# Patient Record
Sex: MALE | Race: WHITE | Employment: OTHER | URBAN - METROPOLITAN AREA
[De-identification: names, ages, dates, MRNs, and addresses within clinical notes are randomized per-mention and may not be internally consistent; named-entity substitution may affect disease eponyms.]

---

## 2022-03-04 ENCOUNTER — APPOINTMENT (OUTPATIENT)
Dept: CT IMAGING | Age: 52
End: 2022-03-04
Payer: MEDICARE

## 2022-03-04 ENCOUNTER — APPOINTMENT (OUTPATIENT)
Dept: GENERAL RADIOLOGY | Age: 52
End: 2022-03-04
Payer: MEDICARE

## 2022-03-04 ENCOUNTER — HOSPITAL ENCOUNTER (OUTPATIENT)
Age: 52
Setting detail: OBSERVATION
Discharge: OTHER INSTITUTION | End: 2022-03-07
Attending: EMERGENCY MEDICINE | Admitting: INTERNAL MEDICINE
Payer: MEDICARE

## 2022-03-04 DIAGNOSIS — M54.12 CERVICAL RADICULOPATHY: ICD-10-CM

## 2022-03-04 DIAGNOSIS — R07.9 CHEST PAIN, UNSPECIFIED TYPE: Primary | ICD-10-CM

## 2022-03-04 DIAGNOSIS — G90.512 COMPLEX REGIONAL PAIN SYNDROME TYPE 1 OF LEFT UPPER EXTREMITY: ICD-10-CM

## 2022-03-04 DIAGNOSIS — M19.042 DEGENERATIVE ARTHRITIS OF METACARPOPHALANGEAL JOINT OF LEFT THUMB: ICD-10-CM

## 2022-03-04 PROBLEM — R07.89 ATYPICAL CHEST PAIN: Status: ACTIVE | Noted: 2022-03-04

## 2022-03-04 LAB
ALBUMIN SERPL-MCNC: 4.6 G/DL (ref 3.5–5.2)
ALP BLD-CCNC: 107 U/L (ref 40–129)
ALT SERPL-CCNC: 38 U/L (ref 0–40)
ANION GAP SERPL CALCULATED.3IONS-SCNC: 12 MMOL/L (ref 7–16)
AST SERPL-CCNC: 21 U/L (ref 0–39)
BACTERIA: NORMAL /HPF
BASOPHILS ABSOLUTE: 0.08 E9/L (ref 0–0.2)
BASOPHILS RELATIVE PERCENT: 1.2 % (ref 0–2)
BILIRUB SERPL-MCNC: 0.4 MG/DL (ref 0–1.2)
BILIRUBIN URINE: NEGATIVE
BLOOD, URINE: NEGATIVE
BUN BLDV-MCNC: 17 MG/DL (ref 6–20)
CALCIUM SERPL-MCNC: 9.5 MG/DL (ref 8.6–10.2)
CHLORIDE BLD-SCNC: 101 MMOL/L (ref 98–107)
CLARITY: CLEAR
CO2: 23 MMOL/L (ref 22–29)
COLOR: YELLOW
CREAT SERPL-MCNC: 0.8 MG/DL (ref 0.7–1.2)
EOSINOPHILS ABSOLUTE: 0.4 E9/L (ref 0.05–0.5)
EOSINOPHILS RELATIVE PERCENT: 5.8 % (ref 0–6)
GFR AFRICAN AMERICAN: >60
GFR NON-AFRICAN AMERICAN: >60 ML/MIN/1.73
GLUCOSE BLD-MCNC: 158 MG/DL (ref 74–99)
GLUCOSE URINE: NEGATIVE MG/DL
HCT VFR BLD CALC: 47.1 % (ref 37–54)
HEMOGLOBIN: 16.5 G/DL (ref 12.5–16.5)
IMMATURE GRANULOCYTES #: 0.01 E9/L
IMMATURE GRANULOCYTES %: 0.1 % (ref 0–5)
KETONES, URINE: NEGATIVE MG/DL
LACTIC ACID: 1.4 MMOL/L (ref 0.5–2.2)
LEUKOCYTE ESTERASE, URINE: NEGATIVE
LYMPHOCYTES ABSOLUTE: 2.7 E9/L (ref 1.5–4)
LYMPHOCYTES RELATIVE PERCENT: 39.4 % (ref 20–42)
MCH RBC QN AUTO: 31.5 PG (ref 26–35)
MCHC RBC AUTO-ENTMCNC: 35 % (ref 32–34.5)
MCV RBC AUTO: 89.9 FL (ref 80–99.9)
MONOCYTES ABSOLUTE: 0.74 E9/L (ref 0.1–0.95)
MONOCYTES RELATIVE PERCENT: 10.8 % (ref 2–12)
NEUTROPHILS ABSOLUTE: 2.92 E9/L (ref 1.8–7.3)
NEUTROPHILS RELATIVE PERCENT: 42.7 % (ref 43–80)
NITRITE, URINE: NEGATIVE
PDW BLD-RTO: 12.4 FL (ref 11.5–15)
PH UA: 7 (ref 5–9)
PLATELET # BLD: 241 E9/L (ref 130–450)
PMV BLD AUTO: 10 FL (ref 7–12)
POTASSIUM REFLEX MAGNESIUM: 4.1 MMOL/L (ref 3.5–5)
PRO-BNP: 8 PG/ML (ref 0–125)
PROTEIN UA: NEGATIVE MG/DL
RBC # BLD: 5.24 E12/L (ref 3.8–5.8)
RBC UA: NORMAL /HPF (ref 0–2)
SODIUM BLD-SCNC: 136 MMOL/L (ref 132–146)
SPECIFIC GRAVITY UA: 1.01 (ref 1–1.03)
TOTAL PROTEIN: 7.8 G/DL (ref 6.4–8.3)
TROPONIN, HIGH SENSITIVITY: 6 NG/L (ref 0–11)
TROPONIN, HIGH SENSITIVITY: 6 NG/L (ref 0–11)
UROBILINOGEN, URINE: 0.2 E.U./DL
WBC # BLD: 6.9 E9/L (ref 4.5–11.5)
WBC UA: NORMAL /HPF (ref 0–5)

## 2022-03-04 PROCEDURE — 6370000000 HC RX 637 (ALT 250 FOR IP): Performed by: STUDENT IN AN ORGANIZED HEALTH CARE EDUCATION/TRAINING PROGRAM

## 2022-03-04 PROCEDURE — 99285 EMERGENCY DEPT VISIT HI MDM: CPT

## 2022-03-04 PROCEDURE — 6370000000 HC RX 637 (ALT 250 FOR IP): Performed by: INTERNAL MEDICINE

## 2022-03-04 PROCEDURE — 80053 COMPREHEN METABOLIC PANEL: CPT

## 2022-03-04 PROCEDURE — 81001 URINALYSIS AUTO W/SCOPE: CPT

## 2022-03-04 PROCEDURE — 36415 COLL VENOUS BLD VENIPUNCTURE: CPT

## 2022-03-04 PROCEDURE — 71045 X-RAY EXAM CHEST 1 VIEW: CPT

## 2022-03-04 PROCEDURE — 83880 ASSAY OF NATRIURETIC PEPTIDE: CPT

## 2022-03-04 PROCEDURE — 70496 CT ANGIOGRAPHY HEAD: CPT

## 2022-03-04 PROCEDURE — 80307 DRUG TEST PRSMV CHEM ANLYZR: CPT

## 2022-03-04 PROCEDURE — 96361 HYDRATE IV INFUSION ADD-ON: CPT

## 2022-03-04 PROCEDURE — 70498 CT ANGIOGRAPHY NECK: CPT

## 2022-03-04 PROCEDURE — 99220 PR INITIAL OBSERVATION CARE/DAY 70 MINUTES: CPT | Performed by: INTERNAL MEDICINE

## 2022-03-04 PROCEDURE — 6360000004 HC RX CONTRAST MEDICATION: Performed by: RADIOLOGY

## 2022-03-04 PROCEDURE — 2580000003 HC RX 258: Performed by: STUDENT IN AN ORGANIZED HEALTH CARE EDUCATION/TRAINING PROGRAM

## 2022-03-04 PROCEDURE — 83605 ASSAY OF LACTIC ACID: CPT

## 2022-03-04 PROCEDURE — 84484 ASSAY OF TROPONIN QUANT: CPT

## 2022-03-04 PROCEDURE — 85025 COMPLETE CBC W/AUTO DIFF WBC: CPT

## 2022-03-04 PROCEDURE — 93005 ELECTROCARDIOGRAM TRACING: CPT | Performed by: STUDENT IN AN ORGANIZED HEALTH CARE EDUCATION/TRAINING PROGRAM

## 2022-03-04 RX ORDER — 0.9 % SODIUM CHLORIDE 0.9 %
1000 INTRAVENOUS SOLUTION INTRAVENOUS ONCE
Status: COMPLETED | OUTPATIENT
Start: 2022-03-04 | End: 2022-03-04

## 2022-03-04 RX ORDER — ACETAMINOPHEN 325 MG/1
650 TABLET ORAL EVERY 6 HOURS PRN
Status: DISCONTINUED | OUTPATIENT
Start: 2022-03-04 | End: 2022-03-07 | Stop reason: HOSPADM

## 2022-03-04 RX ORDER — ASPIRIN 81 MG/1
324 TABLET, CHEWABLE ORAL ONCE
Status: COMPLETED | OUTPATIENT
Start: 2022-03-04 | End: 2022-03-04

## 2022-03-04 RX ORDER — ONDANSETRON 4 MG/1
4 TABLET, ORALLY DISINTEGRATING ORAL EVERY 8 HOURS PRN
Status: DISCONTINUED | OUTPATIENT
Start: 2022-03-04 | End: 2022-03-07 | Stop reason: HOSPADM

## 2022-03-04 RX ORDER — ACETAMINOPHEN 325 MG/1
650 TABLET ORAL ONCE
Status: COMPLETED | OUTPATIENT
Start: 2022-03-04 | End: 2022-03-04

## 2022-03-04 RX ORDER — ASPIRIN 81 MG/1
81 TABLET, CHEWABLE ORAL DAILY
Status: DISCONTINUED | OUTPATIENT
Start: 2022-03-05 | End: 2022-03-07 | Stop reason: HOSPADM

## 2022-03-04 RX ORDER — ONDANSETRON 2 MG/ML
4 INJECTION INTRAMUSCULAR; INTRAVENOUS EVERY 6 HOURS PRN
Status: DISCONTINUED | OUTPATIENT
Start: 2022-03-04 | End: 2022-03-07 | Stop reason: HOSPADM

## 2022-03-04 RX ORDER — ATORVASTATIN CALCIUM 40 MG/1
40 TABLET, FILM COATED ORAL NIGHTLY
Status: DISCONTINUED | OUTPATIENT
Start: 2022-03-05 | End: 2022-03-07 | Stop reason: HOSPADM

## 2022-03-04 RX ORDER — AMLODIPINE BESYLATE 10 MG/1
10 TABLET ORAL DAILY
Status: ON HOLD | COMMUNITY
End: 2022-03-07 | Stop reason: SDUPTHER

## 2022-03-04 RX ORDER — ALBUTEROL SULFATE 90 UG/1
2 AEROSOL, METERED RESPIRATORY (INHALATION) EVERY 4 HOURS PRN
COMMUNITY

## 2022-03-04 RX ORDER — SODIUM CHLORIDE 0.9 % (FLUSH) 0.9 %
5-40 SYRINGE (ML) INJECTION EVERY 12 HOURS SCHEDULED
Status: DISCONTINUED | OUTPATIENT
Start: 2022-03-05 | End: 2022-03-07 | Stop reason: HOSPADM

## 2022-03-04 RX ORDER — SODIUM CHLORIDE 0.9 % (FLUSH) 0.9 %
5-40 SYRINGE (ML) INJECTION PRN
Status: DISCONTINUED | OUTPATIENT
Start: 2022-03-04 | End: 2022-03-07 | Stop reason: HOSPADM

## 2022-03-04 RX ORDER — POLYETHYLENE GLYCOL 3350 17 G/17G
17 POWDER, FOR SOLUTION ORAL DAILY PRN
Status: DISCONTINUED | OUTPATIENT
Start: 2022-03-04 | End: 2022-03-07 | Stop reason: HOSPADM

## 2022-03-04 RX ORDER — SODIUM CHLORIDE 9 MG/ML
25 INJECTION, SOLUTION INTRAVENOUS PRN
Status: DISCONTINUED | OUTPATIENT
Start: 2022-03-04 | End: 2022-03-07 | Stop reason: HOSPADM

## 2022-03-04 RX ORDER — ACETAMINOPHEN 650 MG/1
650 SUPPOSITORY RECTAL EVERY 6 HOURS PRN
Status: DISCONTINUED | OUTPATIENT
Start: 2022-03-04 | End: 2022-03-07 | Stop reason: HOSPADM

## 2022-03-04 RX ORDER — NAPROXEN 500 MG/1
500 TABLET ORAL
Status: ON HOLD | COMMUNITY
End: 2022-03-07 | Stop reason: HOSPADM

## 2022-03-04 RX ORDER — AMLODIPINE BESYLATE 5 MG/1
10 TABLET ORAL DAILY
Status: DISCONTINUED | OUTPATIENT
Start: 2022-03-05 | End: 2022-03-07 | Stop reason: HOSPADM

## 2022-03-04 RX ADMIN — SODIUM CHLORIDE 1000 ML: 9 INJECTION, SOLUTION INTRAVENOUS at 19:34

## 2022-03-04 RX ADMIN — IOPAMIDOL 75 ML: 755 INJECTION, SOLUTION INTRAVENOUS at 20:26

## 2022-03-04 RX ADMIN — ASPIRIN 81 MG CHEWABLE TABLET 324 MG: 81 TABLET CHEWABLE at 21:49

## 2022-03-04 RX ADMIN — ACETAMINOPHEN 650 MG: 325 TABLET ORAL at 19:34

## 2022-03-04 RX ADMIN — ATORVASTATIN CALCIUM 40 MG: 40 TABLET, FILM COATED ORAL at 23:59

## 2022-03-04 ASSESSMENT — PAIN SCALES - GENERAL
PAINLEVEL_OUTOF10: 6
PAINLEVEL_OUTOF10: 8

## 2022-03-04 ASSESSMENT — PAIN DESCRIPTION - ORIENTATION: ORIENTATION: POSTERIOR

## 2022-03-04 ASSESSMENT — PAIN DESCRIPTION - DIRECTION: RADIATING_TOWARDS: BEHIND EYES

## 2022-03-04 ASSESSMENT — PAIN DESCRIPTION - PAIN TYPE: TYPE: ACUTE PAIN

## 2022-03-04 ASSESSMENT — PAIN DESCRIPTION - LOCATION: LOCATION: HEAD;NECK

## 2022-03-04 ASSESSMENT — PAIN - FUNCTIONAL ASSESSMENT: PAIN_FUNCTIONAL_ASSESSMENT: 0-10

## 2022-03-05 ENCOUNTER — APPOINTMENT (OUTPATIENT)
Dept: NUCLEAR MEDICINE | Age: 52
End: 2022-03-05
Payer: MEDICARE

## 2022-03-05 PROBLEM — R20.0 LEFT SIDED NUMBNESS: Status: ACTIVE | Noted: 2022-03-05

## 2022-03-05 PROBLEM — I63.9 STROKE (CEREBRUM) (HCC): Status: ACTIVE | Noted: 2022-03-05

## 2022-03-05 PROBLEM — G90.512 COMPLEX REGIONAL PAIN SYNDROME TYPE 1 OF LEFT UPPER EXTREMITY: Status: ACTIVE | Noted: 2022-03-05

## 2022-03-05 PROBLEM — M54.12 CERVICAL RADICULOPATHY: Status: ACTIVE | Noted: 2022-03-05

## 2022-03-05 PROBLEM — F17.200 TOBACCO USE DISORDER: Status: ACTIVE | Noted: 2022-01-11

## 2022-03-05 PROBLEM — F31.9 BIPOLAR DISORDER (HCC): Status: ACTIVE | Noted: 2022-03-05

## 2022-03-05 PROBLEM — F43.10 PTSD (POST-TRAUMATIC STRESS DISORDER): Status: ACTIVE | Noted: 2022-03-05

## 2022-03-05 PROBLEM — M19.042 DEGENERATIVE ARTHRITIS OF METACARPOPHALANGEAL JOINT OF LEFT THUMB: Status: ACTIVE | Noted: 2022-03-05

## 2022-03-05 PROBLEM — M25.512 ACUTE PAIN OF LEFT SHOULDER: Status: ACTIVE | Noted: 2022-03-05

## 2022-03-05 LAB
AMPHETAMINE SCREEN, URINE: NOT DETECTED
ANION GAP SERPL CALCULATED.3IONS-SCNC: 10 MMOL/L (ref 7–16)
BARBITURATE SCREEN URINE: NOT DETECTED
BENZODIAZEPINE SCREEN, URINE: NOT DETECTED
BUN BLDV-MCNC: 15 MG/DL (ref 6–20)
C-REACTIVE PROTEIN: 0.3 MG/DL (ref 0–0.4)
CALCIUM SERPL-MCNC: 9.1 MG/DL (ref 8.6–10.2)
CANNABINOID SCREEN URINE: NOT DETECTED
CHLORIDE BLD-SCNC: 105 MMOL/L (ref 98–107)
CHOLESTEROL, TOTAL: 170 MG/DL (ref 0–199)
CO2: 23 MMOL/L (ref 22–29)
COCAINE METABOLITE SCREEN URINE: NOT DETECTED
CREAT SERPL-MCNC: 0.8 MG/DL (ref 0.7–1.2)
EKG ATRIAL RATE: 66 BPM
EKG ATRIAL RATE: 81 BPM
EKG P AXIS: 42 DEGREES
EKG P AXIS: 61 DEGREES
EKG P-R INTERVAL: 148 MS
EKG P-R INTERVAL: 156 MS
EKG Q-T INTERVAL: 378 MS
EKG Q-T INTERVAL: 410 MS
EKG QRS DURATION: 84 MS
EKG QRS DURATION: 88 MS
EKG QTC CALCULATION (BAZETT): 429 MS
EKG QTC CALCULATION (BAZETT): 439 MS
EKG R AXIS: 20 DEGREES
EKG R AXIS: 62 DEGREES
EKG T AXIS: 19 DEGREES
EKG T AXIS: 38 DEGREES
EKG VENTRICULAR RATE: 66 BPM
EKG VENTRICULAR RATE: 81 BPM
FENTANYL SCREEN, URINE: NOT DETECTED
GFR AFRICAN AMERICAN: >60
GFR NON-AFRICAN AMERICAN: >60 ML/MIN/1.73
GLUCOSE BLD-MCNC: 88 MG/DL (ref 74–99)
HCT VFR BLD CALC: 44.6 % (ref 37–54)
HDLC SERPL-MCNC: 32 MG/DL
HEMOGLOBIN: 15.6 G/DL (ref 12.5–16.5)
LDL CHOLESTEROL CALCULATED: 122 MG/DL (ref 0–99)
Lab: NORMAL
MCH RBC QN AUTO: 31.9 PG (ref 26–35)
MCHC RBC AUTO-ENTMCNC: 35 % (ref 32–34.5)
MCV RBC AUTO: 91.2 FL (ref 80–99.9)
METHADONE SCREEN, URINE: NOT DETECTED
OPIATE SCREEN URINE: NOT DETECTED
OXYCODONE URINE: NOT DETECTED
PDW BLD-RTO: 12.6 FL (ref 11.5–15)
PHENCYCLIDINE SCREEN URINE: NOT DETECTED
PLATELET # BLD: 225 E9/L (ref 130–450)
PMV BLD AUTO: 10.3 FL (ref 7–12)
POTASSIUM SERPL-SCNC: 4.1 MMOL/L (ref 3.5–5)
RBC # BLD: 4.89 E12/L (ref 3.8–5.8)
RHEUMATOID FACTOR: 11 IU/ML (ref 0–13)
SEDIMENTATION RATE, ERYTHROCYTE: 4 MM/HR (ref 0–15)
SODIUM BLD-SCNC: 138 MMOL/L (ref 132–146)
TRIGL SERPL-MCNC: 81 MG/DL (ref 0–149)
VLDLC SERPL CALC-MCNC: 16 MG/DL
WBC # BLD: 6.9 E9/L (ref 4.5–11.5)

## 2022-03-05 PROCEDURE — 85651 RBC SED RATE NONAUTOMATED: CPT

## 2022-03-05 PROCEDURE — 85027 COMPLETE CBC AUTOMATED: CPT

## 2022-03-05 PROCEDURE — 6370000000 HC RX 637 (ALT 250 FOR IP): Performed by: INTERNAL MEDICINE

## 2022-03-05 PROCEDURE — 86038 ANTINUCLEAR ANTIBODIES: CPT

## 2022-03-05 PROCEDURE — G0378 HOSPITAL OBSERVATION PER HR: HCPCS

## 2022-03-05 PROCEDURE — 86592 SYPHILIS TEST NON-TREP QUAL: CPT

## 2022-03-05 PROCEDURE — 6360000002 HC RX W HCPCS: Performed by: INTERNAL MEDICINE

## 2022-03-05 PROCEDURE — 99226 PR SBSQ OBSERVATION CARE/DAY 35 MINUTES: CPT | Performed by: FAMILY MEDICINE

## 2022-03-05 PROCEDURE — 87491 CHLMYD TRACH DNA AMP PROBE: CPT

## 2022-03-05 PROCEDURE — 86431 RHEUMATOID FACTOR QUANT: CPT

## 2022-03-05 PROCEDURE — 86140 C-REACTIVE PROTEIN: CPT

## 2022-03-05 PROCEDURE — 96372 THER/PROPH/DIAG INJ SC/IM: CPT

## 2022-03-05 PROCEDURE — 96375 TX/PRO/DX INJ NEW DRUG ADDON: CPT

## 2022-03-05 PROCEDURE — A9500 TC99M SESTAMIBI: HCPCS | Performed by: RADIOLOGY

## 2022-03-05 PROCEDURE — 36415 COLL VENOUS BLD VENIPUNCTURE: CPT

## 2022-03-05 PROCEDURE — 99204 OFFICE O/P NEW MOD 45 MIN: CPT | Performed by: INTERNAL MEDICINE

## 2022-03-05 PROCEDURE — 80061 LIPID PANEL: CPT

## 2022-03-05 PROCEDURE — 78452 HT MUSCLE IMAGE SPECT MULT: CPT

## 2022-03-05 PROCEDURE — 2580000003 HC RX 258: Performed by: INTERNAL MEDICINE

## 2022-03-05 PROCEDURE — 87591 N.GONORRHOEAE DNA AMP PROB: CPT

## 2022-03-05 PROCEDURE — 93005 ELECTROCARDIOGRAM TRACING: CPT | Performed by: INTERNAL MEDICINE

## 2022-03-05 PROCEDURE — 80048 BASIC METABOLIC PNL TOTAL CA: CPT

## 2022-03-05 PROCEDURE — 3430000000 HC RX DIAGNOSTIC RADIOPHARMACEUTICAL: Performed by: RADIOLOGY

## 2022-03-05 RX ORDER — KETOROLAC TROMETHAMINE 30 MG/ML
30 INJECTION, SOLUTION INTRAMUSCULAR; INTRAVENOUS EVERY 8 HOURS PRN
Status: DISCONTINUED | OUTPATIENT
Start: 2022-03-05 | End: 2022-03-07

## 2022-03-05 RX ORDER — OXYCODONE HYDROCHLORIDE AND ACETAMINOPHEN 5; 325 MG/1; MG/1
1 TABLET ORAL EVERY 4 HOURS PRN
Status: DISCONTINUED | OUTPATIENT
Start: 2022-03-05 | End: 2022-03-07 | Stop reason: HOSPADM

## 2022-03-05 RX ORDER — ISOSORBIDE MONONITRATE 30 MG/1
30 TABLET, EXTENDED RELEASE ORAL DAILY
Status: DISCONTINUED | OUTPATIENT
Start: 2022-03-05 | End: 2022-03-07 | Stop reason: HOSPADM

## 2022-03-05 RX ADMIN — Medication 20 MILLICURIE: at 13:23

## 2022-03-05 RX ADMIN — ONDANSETRON 4 MG: 4 TABLET, ORALLY DISINTEGRATING ORAL at 14:30

## 2022-03-05 RX ADMIN — SODIUM CHLORIDE, PRESERVATIVE FREE 10 ML: 5 INJECTION INTRAVENOUS at 09:00

## 2022-03-05 RX ADMIN — ASPIRIN 81 MG 81 MG: 81 TABLET ORAL at 10:09

## 2022-03-05 RX ADMIN — OXYCODONE AND ACETAMINOPHEN 1 TABLET: 5; 325 TABLET ORAL at 01:53

## 2022-03-05 RX ADMIN — SODIUM CHLORIDE, PRESERVATIVE FREE 10 ML: 5 INJECTION INTRAVENOUS at 20:37

## 2022-03-05 RX ADMIN — AMLODIPINE BESYLATE 10 MG: 5 TABLET ORAL at 10:09

## 2022-03-05 RX ADMIN — OXYCODONE AND ACETAMINOPHEN 1 TABLET: 5; 325 TABLET ORAL at 06:06

## 2022-03-05 RX ADMIN — ATORVASTATIN CALCIUM 40 MG: 40 TABLET, FILM COATED ORAL at 20:37

## 2022-03-05 RX ADMIN — OXYCODONE AND ACETAMINOPHEN 1 TABLET: 5; 325 TABLET ORAL at 10:10

## 2022-03-05 RX ADMIN — ONDANSETRON 4 MG: 2 INJECTION INTRAMUSCULAR; INTRAVENOUS at 03:01

## 2022-03-05 RX ADMIN — ISOSORBIDE MONONITRATE 30 MG: 30 TABLET, EXTENDED RELEASE ORAL at 14:01

## 2022-03-05 RX ADMIN — OXYCODONE AND ACETAMINOPHEN 1 TABLET: 5; 325 TABLET ORAL at 14:28

## 2022-03-05 RX ADMIN — ENOXAPARIN SODIUM 40 MG: 100 INJECTION SUBCUTANEOUS at 09:00

## 2022-03-05 RX ADMIN — OXYCODONE AND ACETAMINOPHEN 1 TABLET: 5; 325 TABLET ORAL at 20:37

## 2022-03-05 ASSESSMENT — ENCOUNTER SYMPTOMS
SHORTNESS OF BREATH: 0
DIARRHEA: 0
NAUSEA: 0
ABDOMINAL PAIN: 0
VOMITING: 0
COUGH: 0
BACK PAIN: 1

## 2022-03-05 ASSESSMENT — PAIN SCALES - GENERAL
PAINLEVEL_OUTOF10: 8
PAINLEVEL_OUTOF10: 3
PAINLEVEL_OUTOF10: 2
PAINLEVEL_OUTOF10: 7
PAINLEVEL_OUTOF10: 6
PAINLEVEL_OUTOF10: 8
PAINLEVEL_OUTOF10: 7
PAINLEVEL_OUTOF10: 8
PAINLEVEL_OUTOF10: 7
PAINLEVEL_OUTOF10: 8

## 2022-03-05 ASSESSMENT — PAIN DESCRIPTION - LOCATION: LOCATION: HEAD

## 2022-03-05 ASSESSMENT — PAIN DESCRIPTION - PAIN TYPE: TYPE: ACUTE PAIN

## 2022-03-05 ASSESSMENT — PAIN DESCRIPTION - DIRECTION: RADIATING_TOWARDS: BEHIND EYES

## 2022-03-05 ASSESSMENT — PAIN DESCRIPTION - ORIENTATION: ORIENTATION: POSTERIOR

## 2022-03-05 NOTE — CONSULTS
History Of Present Illness: CHIEF COMPLAINT:  Chest pain and left-sided numbness.     History of Present Illness:  46 y.o. male with a history of hypertension, remote TIA, asthma, cervical radiculopathy, CRPS of the left upper extremity, bipolar disorder/PTSD and chronic hep C who presents with above complaints. Patient currently incarcerated at [facility] since February 15th. He is not there for substance abuse but states because of behavioral reasons. This program offers work release but patient is on disability from [address] in [address] [facility]. He had suffered lacerated liver and multiple fractures. He states he was DOA he was resuscitated. Patient has been having issues with chest pain on and off for weeks. He had tele-health visit with his provider and they felt he may be suffering from costochondritis. He was prescribed naproxen 500 mg tid prn but had not notice and improvement. He became concerned today as it felt like someone was squeezing his heart. It was hard for him to breath. Pain in chest he described as stabbing. He was concerned as his younger brother age 52 had MI in Oct of this past year. Patient himself had TIA's in past and noticed increased numbness in left side specifically LUE which was newer for him today. He also has had ongoing neck pain and over the last few days it has been difficult for him to move his head from side to side and especially hurts in the back of his neck. In ER, he was given aspirin 324 mg. EKG was obtained and this did not reveal any ischemic changes. High sensitivity troponin x 2 were wnl. They calculate his heart score to be 4 and felt he warranted observation to undergo stress test tomorrow. He has not had one previously. In regards to left sided paresthesias, he had CTA head and neck which did not reveal any acute abnormalities. ER felt we could pursue MRI tomorrow if we felt that was warranted. As above per Dr Gary Metz.   Patient interviewed and reports several days of left sided numbness, persisting during interview, involving left arm and leg. He reports chronic neck pain. The patient is a 46 y.o. male with significant past medical history of see below who presents with above. The patient has the following symptoms:    Change in level of consciousness: alert    New Weakness: secondary to pain    Numbness or Tingling: yes    Difficulty Swallowing: no    Current Medications:   Scheduled Meds:   sodium chloride flush  5-40 mL IntraVENous 2 times per day    aspirin  81 mg Oral Daily    atorvastatin  40 mg Oral Nightly    enoxaparin  40 mg SubCUTAneous Daily    amLODIPine  10 mg Oral Daily     Continuous Infusions:   sodium chloride       PRN Meds:oxyCODONE-acetaminophen, sodium chloride flush, sodium chloride, ondansetron **OR** ondansetron, acetaminophen **OR** acetaminophen, polyethylene glycol    Allergies:  Patient has no known allergies. Social History:   TOBACCO:   reports that he quit smoking about 2 months ago. He has never used smokeless tobacco.  ETOH:   reports no history of alcohol use. Past Medical History:        Diagnosis Date    Arthritis     bilateral thumbs    Asthma     Carpal tunnel syndrome     Cervical radiculopathy     Depression     Headache     Hepatitis C     Hypertension     Inguinal hernia     TIA (transient ischemic attack)        Past Surgical History:        Procedure Laterality Date    CARPAL TUNNEL RELEASE      FACIAL SURGERY      FINGER AMPUTATION      multiple digits to right hand    HERNIA REPAIR      TONSILLECTOMY           Outside reports reviewed: ER records, historical medical records, lab reports and radiology reports. Patient's medications, allergies, past medical, surgical, social and family histories were reviewed and updated as appropriate.     Review of Systems  A comprehensive review of systems was negative except for:       Objective:     Neuro exam 150/88 (176/96) p 86 t 98  General: normal orientation and alertness. Cranial nerve testing was normal.  Funduscopic eye exam revealed not testable. Motor exam: 5-/5 except diminished left shoulder movement secondary to pain. Deep tendon reflexes were 1+ bilaterally. Plantar responses were flexor bilaterally. Cerebellar exam noted finger to nose without dysmetria. Sensation was decreased in the lower extremities. Assessment:   Stroke by clinical criteria with left sided numbness persisting greater than 24 hours. Long standing cervicalgia  Head ct/CTA of head and neck negative      Plan:   Continue to address vascular risk factors especially bp control.   Agree with statin, aspirin  Suggest outpatient work up of chronic neck pain (and follow up of stroke) with mri imaging--[facility] neurology; dr Tigist Sprague; additionally could order mri of neck now but this is not urgent issue (MRI of brain unlikely to change therapy)  Thanks for consult

## 2022-03-05 NOTE — ED PROVIDER NOTES
42-year-old male with reported history of TIAs presenting for chest pain, left-sided numbness. Patient states that he has been having left-sided numbness for 2 days as well as headache and chest pain. He states he also was seeing spots in his vision. Symptoms have been persistent and worsening, no exacerbating or relieving factors. Patient complains of a stabbing chest pain that has been getting worse. It is worsened with breathing. He endorses decreased appetite and lower bilateral back pain. He denies any fever, nausea, emesis, or diarrhea. He does not think he is ever had a stress test.  He does endorse recent trauma to his head by hitting it off a bunk bed yesterday morning. Review of Systems   Constitutional: Negative for chills and fever. HENT: Negative for congestion. Eyes: Positive for visual disturbance. Respiratory: Negative for cough and shortness of breath. Cardiovascular: Positive for chest pain. Gastrointestinal: Negative for abdominal pain, diarrhea, nausea and vomiting. Genitourinary: Negative for dysuria and flank pain. Musculoskeletal: Positive for back pain and neck pain. Skin: Negative for rash and wound. Neurological: Positive for numbness and headaches. Negative for weakness. All other systems reviewed and are negative. Physical Exam  Constitutional:       General: He is not in acute distress. Appearance: Normal appearance. He is not ill-appearing. HENT:      Head: Normocephalic and atraumatic. Right Ear: External ear normal.      Left Ear: External ear normal.      Nose: Nose normal.   Eyes:      Extraocular Movements: Extraocular movements intact. Conjunctiva/sclera: Conjunctivae normal.      Pupils: Pupils are equal, round, and reactive to light. Cardiovascular:      Rate and Rhythm: Normal rate and regular rhythm. Pulmonary:      Effort: Pulmonary effort is normal.      Breath sounds: Normal breath sounds.    Abdominal: General: There is no distension. Palpations: Abdomen is soft. Tenderness: There is no abdominal tenderness. Musculoskeletal:         General: Normal range of motion. Skin:     General: Skin is warm and dry. Neurological:      Mental Status: He is alert. Motor: No weakness. Comments: Decreased sensation to touch left upper arm, left shoulder, left neck, left side face; sensation intact left forearm, decrease sensation to touch left fingers; decreased sensation to touch left lower leg (chronic per patient)   Psychiatric:         Mood and Affect: Mood normal.         Behavior: Behavior normal.          Procedures     MDM  Number of Diagnoses or Management Options  Chest pain, unspecified type  Diagnosis management comments: 57-year-old male presenting with chest pain, neck pain, headache, left-sided numbness. EKG unremarkable. CTA head and neck unremarkable. Chest x-ray unremarkable. All labs unremarkable. Patient has not had a stress test and heart score is 4 indicating moderate risk. Therefore, decision made to admit patient for further evaluation of chest pain and neurological symptoms. Patient was admitted to telemetry in stable condition. Amount and/or Complexity of Data Reviewed  Clinical lab tests: reviewed  Tests in the radiology section of CPT®: reviewed                  --------------------------------------------- PAST HISTORY ---------------------------------------------  Past Medical History:  has a past medical history of Arthritis, Asthma, Carpal tunnel syndrome, Cervical radiculopathy, Depression, Headache, Hepatitis C, Hypertension, Inguinal hernia, and TIA (transient ischemic attack). Past Surgical History:  has a past surgical history that includes Facial Surgery; Carpal tunnel release; Finger amputation; Tonsillectomy; and hernia repair. Social History:  reports that he has been smoking.  He has never used smokeless tobacco. He reports that he does not drink alcohol and does not use drugs. Family History: family history includes Cancer in his brother; Diabetes in his brother; Heart Attack in his father; Heart Disease in his brother and father. The patients home medications have been reviewed. Allergies: Patient has no known allergies.     -------------------------------------------------- RESULTS -------------------------------------------------    LABS:  Results for orders placed or performed during the hospital encounter of 03/04/22   CBC with Auto Differential   Result Value Ref Range    WBC 6.9 4.5 - 11.5 E9/L    RBC 5.24 3.80 - 5.80 E12/L    Hemoglobin 16.5 12.5 - 16.5 g/dL    Hematocrit 47.1 37.0 - 54.0 %    MCV 89.9 80.0 - 99.9 fL    MCH 31.5 26.0 - 35.0 pg    MCHC 35.0 (H) 32.0 - 34.5 %    RDW 12.4 11.5 - 15.0 fL    Platelets 882 509 - 727 E9/L    MPV 10.0 7.0 - 12.0 fL    Neutrophils % 42.7 (L) 43.0 - 80.0 %    Immature Granulocytes % 0.1 0.0 - 5.0 %    Lymphocytes % 39.4 20.0 - 42.0 %    Monocytes % 10.8 2.0 - 12.0 %    Eosinophils % 5.8 0.0 - 6.0 %    Basophils % 1.2 0.0 - 2.0 %    Neutrophils Absolute 2.92 1.80 - 7.30 E9/L    Immature Granulocytes # 0.01 E9/L    Lymphocytes Absolute 2.70 1.50 - 4.00 E9/L    Monocytes Absolute 0.74 0.10 - 0.95 E9/L    Eosinophils Absolute 0.40 0.05 - 0.50 E9/L    Basophils Absolute 0.08 0.00 - 0.20 E9/L   Comprehensive Metabolic Panel w/ Reflex to MG   Result Value Ref Range    Sodium 136 132 - 146 mmol/L    Potassium reflex Magnesium 4.1 3.5 - 5.0 mmol/L    Chloride 101 98 - 107 mmol/L    CO2 23 22 - 29 mmol/L    Anion Gap 12 7 - 16 mmol/L    Glucose 158 (H) 74 - 99 mg/dL    BUN 17 6 - 20 mg/dL    CREATININE 0.8 0.7 - 1.2 mg/dL    GFR Non-African American >60 >=60 mL/min/1.73    GFR African American >60     Calcium 9.5 8.6 - 10.2 mg/dL    Total Protein 7.8 6.4 - 8.3 g/dL    Albumin 4.6 3.5 - 5.2 g/dL    Total Bilirubin 0.4 0.0 - 1.2 mg/dL    Alkaline Phosphatase 107 40 - 129 U/L    ALT 38 0 - 40 U/L    AST 21 0 - 39 U/L   Troponin   Result Value Ref Range    Troponin, High Sensitivity 6 0 - 11 ng/L   Brain Natriuretic Peptide   Result Value Ref Range    Pro-BNP 8 0 - 125 pg/mL   Urinalysis with Microscopic   Result Value Ref Range    Color, UA Yellow Straw/Yellow    Clarity, UA Clear Clear    Glucose, Ur Negative Negative mg/dL    Bilirubin Urine Negative Negative    Ketones, Urine Negative Negative mg/dL    Specific Gravity, UA 1.010 1.005 - 1.030    Blood, Urine Negative Negative    pH, UA 7.0 5.0 - 9.0    Protein, UA Negative Negative mg/dL    Urobilinogen, Urine 0.2 <2.0 E.U./dL    Nitrite, Urine Negative Negative    Leukocyte Esterase, Urine Negative Negative    WBC, UA NONE 0 - 5 /HPF    RBC, UA NONE 0 - 2 /HPF    Bacteria, UA NONE SEEN None Seen /HPF   Lactic Acid   Result Value Ref Range    Lactic Acid 1.4 0.5 - 2.2 mmol/L   Troponin   Result Value Ref Range    Troponin, High Sensitivity 6 0 - 11 ng/L       RADIOLOGY:  CTA HEAD W CONTRAST   Final Result   1. No acute intracranial abnormality. No acute territorial infarction,   intracranial hemorrhage or mass lesion. 2. Unremarkable CTA of the head and neck. No hemodynamically significant   stenosis within head and neck vasculature. RECOMMENDATIONS:   Unavailable         CTA NECK W CONTRAST   Final Result   1. No acute intracranial abnormality. No acute territorial infarction,   intracranial hemorrhage or mass lesion. 2. Unremarkable CTA of the head and neck. No hemodynamically significant   stenosis within head and neck vasculature. RECOMMENDATIONS:   Unavailable         XR CHEST PORTABLE   Final Result   Atherosclerotic disease. No additional evidence of active cardiopulmonary   pathology. EKG:  This EKG is signed and interpreted by me.     Rate: 82  Rhythm: Sinus  Interpretation: no acute ST elevations or depressions, no acute T wave changes, normal intervals  Comparison: no previous EKG available      ------------------------- NURSING NOTES AND VITALS REVIEWED ---------------------------  Date / Time Roomed:  3/4/2022  6:58 PM  ED Bed Assignment:  01/01    The nursing notes within the ED encounter and vital signs as below have been reviewed. Patient Vitals for the past 24 hrs:   BP Temp Temp src Pulse Resp SpO2   03/04/22 2253 131/84 -- -- 82 18 94 %   03/04/22 2149 (!) 144/89 -- -- 85 18 96 %   03/04/22 2107 (!) 158/94 -- -- 89 20 95 %   03/04/22 1942 (!) 164/95 -- -- 79 20 96 %   03/04/22 1904 (!) 176/96 -- -- -- -- --   03/04/22 1853 -- 97.5 °F (36.4 °C) Temporal 87 20 98 %       Oxygen Saturation Interpretation: Normal    ------------------------------------------ PROGRESS NOTES ------------------------------------------    Counseling:  I have spoken with the patient and discussed todays results, in addition to providing specific details for the plan of care and counseling regarding the diagnosis and prognosis. Their questions are answered at this time and they are agreeable with the plan of admission.    --------------------------------- ADDITIONAL PROVIDER NOTES ---------------------------------    This patient's ED course included: a personal history and physicial examination, re-evaluation prior to disposition, multiple bedside re-evaluations, cardiac monitoring and continuous pulse oximetry    This patient has remained hemodynamically stable during their ED course. Diagnosis:  1. Chest pain, unspecified type        Disposition:  Patient's disposition: Admit to telemetry  Patient's condition is stable. Mary Love MD  Resident  03/05/22 0002      ATTENDING PROVIDER ATTESTATION:     I have personally performed and/or participated in the history, exam, medical decision making, and procedures and agree with all pertinent clinical information. I have also reviewed and agree with the past medical, family and social history unless otherwise noted.     I have discussed this patient in detail with the resident, and provided the instruction and education regarding chest pain, left sided numbness, acute coronary syndrome and cerebrovascular accident. My findings/Plan: Heart RRR. Lungs CTA bilaterally. Abdomen soft, nontender. Bowel sounds normal. Decreased sensation of left upper arm. Left lower arm sensation intact. Supportive care. Admit for further evaluation and management.          [address] [facility],   04/04/22 3063

## 2022-03-05 NOTE — ED NOTES
Pts medication list updated at this time per paperwork from his tele health appointment with his PCP at [facility] on 2/25/2022 -- according to paperwork from his provider, patient has been having neck pain that feels like a popping in his neck daily, as well as the numbness and tingling in his left arm. Pt also endorsed chest pain that was worse when lying down and episodes of palpitations 3-4 times a week.       Missael Peterson RN  03/04/22 1955

## 2022-03-05 NOTE — PROGRESS NOTES
[facility] Progress Note    Admitting Date and Time: 3/4/2022  6:58 PM  Admit Dx: Chest pain [R07.9]  Atypical chest pain [R07.89]  Chest pain, unspecified type [R07.9]    Subjective:  Patient is being followed for Chest pain [R07.9]  Atypical chest pain [R07.89]  Chest pain, unspecified type [R07.9]     Pt feels the same shoulder and neck pain -- severe left shoulder pain especially. Left neck pain -- same. Still with complete numbness down the left arm. Also c/o left arthritis at base of thumb. States chest pain is now gone -- \"squeezing\" discomfort of the left pec muscle now gone. No events since admission. No new tingling or any weakness or new numbness. No SOB. States Percocet 5 mg is not helping making his stomach upset, and refluxing. Per RN: nothing to report    ROS: denies fever, chills, cp, sob, n/v, HA unless stated above.       isosorbide mononitrate  30 mg Oral Daily    sodium chloride flush  5-40 mL IntraVENous 2 times per day    aspirin  81 mg Oral Daily    atorvastatin  40 mg Oral Nightly    enoxaparin  40 mg SubCUTAneous Daily    amLODIPine  10 mg Oral Daily     oxyCODONE-acetaminophen, 1 tablet, Q4H PRN  ketorolac, 30 mg, Q8H PRN  methocarbamol IVPB, 1,000 mg, Q8H PRN  sodium chloride flush, 5-40 mL, PRN  sodium chloride, 25 mL, PRN  ondansetron, 4 mg, Q8H PRN   Or  ondansetron, 4 mg, Q6H PRN  acetaminophen, 650 mg, Q6H PRN   Or  acetaminophen, 650 mg, Q6H PRN  polyethylene glycol, 17 g, Daily PRN         Objective:    /75   Pulse 82   Temp 98.4 °F (36.9 °C) (Oral)   Resp 18   Ht 5' 11\" (1.803 m)   Wt 196 lb (88.9 kg)   SpO2 92%   BMI 27.34 kg/m²   General Appearance: alert and oriented to person, place and time and in no acute distress  Skin: warm and dry, good turgor, no rashes, no jaundice  Head: normocephalic and atraumatic  Eyes: pupils equal, round, and reactive to light, extraocular eye movements intact, conjunctivae normal  Neck: neck supple and non tender without mass   Pulmonary/Chest: clear to auscultation bilaterally- no wheezes, rales or rhonchi, normal air movement, no respiratory distress  Cardiovascular: normal rate, normal S1 and S2 and no carotid bruits  Abdomen: soft, non-tender, non-distended, normal bowel sounds, no masses or organomegaly  Extremities: no cyanosis, no clubbing and no edema  Left shoulder -- bursa with fluid krystle c/w the right shoulder -- likely bursitis, very tender to touch, feels very warm c/w the right side  Neurologic: no cranial nerve deficit and speech normal, mental status normal, PERRL, EOMI, tongue/uvula midline, both  equal/normal, decreased left prox shoulder strength c/w the right (which is normal), mainly due to pain        Recent Labs     03/04/22 1920 03/05/22  0559    138   K 4.1 4.1    105   CO2 23 23   BUN 17 15   CREATININE 0.8 0.8   GLUCOSE 158* 88   CALCIUM 9.5 9.1       Recent Labs     03/04/22 1920 03/05/22  0559   WBC 6.9 6.9   RBC 5.24 4.89   HGB 16.5 15.6   HCT 47.1 44.6   MCV 89.9 91.2   MCH 31.5 31.9   MCHC 35.0* 35.0*   RDW 12.4 12.6    225   MPV 10.0 10.3       Radiology:   MRI pending    Nuc stress test -- pending    Assessment:    Principal Problem:    Stroke (cerebrum) ([facility])  Active Problems:    Atypical chest pain    Left sided numbness    Hepatitis C    Asthma    Hypertension    Complex regional pain syndrome type 1 of left upper extremity    Cervical radiculopathy    PTSD (post-traumatic stress disorder)    Tobacco use disorder    Bipolar disorder ([facility])    Acute pain of left shoulder    Degenerative arthritis of metacarpophalangeal joint of left thumb  Resolved Problems:    * No resolved hospital problems.  *      Plan:  1) Left sided chest pain -- now resolved, could be musculoskeletal with muscle spasm, possibly costo-chrondritis  -Robaxin prn  -Await nuc stress test -- second part to be done tomorrow  -Statin, aspirin for now    2) Left hand numbness/radicular pain left shoulder -- could be due to stroke (not likely), versus left neck radicular pathology versus left shoulder bursitis with inflammation of sheree-neural track  -Toradol prn  -Diclofenac gel to left shoulder  -Ice prn  -Statin, aspirin for now  -Likely will need outpatient MRI of neck  -Get left shoulder x-rays  -Consider ortho consultation  -LAURI, CRP, ESR, RF for now    3) Chronic hep C -- stable    4) Elevated BP -- likely uncontrolled HTN -- improved -- Norvasc 10 mg daily, Imdur 30 mg daily    5) Polysubstance abuse -- doing inpatient rehab now  -Discharge back to facility    6) Left MCP joint of thumb arthritis -- pt states recent development  -ESR/CRP/RF/LAURI for now  -consider ortho consult    DISP: back to rehab facility likely tomorrow -- if stress test is normal    DVT Prophylaxis: Lovenox 40 mg    NOTE: This report was transcribed using voice recognition software. Every effort was made to ensure accuracy; however, inadvertent computerized transcription errors may be present.     Electronically signed by Desiree Dennison DO on 3/5/2022 at 4:03 PM

## 2022-03-05 NOTE — ED NOTES
Pt states he started having symptoms of a headache, chest pain, left arm pain/numbness a couple of days ago. Pt states the headache is what started all of the symptoms and he was trying tylenol with little to no relief at home. Shortly thereafter his arm pain and numbness started. Pt has a past medical history of many TIAs and states his last one he believes was back in 2011. Pt states his family also has a long history of hypertension and Mis. Pt has some upper left arm numbness upon exam, but otherwise has a negative NIH exam at this time.      Pilar Suresh, ARABELLA  03/04/22 1946

## 2022-03-05 NOTE — CONSULTS
CHIEF COMPLAINT: Chest Pain    HISTORY OF PRESENT ILLNESS: Patient is a 46 y.o. male seen at the request of Helen Beal and followed at our office by    46 y.o. male with a history of hypertension, remote TIA, asthma, cervical radiculopathy, CRPS of the left upper extremity, bipolar disorder/PTSD and chronic hep C who presents with above complaints. Patient currently  at [facility] since [facility] 15th. He is not there for substance abuse but states because of behavioral reasons. This program offers work release but patient is on disability from [address] in [facility]. He had suffered lacerated liver and multiple fractures. He states he was DOA he was resuscitated. Patient has been having issues with chest pain on and off for weeks. He had tele-health visit with his provider and they felt he may be suffering from costochondritis. He was prescribed naproxen 500 mg tid prn but had not notice and improvement. He became concerned today as it felt like someone was squeezing his heart. It was hard for him to breathe. Pain in chest he described as stabbing. He was concerned as his younger brother age 52 had MI in Oct of this past year. Patient himself had TIA's in past and noticed increased numbness in left side specifically LUE which was newer for him today. He also has had ongoing neck pain and over the last few days it has been difficult for him to move his head from side to side and especially hurts in the back of his neck. In ER, he was given aspirin 324 mg. EKG was obtained and this did not reveal any ischemic changes. High sensitivity troponin x 2 were wnl. He does not have any prior significant cardiac history or work-up.   I was consulted for further recommendations    Currently, he denies any pain at rest.    Past Medical History:   Diagnosis Date    Arthritis     bilateral thumbs    Asthma     Carpal tunnel syndrome     Cervical radiculopathy     Depression     Headache     Hepatitis C  Hypertension     Inguinal hernia     TIA (transient ischemic attack)        Patient Active Problem List   Diagnosis    Chest pain    Atypical chest pain    Left sided numbness    Hepatitis C    Asthma    Hypertension    Complex regional pain syndrome type 1 of left upper extremity    Cervical radiculopathy    PTSD (post-traumatic stress disorder)    Tobacco use disorder    Bipolar disorder (HCC)       No Known Allergies    Current Facility-Administered Medications   Medication Dose Route Frequency Provider Last Rate Last Admin    oxyCODONE-acetaminophen (PERCOCET) 5-325 MG per tablet 1 tablet  1 tablet Oral Q4H PRN Meli Reddy MD   1 tablet at 03/05/22 0606    sodium chloride flush 0.9 % injection 5-40 mL  5-40 mL IntraVENous 2 times per day Meli Reddy MD        sodium chloride flush 0.9 % injection 5-40 mL  5-40 mL IntraVENous PRN Meli Reddy MD        0.9 % sodium chloride infusion  25 mL IntraVENous PRN Meli Reddy MD        ondansetron (ZOFRAN-ODT) disintegrating tablet 4 mg  4 mg Oral Q8H PRN Meli Reddy MD        Or    ondansetron [facility]) injection 4 mg  4 mg IntraVENous Q6H PRN Meli Reddy MD   4 mg at 03/05/22 0301    acetaminophen (TYLENOL) tablet 650 mg  650 mg Oral Q6H PRN Meli Reddy MD        Or    acetaminophen (TYLENOL) suppository 650 mg  650 mg Rectal Q6H PRN Meli Reddy MD        polyethylene glycol (GLYCOLAX) packet 17 g  17 g Oral Daily PRN Meli Reddy MD        aspirin chewable tablet 81 mg  81 mg Oral Daily Meli Reddy MD        atorvastatin (LIPITOR) tablet 40 mg  40 mg Oral Nightly Meli Reddy MD   40 mg at 03/04/22 8021    enoxaparin (LOVENOX) injection 40 mg  40 mg SubCUTAneous Daily Meli Reddy MD        amLODIPine (NORVASC) tablet 10 mg  10 mg Oral Daily Meli Reddy MD           Social History     Socioeconomic History    Marital status: Single     Spouse name: Not on file    Number of children: Not on file    Years of education: Not on file    Highest education level: Not on file   Occupational History    Not on file   Tobacco Use    Smoking status: Former Smoker     Quit date: 2021     Years since quittin.2    Smokeless tobacco: Never Used   Substance and Sexual Activity    Alcohol use: Never    Drug use: Never    Sexual activity: Not on file   Other Topics Concern    Not on file   Social History Narrative    Not on file     Social Determinants of Health     Financial Resource Strain:     Difficulty of Paying Living Expenses: Not on file   Food Insecurity:     Worried About [address] in the Last Year: Not on file    Gibran of Food in the Last Year: Not on file   Transportation Needs:     Lack of Transportation (Medical): Not on file    Lack of Transportation (Non-Medical):  Not on file   Physical Activity:     Days of Exercise per Week: Not on file    Minutes of Exercise per Session: Not on file   Stress:     Feeling of Stress : Not on file   Social Connections:     Frequency of Communication with Friends and Family: Not on file    Frequency of Social Gatherings with Friends and Family: Not on file    Attends Cheondoism Services: Not on file    Active Member of [address] [place] [address] or Organizations: Not on file    Attends Club or Organization Meetings: Not on file    Marital Status: Not on file   Intimate Partner Violence:     Fear of Current or Ex-Partner: Not on file    Emotionally Abused: Not on file    Physically Abused: Not on file    Sexually Abused: Not on file   Housing Stability:     Unable to Pay for Housing in the Last Year: Not on file    Number of [place] in the Last Year: Not on file    Unstable Housing in the Last Year: Not on file       Family History   Problem Relation Age of Onset    Heart Attack Father     Heart Disease Father     Diabetes Brother     Heart Disease Brother     Cancer Brother        Review of Systems:   Heart: as above   Lungs: as above   Eyes: denies changes in vision or discharge. Ears: denies changes in hearing or pain. Nose: denies epistaxis or masses   Throat: denies sore throat or trouble swallowing. Neuro: denies numbness, tingling, tremors. Skin: denies rashes or itching. : denies hematuria, dysuria   GI: denies vomiting, diarrhea   Psych: denies mood changed, anxiety, depression. all others negative. Physical Exam   BP (!) 151/88   Pulse 86   Temp 98.3 °F (36.8 °C)   Resp 18   Ht 5' 11\" (1.803 m)   Wt 196 lb (88.9 kg)   SpO2 98%   BMI 27.34 kg/m²   Constitutional: Oriented to person, place, and time. Well-developed and well-nourished. No distress. Head: Normocephalic and atraumatic. Eyes: EOM are normal. Pupils are equal, round, and reactive to light. Neck: Normal range of motion. Neck supple. No hepatojugular reflux and no JVD present. Carotid bruit is not present. No tracheal deviation present. No thyromegaly present. Cardiovascular: Normal rate, regular rhythm, normal heart sounds and intact distal pulses. Exam reveals no gallop and no friction rub. No murmur heard. Pulmonary/Chest: Effort normal and breath sounds normal. No respiratory distress. No wheezes. No rales. No tenderness. Abdominal: Soft. Bowel sounds are normal. No distension and no mass. No tenderness. No rebound and no guarding. Musculoskeletal: Normal range of motion. No edema and no tenderness. Lymphadenopathy:   No cervical adenopathy. No groin adenopathy. Neurological: Alert and oriented to person, place, and time. Skin: Skin is warm and dry. No rash noted. Not diaphoretic. No erythema. Psychiatric: Normal mood and affect.  Behavior is normal.     CBC:   Lab Results   Component Value Date    WBC 6.9 03/05/2022    RBC 4.89 03/05/2022    HGB 15.6 03/05/2022    HCT 44.6 03/05/2022    MCV 91.2 03/05/2022    MCH 31.9 03/05/2022    MCHC 35.0 03/05/2022    RDW 12.6 03/05/2022     03/05/2022    MPV 10.3 03/05/2022     BMP:   Lab Results   Component Value Date     03/05/2022    K 4.1 03/05/2022    K 4.1 03/04/2022     03/05/2022    CO2 23 03/05/2022    BUN 15 03/05/2022    LABALBU 4.6 03/04/2022    CREATININE 0.8 03/05/2022    CALCIUM 9.1 03/05/2022    GFRAA >60 03/05/2022    LABGLOM >60 03/05/2022     Magnesium:  No results found for: MG  Cardiac Enzymes:   Lab Results   Component Value Date    TROPHS 6 03/04/2022    TROPHS 6 03/04/2022      PT/INR:  No results found for: PROTIME, INR  TSH:  No results found for: TSH    Rhythm Strip: Shows normal sinus rhythm. EKG: Done and reviewed by me. Shows normal sinus rhythm with no concerning ST-T changes    ASSESSMENT AND PLAN:  1. Atypical angina: His pain features seem to be atypical.  They appear to be musculoskeletal in nature. Having said that, he was treated with naproxen without relief. He does have some risk factors and thus it is reasonable to proceed with an exercise myocardial perfusion scan. This will be done tomorrow. 2.  Hypertension: His pressure has been elevated here. Home Norvasc 10 mg daily being continued. I will add Imdur 30 mg daily to his regimen. 3.  He has been started on aspirin 81 mg daily. He is also been started on Lipitor 40 mg daily. Dietary counseling provided. Thank you very much for allowing to participate in the care of your patient! I will continue to follow him along with you. Cj Rodriguez MD  [facility]) Cardiology     NOTE: This report was transcribed using voice recognition software. Every effort was made to ensure accuracy; however, inadvertent computerized transcription errors may be present.

## 2022-03-05 NOTE — ED NOTES
Pt updated on lab results and informed we are just waiting on all of his CT results to come back     Abby Salazar RN  03/04/22 7852

## 2022-03-05 NOTE — H&P
[address] [place] [address] Hospitalist Group   History and Physical      CHIEF COMPLAINT:  Chest pain and left-sided numbness. History of Present Illness:  46 y.o. male with a history of hypertension, remote TIA, asthma, cervical radiculopathy, CRPS of the left upper extremity, bipolar disorder/PTSD and chronic hep C who presents with above complaints. Patient currently incarcerated at [facility] since February 15th. He is not there for substance abuse but states because of behavioral reasons. This program offers work release but patient is on disability from [address] in [address] [facility]. He had suffered lacerated liver and multiple fractures. He states he was DOA he was resuscitated. Patient has been having issues with chest pain on and off for weeks. He had tele-health visit with his provider and they felt he may be suffering from costochondritis. He was prescribed naproxen 500 mg tid prn but had not notice and improvement. He became concerned today as it felt like someone was squeezing his heart. It was hard for him to breath. Pain in chest he described as stabbing. He was concerned as his younger brother age 52 had MI in Oct of this past year. Patient himself had TIA's in past and noticed increased numbness in left side specifically LUE which was newer for him today. He also has had ongoing neck pain and over the last few days it has been difficult for him to move his head from side to side and especially hurts in the back of his neck. In ER, he was given aspirin 324 mg. EKG was obtained and this did not reveal any ischemic changes. High sensitivity troponin x 2 were wnl. They calculate his heart score to be 4 and felt he warranted observation to undergo stress test tomorrow. He has not had one previously. In regards to left sided paresthesias, he had CTA head and neck which did not reveal any acute abnormalities. ER felt we could pursue MRI tomorrow if we felt that was warranted.     Informant(s) for H&P:  Patient and EMR    REVIEW OF SYSTEMS:  As per HPI, otherwise negative     PMH:  Past Medical History:   Diagnosis Date    Arthritis     bilateral thumbs    Asthma     Carpal tunnel syndrome     Cervical radiculopathy     Depression     Headache     Hepatitis C     Hypertension     Inguinal hernia     TIA (transient ischemic attack)        Surgical History:  Past Surgical History:   Procedure Laterality Date    CARPAL TUNNEL RELEASE      FACIAL SURGERY      FINGER AMPUTATION      multiple digits to right hand    HERNIA REPAIR      TONSILLECTOMY         Medications Prior to Admission:    Prior to Admission medications    Medication Sig Start Date End Date Taking? Authorizing Provider   amLODIPine (NORVASC) 10 MG tablet Take 10 mg by mouth daily   Yes Historical Provider, MD   naproxen (NAPROSYN) 500 MG tablet Take 500 mg by mouth 3 times daily (with meals)   Yes Historical Provider, MD   albuterol sulfate HFA (VENTOLIN HFA) 108 (90 Base) MCG/ACT inhaler Inhale 2 puffs into the lungs every 4 hours as needed for Wheezing   Yes Historical Provider, MD       Allergies:    Patient has no known allergies. Social History:    reports that he has been smoking. He has never used smokeless tobacco. He reports that he does not drink alcohol and does not use drugs. Patient states quit alcohol about 30 years ago. Family History:   family history includes Cancer in his brother; Diabetes in his brother; Heart Attack in his father; Heart Disease in his brother and father. Brother with MI age 39. PHYSICAL EXAM:  Vitals:  BP (!) 140/85   Pulse 80   Temp 97.5 °F (36.4 °C) (Temporal)   Resp 18   SpO2 98%   General Appearance: alert and oriented to person, place and time and in no acute distress  Skin: warm and dry  Head: normocephalic and atraumatic  Eyes: pupils equal, round, and reactive to light, extraocular eye movements intact, conjunctivae normal  Neck: tender to palpation on the left.  ROM limited turning head to left or right. Some restriction noted with flexion and extension. Pulmonary/Chest: clear to auscultation bilaterally- no wheezes, rales or rhonchi, normal air movement, no respiratory distress  Cardiovascular: normal rate, normal S1 and S2 and no carotid bruits  Abdomen: soft, non-tender, non-distended, normal bowel sounds, no masses or organomegaly  Extremities: no cyanosis, no clubbing and no edema   Neurologic: no cranial nerve deficit and speech normal; decreased sensation to left upper arm and face.  Decrease sensation to left lower leg (chronic per patient)    LABS:  Recent Labs     03/04/22 1920      K 4.1      CO2 23   BUN 17   CREATININE 0.8   GLUCOSE 158*   CALCIUM 9.5       Recent Labs     03/04/22 1920   WBC 6.9   RBC 5.24   HGB 16.5   HCT 47.1   MCV 89.9   MCH 31.5   MCHC 35.0*   RDW 12.4      MPV 10.0       URINE DRUG SCREEN [[id number]] Collected: 03/04/22 2048     Specimen: Urine, clean catch Updated: 03/05/22 0030      Amphetamine Screen, Urine NOT DETECTED      Barbiturate Screen, Ur NOT DETECTED      Benzodiazepine Screen, Urine NOT DETECTED      Cannabinoid Scrn, Ur NOT DETECTED      Cocaine Metabolite Screen, Urine NOT DETECTED      Opiate Scrn, Ur NOT DETECTED      PCP Screen, Urine NOT DETECTED      Methadone Screen, Urine NOT DETECTED      Oxycodone Urine NOT DETECTED      FENTANYL SCREEN, URINE NOT DETECTED      Drug Screen Comment: see below     Narrative:       add on       Troponin [[id number]] Collected: 03/04/22 2243     Specimen: Blood Updated: 03/04/22 2320      Troponin, High Sensitivity 6 ng/L        Troponin [[id number]] Collected: 03/04/22 1920     Specimen: Blood Updated: 03/04/22 1954      Troponin, High Sensitivity 6 ng/L      Brain Natriuretic Peptide [[id number]] Collected: 03/04/22 1920     Specimen: Blood Updated: 03/04/22 1954      Pro-BNP 8 pg/mL      Lactic Acid [[id number]] Collected: 03/04/22 1920     Specimen: Blood Updated: 03/04/22 1948     Lactic Acid 1.4 mmol/L        Radiology:     XR CHEST PORTABLE    Result Date: 3/4/2022  EXAMINATION: ONE XRAY VIEW OF THE CHEST 3/4/2022 8:05 pm COMPARISON: None. HISTORY: ORDERING SYSTEM PROVIDED HISTORY: chest pain TECHNOLOGIST PROVIDED HISTORY: Reason for exam:->chest pain FINDINGS: Adequate and symmetric aeration of the lungs. There are no formed consolidations, pleural effusions, or pneumothoraces. Trachea and central mainstem bronchi appear clear. Atherosclerotic disease in the aortic arch. The remaining cardiomediastinal silhouette and pulmonary vascularity appear within normal limits. Osseous and thoracic soft tissue structures demonstrate no acute findings. Atherosclerotic disease. No additional evidence of active cardiopulmonary pathology. CTA NECK W CONTRAST    Result Date: 3/4/2022  EXAMINATION: CTA OF THE NECK; CTA OF THE HEAD WITH CONTRAST 3/4/2022 8:24 pm: TECHNIQUE: CTA of the neck was performed with the administration of intravenous contrast. Multiplanar reformatted images are provided for review. MIP images are provided for review. Stenosis of the internal carotid arteries measured using NASCET criteria. Dose modulation, iterative reconstruction, and/or weight based adjustment of the mA/kV was utilized to reduce the radiation dose to as low as reasonably achievable.; CTA of the head/brain was performed with the administration of intravenous contrast. Multiplanar reformatted images are provided for review. MIP images are provided for review. Dose modulation, iterative reconstruction, and/or weight based adjustment of the mA/kV was utilized to reduce the radiation dose to as low as reasonably achievable. Noncontrast CT of the head with reconstructed 2-D images are also provided for review. COMPARISON: None.  HISTORY: ORDERING SYSTEM PROVIDED HISTORY: left sided numbness, left neck pain, headache TECHNOLOGIST PROVIDED HISTORY: Reason for exam:->left sided numbness, left neck pain, headache Has a \"code stroke\" or \"stroke alert\" been called? ->No Decision Support Exception - unselect if not a suspected or confirmed emergency medical condition->Emergency Medical Condition (MA); ORDERING SYSTEM PROVIDED HISTORY: left sided neck pain, numbness, headache TECHNOLOGIST PROVIDED HISTORY: Reason for exam:->left sided neck pain, numbness, headache Has a \"code stroke\" or \"stroke alert\" been called? ->No Decision Support Exception - unselect if not a suspected or confirmed emergency medical condition->Emergency Medical Condition (MA) FINDINGS: CT HEAD: BRAIN/VENTRICLES:  No acute intracranial hemorrhage or extraaxial fluid collection. Grey-white differentiation is maintained. No evidence of mass, mass effect or midline shift. No evidence of hydrocephalus. ORBITS: The visualized portion of the orbits demonstrate no acute abnormality. SINUSES:  The visualized paranasal sinuses and mastoid air cells demonstrate no acute abnormality. SOFT TISSUES/SKULL: No acute abnormality of the visualized skull or soft tissues. CTA NECK: AORTIC ARCH/ARCH VESSELS: No dissection or arterial injury. No significant stenosis of the brachiocephalic or subclavian arteries. CAROTID ARTERIES: No dissection, arterial injury, or hemodynamically significant stenosis by NASCET criteria. There is mild atherosclerotic calcification of bilateral carotid bulbs extending into the origin of internal carotid arteries not resulting in hemodynamically significant stenosis. VERTEBRAL ARTERIES: No dissection, arterial injury, or significant stenosis. SOFT TISSUES: The lung apices are clear. No cervical or superior mediastinal lymphadenopathy. The larynx and pharynx are unremarkable. No acute abnormality of the salivary and thyroid glands. BONES: No acute osseous abnormality. CTA HEAD: ANTERIOR CIRCULATION: No significant stenosis of the intracranial internal carotid, anterior cerebral, or middle cerebral arteries. No aneurysm.  POSTERIOR CIRCULATION: No significant stenosis of the vertebral, basilar, or posterior cerebral arteries. No aneurysm. OTHER: No dural venous sinus thrombosis on this non-dedicated study. 1. No acute intracranial abnormality. No acute territorial infarction, intracranial hemorrhage or mass lesion. 2. Unremarkable CTA of the head and neck. No hemodynamically significant stenosis within head and neck vasculature. RECOMMENDATIONS: Unavailable     CTA HEAD W CONTRAST    Result Date: 3/4/2022  EXAMINATION: CTA OF THE NECK; CTA OF THE HEAD WITH CONTRAST 3/4/2022 8:24 pm: TECHNIQUE: CTA of the neck was performed with the administration of intravenous contrast. Multiplanar reformatted images are provided for review. MIP images are provided for review. Stenosis of the internal carotid arteries measured using NASCET criteria. Dose modulation, iterative reconstruction, and/or weight based adjustment of the mA/kV was utilized to reduce the radiation dose to as low as reasonably achievable.; CTA of the head/brain was performed with the administration of intravenous contrast. Multiplanar reformatted images are provided for review. MIP images are provided for review. Dose modulation, iterative reconstruction, and/or weight based adjustment of the mA/kV was utilized to reduce the radiation dose to as low as reasonably achievable. Noncontrast CT of the head with reconstructed 2-D images are also provided for review. COMPARISON: None. HISTORY: ORDERING SYSTEM PROVIDED HISTORY: left sided numbness, left neck pain, headache TECHNOLOGIST PROVIDED HISTORY: Reason for exam:->left sided numbness, left neck pain, headache Has a \"code stroke\" or \"stroke alert\" been called? ->No Decision Support Exception - unselect if not a suspected or confirmed emergency medical condition->Emergency Medical Condition (MA); ORDERING SYSTEM PROVIDED HISTORY: left sided neck pain, numbness, headache TECHNOLOGIST PROVIDED HISTORY: Reason for exam:->left sided neck pain, numbness, headache Has a \"code stroke\" or \"stroke alert\" been called? ->No Decision Support Exception - unselect if not a suspected or confirmed emergency medical condition->Emergency Medical Condition (MA) FINDINGS: CT HEAD: BRAIN/VENTRICLES:  No acute intracranial hemorrhage or extraaxial fluid collection. Grey-white differentiation is maintained. No evidence of mass, mass effect or midline shift. No evidence of hydrocephalus. ORBITS: The visualized portion of the orbits demonstrate no acute abnormality. SINUSES:  The visualized paranasal sinuses and mastoid air cells demonstrate no acute abnormality. SOFT TISSUES/SKULL: No acute abnormality of the visualized skull or soft tissues. CTA NECK: AORTIC ARCH/ARCH VESSELS: No dissection or arterial injury. No significant stenosis of the brachiocephalic or subclavian arteries. CAROTID ARTERIES: No dissection, arterial injury, or hemodynamically significant stenosis by NASCET criteria. There is mild atherosclerotic calcification of bilateral carotid bulbs extending into the origin of internal carotid arteries not resulting in hemodynamically significant stenosis. VERTEBRAL ARTERIES: No dissection, arterial injury, or significant stenosis. SOFT TISSUES: The lung apices are clear. No cervical or superior mediastinal lymphadenopathy. The larynx and pharynx are unremarkable. No acute abnormality of the salivary and thyroid glands. BONES: No acute osseous abnormality. CTA HEAD: ANTERIOR CIRCULATION: No significant stenosis of the intracranial internal carotid, anterior cerebral, or middle cerebral arteries. No aneurysm. POSTERIOR CIRCULATION: No significant stenosis of the vertebral, basilar, or posterior cerebral arteries. No aneurysm. OTHER: No dural venous sinus thrombosis on this non-dedicated study. 1. No acute intracranial abnormality. No acute territorial infarction, intracranial hemorrhage or mass lesion. 2. Unremarkable CTA of the head and neck.   No hemodynamically significant stenosis within head and neck vasculature. RECOMMENDATIONS: Unavailable       EKG: unavailable for my review but per ER resident \"NSR at rate of 82 with no acute ST elevations or depressions\"    ASSESSMENT:      Principal Problem:    Atypical chest pain  Active Problems:    Cervical radiculopathy    Left sided numbness    Hypertension    Hepatitis C    Asthma    Complex regional pain syndrome type 1 of left upper extremity    PTSD (post-traumatic stress disorder)    Tobacco use disorder    Bipolar disorder ([address]  Resolved Problems:    * No resolved hospital problems. *      PLAN:    1. Atypical chest pain  -HS troponin negative x 2; 8-->6.  -Heart score is 4 so he does warrant evaluation. Exercise nuclear stress ordered and [facility] Cardiology consulted. NPO after midnight.  -continue aspirin 81 mg daily after receiving 324 mg in ER. -Check lipid profile in the morning.   -Patient denies substance abuse but had UDS added to exclude cocaine in case we needed to give BB. 2. Left sided paresthesias/Hx of TIA in the setting of known cervical DJD/spondylosis  -CTA of head and neck unremarkable.  -will consult neurology for opinion and to determine if we should pursue MRI. However, this may very well be exacerbation of his cervical radiculopathy.  -He did have what sounds like Medrol Dosepak recently but did not recall any benefit. To consider another trial of steroids.  -No recent imaging of his C spine in our system but did have xray of C spine in March 2014 which showed straightening of normal cervical curve, multilevel degenerative disc disease and spondylosis. PCP apparently ordered C spine films at their recent phone visit Feb 25th. 3. Hypertension (uncontrolled)  -he is on amlodipine 10 mg daily. This was continued for this admission. 4. Neck pain  -likely due to cervical DJD/spondylosis flare.  -Percocet 5/325 mg q4 prn for severe pain.  -To consider trial of steroids.     5.

## 2022-03-05 NOTE — ED NOTES
This RN taking patient up on tele monitor, pt a/ox4 and in no apparent distress     Reba De Santiago, ARABELLA  03/05/22 0579

## 2022-03-05 NOTE — ED NOTES
Admitting provider at bedside at this time, pt given sandwich and juice at this time per provider rian Zapata RN  03/04/22 0762

## 2022-03-06 ENCOUNTER — APPOINTMENT (OUTPATIENT)
Dept: GENERAL RADIOLOGY | Age: 52
End: 2022-03-06
Payer: MEDICARE

## 2022-03-06 LAB
ANION GAP SERPL CALCULATED.3IONS-SCNC: 10 MMOL/L (ref 7–16)
BUN BLDV-MCNC: 17 MG/DL (ref 6–20)
CALCIUM SERPL-MCNC: 8.9 MG/DL (ref 8.6–10.2)
CHLORIDE BLD-SCNC: 104 MMOL/L (ref 98–107)
CO2: 23 MMOL/L (ref 22–29)
CREAT SERPL-MCNC: 0.9 MG/DL (ref 0.7–1.2)
GFR AFRICAN AMERICAN: >60
GFR NON-AFRICAN AMERICAN: >60 ML/MIN/1.73
GLUCOSE BLD-MCNC: 93 MG/DL (ref 74–99)
LV EF: 68 %
LVEF MODALITY: NORMAL
POTASSIUM SERPL-SCNC: 4.3 MMOL/L (ref 3.5–5)
SODIUM BLD-SCNC: 137 MMOL/L (ref 132–146)
TSH SERPL DL<=0.05 MIU/L-ACNC: 3.16 UIU/ML (ref 0.27–4.2)
URIC ACID, SERUM: 4.9 MG/DL (ref 3.4–7)

## 2022-03-06 PROCEDURE — A9500 TC99M SESTAMIBI: HCPCS | Performed by: RADIOLOGY

## 2022-03-06 PROCEDURE — 36415 COLL VENOUS BLD VENIPUNCTURE: CPT

## 2022-03-06 PROCEDURE — 96366 THER/PROPH/DIAG IV INF ADDON: CPT

## 2022-03-06 PROCEDURE — 96365 THER/PROPH/DIAG IV INF INIT: CPT

## 2022-03-06 PROCEDURE — 99213 OFFICE O/P EST LOW 20 MIN: CPT | Performed by: INTERNAL MEDICINE

## 2022-03-06 PROCEDURE — 6370000000 HC RX 637 (ALT 250 FOR IP): Performed by: FAMILY MEDICINE

## 2022-03-06 PROCEDURE — 99226 PR SBSQ OBSERVATION CARE/DAY 35 MINUTES: CPT | Performed by: FAMILY MEDICINE

## 2022-03-06 PROCEDURE — 96375 TX/PRO/DX INJ NEW DRUG ADDON: CPT

## 2022-03-06 PROCEDURE — 2580000003 HC RX 258: Performed by: FAMILY MEDICINE

## 2022-03-06 PROCEDURE — 78452 HT MUSCLE IMAGE SPECT MULT: CPT | Performed by: INTERNAL MEDICINE

## 2022-03-06 PROCEDURE — 3430000000 HC RX DIAGNOSTIC RADIOPHARMACEUTICAL: Performed by: RADIOLOGY

## 2022-03-06 PROCEDURE — 93016 CV STRESS TEST SUPVJ ONLY: CPT | Performed by: INTERNAL MEDICINE

## 2022-03-06 PROCEDURE — 96372 THER/PROPH/DIAG INJ SC/IM: CPT

## 2022-03-06 PROCEDURE — 73130 X-RAY EXAM OF HAND: CPT

## 2022-03-06 PROCEDURE — 84550 ASSAY OF BLOOD/URIC ACID: CPT

## 2022-03-06 PROCEDURE — 84443 ASSAY THYROID STIM HORMONE: CPT

## 2022-03-06 PROCEDURE — 93018 CV STRESS TEST I&R ONLY: CPT | Performed by: INTERNAL MEDICINE

## 2022-03-06 PROCEDURE — 6370000000 HC RX 637 (ALT 250 FOR IP): Performed by: INTERNAL MEDICINE

## 2022-03-06 PROCEDURE — 73030 X-RAY EXAM OF SHOULDER: CPT

## 2022-03-06 PROCEDURE — G0378 HOSPITAL OBSERVATION PER HR: HCPCS

## 2022-03-06 PROCEDURE — 6360000002 HC RX W HCPCS: Performed by: INTERNAL MEDICINE

## 2022-03-06 PROCEDURE — 80048 BASIC METABOLIC PNL TOTAL CA: CPT

## 2022-03-06 PROCEDURE — 2580000003 HC RX 258: Performed by: INTERNAL MEDICINE

## 2022-03-06 PROCEDURE — 96376 TX/PRO/DX INJ SAME DRUG ADON: CPT

## 2022-03-06 PROCEDURE — 93017 CV STRESS TEST TRACING ONLY: CPT

## 2022-03-06 PROCEDURE — 6360000002 HC RX W HCPCS: Performed by: FAMILY MEDICINE

## 2022-03-06 RX ADMIN — Medication 30 MILLICURIE: at 10:22

## 2022-03-06 RX ADMIN — ISOSORBIDE MONONITRATE 30 MG: 30 TABLET, EXTENDED RELEASE ORAL at 11:41

## 2022-03-06 RX ADMIN — ATORVASTATIN CALCIUM 40 MG: 40 TABLET, FILM COATED ORAL at 19:52

## 2022-03-06 RX ADMIN — SODIUM CHLORIDE, PRESERVATIVE FREE 10 ML: 5 INJECTION INTRAVENOUS at 19:52

## 2022-03-06 RX ADMIN — METHOCARBAMOL 1000 MG: 100 INJECTION, SOLUTION INTRAMUSCULAR; INTRAVENOUS at 01:00

## 2022-03-06 RX ADMIN — SODIUM CHLORIDE, PRESERVATIVE FREE 10 ML: 5 INJECTION INTRAVENOUS at 11:40

## 2022-03-06 RX ADMIN — ENOXAPARIN SODIUM 40 MG: 100 INJECTION SUBCUTANEOUS at 11:40

## 2022-03-06 RX ADMIN — METHOCARBAMOL 1000 MG: 100 INJECTION, SOLUTION INTRAMUSCULAR; INTRAVENOUS at 14:00

## 2022-03-06 RX ADMIN — DICLOFENAC SODIUM TOPICAL GEL, 1%, 2 G: 10 GEL TOPICAL at 17:43

## 2022-03-06 RX ADMIN — KETOROLAC TROMETHAMINE 30 MG: 30 INJECTION, SOLUTION INTRAMUSCULAR; INTRAVENOUS at 11:40

## 2022-03-06 RX ADMIN — REGADENOSON 0.4 MG: 0.08 INJECTION, SOLUTION INTRAVENOUS at 10:19

## 2022-03-06 RX ADMIN — AMLODIPINE BESYLATE 10 MG: 5 TABLET ORAL at 11:41

## 2022-03-06 RX ADMIN — KETOROLAC TROMETHAMINE 30 MG: 30 INJECTION, SOLUTION INTRAMUSCULAR; INTRAVENOUS at 19:52

## 2022-03-06 RX ADMIN — OXYCODONE AND ACETAMINOPHEN 1 TABLET: 5; 325 TABLET ORAL at 22:25

## 2022-03-06 RX ADMIN — OXYCODONE AND ACETAMINOPHEN 1 TABLET: 5; 325 TABLET ORAL at 02:33

## 2022-03-06 RX ADMIN — ASPIRIN 81 MG 81 MG: 81 TABLET ORAL at 11:40

## 2022-03-06 RX ADMIN — OXYCODONE AND ACETAMINOPHEN 1 TABLET: 5; 325 TABLET ORAL at 15:02

## 2022-03-06 RX ADMIN — KETOROLAC TROMETHAMINE 30 MG: 30 INJECTION, SOLUTION INTRAMUSCULAR; INTRAVENOUS at 00:24

## 2022-03-06 ASSESSMENT — PAIN DESCRIPTION - LOCATION
LOCATION: HEAD;NECK
LOCATION: NECK
LOCATION: HEAD;NECK

## 2022-03-06 ASSESSMENT — PAIN DESCRIPTION - PAIN TYPE
TYPE: ACUTE PAIN

## 2022-03-06 ASSESSMENT — PAIN SCALES - GENERAL
PAINLEVEL_OUTOF10: 8
PAINLEVEL_OUTOF10: 7
PAINLEVEL_OUTOF10: 6
PAINLEVEL_OUTOF10: 8
PAINLEVEL_OUTOF10: 7
PAINLEVEL_OUTOF10: 0
PAINLEVEL_OUTOF10: 10
PAINLEVEL_OUTOF10: 8

## 2022-03-06 ASSESSMENT — PAIN DESCRIPTION - DESCRIPTORS
DESCRIPTORS: ACHING;HEADACHE
DESCRIPTORS: CONSTANT;DISCOMFORT
DESCRIPTORS: CONSTANT;DISCOMFORT

## 2022-03-06 ASSESSMENT — PAIN DESCRIPTION - ORIENTATION: ORIENTATION: LEFT

## 2022-03-06 ASSESSMENT — PAIN - FUNCTIONAL ASSESSMENT: PAIN_FUNCTIONAL_ASSESSMENT: PREVENTS OR INTERFERES SOME ACTIVE ACTIVITIES AND ADLS

## 2022-03-06 NOTE — PROGRESS NOTES
INPATIENT CARDIOLOGY FOLLOW-UP    Name: Kelly Lopez    Age: 46 y.o. Date of Admission: 3/4/2022  6:58 PM    Date of Service: 3/6/2022    Primary Cardiologist: New to me from this admission    Chief Complaint: Follow-up for atypical angina    Interim History:  No new overnight cardiac complaints. Currently with no complaints of CP, SOB, palpitations, dizziness, or lightheadedness. SR on telemetry. Complains of headache today. Undergoing Lexiscan myocardial perfusion test today.     Review of Systems:   Negative except as described above    Problem List:  Patient Active Problem List   Diagnosis    Chest pain    Atypical chest pain    Left sided numbness    Hepatitis C    Asthma    Hypertension    Complex regional pain syndrome type 1 of left upper extremity    Cervical radiculopathy    PTSD (post-traumatic stress disorder)    Tobacco use disorder    [facility]    Stroke (cerebrum) ([facility])    Acute pain of left shoulder    Degenerative arthritis of metacarpophalangeal joint of left thumb       Current Medications:    Current Facility-Administered Medications:     oxyCODONE-acetaminophen (PERCOCET) 5-325 MG per tablet 1 tablet, 1 tablet, Oral, Q4H PRN, Anton Colin MD, 1 tablet at 03/06/22 0233    isosorbide mononitrate (IMDUR) extended release tablet 30 mg, 30 mg, Oral, Daily, Johnny Morgan MD, 30 mg at 03/05/22 1401    ketorolac (TORADOL) injection 30 mg, 30 mg, IntraVENous, Q8H PRN, Tresa Adam DO, 30 mg at 03/06/22 0024    methocarbamol (ROBAXIN) 1,000 mg in dextrose 5 % 100 mL IVPB, 1,000 mg, IntraVENous, Q8H PRN, Tresa Adam DO, Stopped at 03/06/22 0130    sodium chloride flush 0.9 % injection 5-40 mL, 5-40 mL, IntraVENous, 2 times per day, Anton Colin MD, 10 mL at 03/05/22 2037    sodium chloride flush 0.9 % injection 5-40 mL, 5-40 mL, IntraVENous, PRN, Anton Colin MD    0.9 % sodium chloride infusion, 25 mL, IntraVENous, PRN, Arlina Breaker, Output --   Net 340 ml     No intake/output data recorded. Laboratory Tests:  Recent Labs     03/04/22 1920 03/05/22  0559 03/06/22  0530    138 137   K 4.1 4.1 4.3    105 104   CO2 23 23 23   BUN 17 15 17   CREATININE 0.8 0.8 0.9   GLUCOSE 158* 88 93   CALCIUM 9.5 9.1 8.9     No results found for: MG  Recent Labs     03/04/22 1920   ALKPHOS 107   ALT 38   AST 21   PROT 7.8   BILITOT 0.4   LABALBU 4.6     Recent Labs     03/04/22 1920 03/05/22  0559   WBC 6.9 6.9   RBC 5.24 4.89   HGB 16.5 15.6   HCT 47.1 44.6   MCV 89.9 91.2   MCH 31.5 31.9   MCHC 35.0* 35.0*   RDW 12.4 12.6    225   MPV 10.0 10.3     No results found for: CKTOTAL, CKMB, CKMBINDEX, TROPONINI  No results found for: INR, PROTIME  No results found for: TSHFT4, TSH  No results found for: LABA1C  No results found for: EAG  Lab Results   Component Value Date    CHOL 170 03/05/2022     Lab Results   Component Value Date    TRIG 81 03/05/2022     Lab Results   Component Value Date    HDL 32 03/05/2022     Lab Results   Component Value Date    LDLCALC 122 (H) 03/05/2022     Lab Results   Component Value Date    LABVLDL 16 03/05/2022     No results found for: [facility]  Recent Labs     03/04/22 1920   PROBNP 8       Cardiac Tests:    ASSESSMENT AND PLAN:  1. Atypical angina: His pain features seem to be atypical.  They appear to be musculoskeletal in nature. Having said that, he was treated with naproxen without relief. He does have some risk factors and thus it is reasonable to proceed with an exercise myocardial perfusion scan. We will follow up on results today. 2.  Hypertension: His pressure has been elevated here. Home Norvasc 10 mg daily being continued. I have added Imdur 30 mg daily to his regimen. He does complain of headaches. May have to discontinue Imdur should his headaches worsen. 3.  He has been started on aspirin 81 mg daily. He is also been started on Lipitor 40 mg daily. Dietary counseling provided. Lipid panel from yesterday reveals a calculated ASCVD score of 8.9%. If stress test is negative for any perfusion defects, he is stable for discharge from a cardiac standpoint with scheduled outpatient follow-up. He will need moderate intensity statin on discharge. Kylie Robertson MD, [address] [place] [address] Cardiology    NOTE: This report was transcribed using voice recognition software. Every effort was made to ensure accuracy; however, inadvertent computerized transcription errors may be present.

## 2022-03-06 NOTE — PROGRESS NOTES
Lexiscan Stress EKG Report: Indication: Atypical angina    Baseline EKG: normal EKG, normal sinus rhythm. Stress EKG: No ST-T changes. Arrhythmias: None. Symptoms: None. Summary:  Unremarkable lexiscan stress EKG. See separate report for stress perfusion results.      Cale Russell MD  Cardiologist  Cardiology, [facility]) Physicians

## 2022-03-06 NOTE — PROGRESS NOTES
[facility] Progress Note    Admitting Date and Time: 3/4/2022  6:58 PM  Admit Dx: Chest pain [R07.9]  Atypical chest pain [R07.89]  Chest pain, unspecified type [R07.9]    Subjective:  Patient is being followed for Chest pain [R07.9]  Atypical chest pain [R07.89]  Chest pain, unspecified type [R07.9]     Pt denies chest pain. His neck and left shoulder still hurt; got a dose of IV Toradol this AM and it helped a lot, but 2nd dose did not. Left sided UE numbness still present without change. States his vision is bad again -- he is seeing spots and vision is blurred. Also now c/o his right pelvic bone hurting. No cough/wheezing/SOB. No events overnight. Per RN: nothing to report    ROS: denies fever, chills, cp, sob, n/v, HA unless stated above.       isosorbide mononitrate  30 mg Oral Daily    sodium chloride flush  5-40 mL IntraVENous 2 times per day    aspirin  81 mg Oral Daily    atorvastatin  40 mg Oral Nightly    enoxaparin  40 mg SubCUTAneous Daily    amLODIPine  10 mg Oral Daily     oxyCODONE-acetaminophen, 1 tablet, Q4H PRN  ketorolac, 30 mg, Q8H PRN  methocarbamol IVPB, 1,000 mg, Q8H PRN  sodium chloride flush, 5-40 mL, PRN  sodium chloride, 25 mL, PRN  ondansetron, 4 mg, Q8H PRN   Or  ondansetron, 4 mg, Q6H PRN  acetaminophen, 650 mg, Q6H PRN   Or  acetaminophen, 650 mg, Q6H PRN  polyethylene glycol, 17 g, Daily PRN         Objective:    /67   Pulse 64   Temp 97.7 °F (36.5 °C) (Oral)   Resp 16   Ht 5' 11\" (1.803 m)   Wt 196 lb 3.2 oz (89 kg)   SpO2 93%   BMI 27.36 kg/m²   General Appearance: alert and oriented to person, place and time and in no acute distress  Skin: warm and dry, good turgor, no rashes, no jaundice  Head: normocephalic and atraumatic  Eyes: pupils equal, round, and reactive to light, extraocular eye movements intact, conjunctivae normal  Neck: neck supple and non tender without mass   Pulmonary/Chest: clear to auscultation bilaterally- no wheezes, rales or rhonchi, normal air movement, no respiratory distress  Cardiovascular: normal rate, normal S1 and S2 and no carotid bruits  Abdomen: soft, non-tender, non-distended, normal bowel sounds, no masses or organomegaly  Extremities: no cyanosis, no clubbing and no edema  Neurologic: no cranial nerve deficit and speech normal        Recent Labs     03/04/22 1920 03/05/22  0559 03/06/22  0530    138 137   K 4.1 4.1 4.3    105 104   CO2 23 23 23   BUN 17 15 17   CREATININE 0.8 0.8 0.9   GLUCOSE 158* 88 93   CALCIUM 9.5 9.1 8.9       Recent Labs     03/04/22 1920 03/05/22  0559   WBC 6.9 6.9   RBC 5.24 4.89   HGB 16.5 15.6   HCT 47.1 44.6   MCV 89.9 91.2   MCH 31.5 31.9   MCHC 35.0* 35.0*   RDW 12.4 12.6    225   MPV 10.0 10.3       Radiology:   None new    Nuclear stress test -- pending    Assessment:    Principal Problem:    Stroke (cerebrum) ([facility])  Active Problems:    Atypical chest pain    Left sided numbness    Hepatitis C    Asthma    Hypertension    Complex regional pain syndrome type 1 of left upper extremity    Cervical radiculopathy    PTSD (post-traumatic stress disorder)    Tobacco use disorder    Bipolar disorder ([facility])    Acute pain of left shoulder    Degenerative arthritis of metacarpophalangeal joint of left thumb  Resolved Problems:    * No resolved hospital problems. *    Still with severe neck pain, left shoulder pain, and numbness left LE without change, and vision is blurry again and \"seeing spots\". Toradol helped for a few hours.     Left shoulder films -- DJD, otherwise non-acute  Left thumb MCP joint -- mild inflammatory arthritis    Plan:  ) Left sided chest pain -- now resolved, could be musculoskeletal with muscle spasm, possibly costo-chrondritis  -Robaxin prn  -Await nuc stress test -- second part done this AM, await reading  -Statin, aspirin for now  -Cardiology following     2) Left hand numbness/radicular pain left shoulder -- could be due to stroke (not likely), versus left neck radicular pathology versus left shoulder bursitis with inflammation of sheree-neural track  -Toradol for just 1 more dose  -Diclofenac gel to left shoulder  -Ice prn  -Statin, aspirin for now  -Consider ortho consultation  -MRI of neck in AM  -MRI of the brain in the AM  -Check TSH     3) Chronic hep C -- stable     4) Elevated BP -- likely uncontrolled essential HTN -- now controlled -- Norvasc 10 mg daily, Imdur 30 mg daily per cardiology     5) Polysubstance abuse -- doing inpatient rehab now  -Discharge back to facility     6) Left MCP joint of thumb arthritis -- pt states recent development -- most likely gout (states his father has gout)  -Toradol  -Consider starting indocin soon (also consider colchicine)  -consider ortho consult  -Check uric acid     DISP: back to rehab facility likely tomorrow if MRI brain and nuc strerss are normal     DVT Prophylaxis: Lovenox 40 mg      NOTE: This report was transcribed using voice recognition software. Every effort was made to ensure accuracy; however, inadvertent computerized transcription errors may be present.     Electronically signed by Skyler Rebollar DO on 3/6/2022 at 1:58 PM

## 2022-03-06 NOTE — PROGRESS NOTES
Above noted. Its anatomically impossible for cervical radiculopathy to cause both arm AND LEG unilateral numbness which is reason for clinical diagnosis of stroke.

## 2022-03-07 ENCOUNTER — APPOINTMENT (OUTPATIENT)
Dept: MRI IMAGING | Age: 52
End: 2022-03-07
Payer: MEDICARE

## 2022-03-07 VITALS
BODY MASS INDEX: 27.47 KG/M2 | TEMPERATURE: 97.7 F | HEIGHT: 71 IN | WEIGHT: 196.2 LBS | DIASTOLIC BLOOD PRESSURE: 79 MMHG | SYSTOLIC BLOOD PRESSURE: 122 MMHG | OXYGEN SATURATION: 93 % | RESPIRATION RATE: 18 BRPM | HEART RATE: 68 BPM

## 2022-03-07 LAB
ANION GAP SERPL CALCULATED.3IONS-SCNC: 10 MMOL/L (ref 7–16)
ANTI-NUCLEAR ANTIBODY (ANA): NEGATIVE
BUN BLDV-MCNC: 15 MG/DL (ref 6–20)
CALCIUM SERPL-MCNC: 8.7 MG/DL (ref 8.6–10.2)
CHLORIDE BLD-SCNC: 104 MMOL/L (ref 98–107)
CO2: 23 MMOL/L (ref 22–29)
CREAT SERPL-MCNC: 0.8 MG/DL (ref 0.7–1.2)
GFR AFRICAN AMERICAN: >60
GFR NON-AFRICAN AMERICAN: >60 ML/MIN/1.73
GLUCOSE BLD-MCNC: 99 MG/DL (ref 74–99)
POTASSIUM SERPL-SCNC: 4 MMOL/L (ref 3.5–5)
RPR: NORMAL
SODIUM BLD-SCNC: 137 MMOL/L (ref 132–146)

## 2022-03-07 PROCEDURE — 96376 TX/PRO/DX INJ SAME DRUG ADON: CPT

## 2022-03-07 PROCEDURE — 6360000002 HC RX W HCPCS: Performed by: FAMILY MEDICINE

## 2022-03-07 PROCEDURE — 6370000000 HC RX 637 (ALT 250 FOR IP): Performed by: INTERNAL MEDICINE

## 2022-03-07 PROCEDURE — 36415 COLL VENOUS BLD VENIPUNCTURE: CPT

## 2022-03-07 PROCEDURE — 96375 TX/PRO/DX INJ NEW DRUG ADDON: CPT

## 2022-03-07 PROCEDURE — 72141 MRI NECK SPINE W/O DYE: CPT

## 2022-03-07 PROCEDURE — 96372 THER/PROPH/DIAG INJ SC/IM: CPT

## 2022-03-07 PROCEDURE — 2580000003 HC RX 258: Performed by: FAMILY MEDICINE

## 2022-03-07 PROCEDURE — 80048 BASIC METABOLIC PNL TOTAL CA: CPT

## 2022-03-07 PROCEDURE — 96366 THER/PROPH/DIAG IV INF ADDON: CPT

## 2022-03-07 PROCEDURE — 2580000003 HC RX 258: Performed by: INTERNAL MEDICINE

## 2022-03-07 PROCEDURE — 99217 PR OBSERVATION CARE DISCHARGE MANAGEMENT: CPT | Performed by: FAMILY MEDICINE

## 2022-03-07 PROCEDURE — 6360000002 HC RX W HCPCS: Performed by: INTERNAL MEDICINE

## 2022-03-07 PROCEDURE — 70551 MRI BRAIN STEM W/O DYE: CPT

## 2022-03-07 PROCEDURE — G0378 HOSPITAL OBSERVATION PER HR: HCPCS

## 2022-03-07 RX ORDER — LORAZEPAM 2 MG/ML
1 INJECTION INTRAMUSCULAR ONCE
Status: COMPLETED | OUTPATIENT
Start: 2022-03-07 | End: 2022-03-07

## 2022-03-07 RX ORDER — AMLODIPINE BESYLATE 10 MG/1
10 TABLET ORAL DAILY
Qty: 30 TABLET | Refills: 0 | Status: SHIPPED | OUTPATIENT
Start: 2022-03-07

## 2022-03-07 RX ORDER — INDOMETHACIN 50 MG/1
50 CAPSULE ORAL 3 TIMES DAILY
Qty: 21 CAPSULE | Refills: 0 | Status: SHIPPED | OUTPATIENT
Start: 2022-03-07 | End: 2022-03-14

## 2022-03-07 RX ORDER — ASPIRIN 81 MG/1
81 TABLET, CHEWABLE ORAL DAILY
Qty: 30 TABLET | Refills: 3 | Status: SHIPPED | OUTPATIENT
Start: 2022-03-08

## 2022-03-07 RX ORDER — ATORVASTATIN CALCIUM 40 MG/1
40 TABLET, FILM COATED ORAL NIGHTLY
Qty: 90 TABLET | Refills: 0 | Status: SHIPPED | OUTPATIENT
Start: 2022-03-07

## 2022-03-07 RX ORDER — METHOCARBAMOL 750 MG/1
750 TABLET, FILM COATED ORAL 3 TIMES DAILY PRN
Qty: 20 TABLET | Refills: 0 | Status: SHIPPED | OUTPATIENT
Start: 2022-03-07

## 2022-03-07 RX ADMIN — KETOROLAC TROMETHAMINE 30 MG: 30 INJECTION, SOLUTION INTRAMUSCULAR; INTRAVENOUS at 03:53

## 2022-03-07 RX ADMIN — ENOXAPARIN SODIUM 40 MG: 100 INJECTION SUBCUTANEOUS at 09:22

## 2022-03-07 RX ADMIN — LORAZEPAM 1 MG: 2 INJECTION INTRAMUSCULAR; INTRAVENOUS at 13:35

## 2022-03-07 RX ADMIN — ISOSORBIDE MONONITRATE 30 MG: 30 TABLET, EXTENDED RELEASE ORAL at 09:22

## 2022-03-07 RX ADMIN — DICLOFENAC SODIUM TOPICAL GEL, 1%, 2 G: 10 GEL TOPICAL at 16:32

## 2022-03-07 RX ADMIN — ASPIRIN 81 MG 81 MG: 81 TABLET ORAL at 09:23

## 2022-03-07 RX ADMIN — METHOCARBAMOL 1000 MG: 100 INJECTION, SOLUTION INTRAMUSCULAR; INTRAVENOUS at 00:04

## 2022-03-07 RX ADMIN — OXYCODONE AND ACETAMINOPHEN 1 TABLET: 5; 325 TABLET ORAL at 09:27

## 2022-03-07 RX ADMIN — METHOCARBAMOL 1000 MG: 100 INJECTION, SOLUTION INTRAMUSCULAR; INTRAVENOUS at 10:28

## 2022-03-07 RX ADMIN — ACETAMINOPHEN 650 MG: 325 TABLET ORAL at 02:49

## 2022-03-07 RX ADMIN — AMLODIPINE BESYLATE 10 MG: 5 TABLET ORAL at 09:22

## 2022-03-07 RX ADMIN — DICLOFENAC SODIUM TOPICAL GEL, 1%, 2 G: 10 GEL TOPICAL at 09:28

## 2022-03-07 RX ADMIN — SODIUM CHLORIDE, PRESERVATIVE FREE 10 ML: 5 INJECTION INTRAVENOUS at 09:24

## 2022-03-07 RX ADMIN — KETOROLAC TROMETHAMINE 30 MG: 30 INJECTION, SOLUTION INTRAMUSCULAR; INTRAVENOUS at 11:27

## 2022-03-07 RX ADMIN — OXYCODONE AND ACETAMINOPHEN 1 TABLET: 5; 325 TABLET ORAL at 15:51

## 2022-03-07 ASSESSMENT — PAIN DESCRIPTION - DESCRIPTORS
DESCRIPTORS: ACHING;POUNDING;PRESSURE
DESCRIPTORS: CONSTANT;PRESSURE;THROBBING

## 2022-03-07 ASSESSMENT — PAIN DESCRIPTION - PAIN TYPE
TYPE: ACUTE PAIN
TYPE: ACUTE PAIN

## 2022-03-07 ASSESSMENT — PAIN SCALES - GENERAL
PAINLEVEL_OUTOF10: 8
PAINLEVEL_OUTOF10: 0
PAINLEVEL_OUTOF10: 8
PAINLEVEL_OUTOF10: 8
PAINLEVEL_OUTOF10: 10

## 2022-03-07 ASSESSMENT — PAIN DESCRIPTION - LOCATION: LOCATION: HEAD;NECK

## 2022-03-07 NOTE — CARE COORDINATION
3/7/2022 1131 CM note: No covid testing. Met with patient for transition of care needs. Pt presented from [address] [place] [address] . Per Ruthann,supervisor, at [address] [place] [address],[address], pt can return to [address] [place] [address],[address] at discharge and they can provide transportation back. Pt agreeable to return to [facility] at OR. His PCP is BORA Ojeda in [place] and uses [facility]. Pt for MRI today. [address] [place] [address],[address] will need notified when pt is to discharge([phone]).   Sid BELL

## 2022-03-07 NOTE — PROGRESS NOTES
Orthostatic BP/Pulse  Laying /81 Pulse 76  Sitting 121/88  Pulse 89  Standing 133/89  Pulse 89  Pt stated he was \" a little bit dizzy\" when standing and he was seeing \"polka dots\"

## 2022-03-07 NOTE — DISCHARGE INSTR - DIET

## 2022-03-07 NOTE — DISCHARGE SUMMARY
[facility] Physician Discharge Summary       Simon Rodriguez MD  [address]  [address] [address]  [phone]    Schedule an appointment as soon as possible for a visit in 1 week  See this physician for your pinched nerves left side of neck caused by DJD of the neck -- especially if pain not better in a week. Jarred Fell  [address]  [address] [address]  [phone]    In 3 days  Hospital follow up    Cj Rodriguez MD  [facility] [address]. [address] [address]  [phone]    In 4 weeks  Follow up for maximization of cardiac risk factor reduction      Activity level: no strenuous activity    Diet: ADULT DIET; Regular    Labs: none    Condition at discharge: stable    Dispo: back to [facility] (inpatient drug/alcohol rehab)      Patient ID:  Drake Noss  [id number]  46 y.o.  1970    Admit date: 3/4/2022    Discharge date and time:  3/7/2022  4:41 PM    Admission Diagnoses: Principal Problem:    Stroke (cerebrum) ([facility]  Active Problems:    Atypical chest pain    Left sided numbness    Hepatitis C    Asthma    Hypertension    Complex regional pain syndrome type 1 of left upper extremity    Cervical radiculopathy    PTSD (post-traumatic stress disorder)    Tobacco use disorder    Bipolar disorder (HCC)    Acute pain of left shoulder    Degenerative arthritis of metacarpophalangeal joint of left thumb  Resolved Problems:    * No resolved hospital problems.  *      Discharge Diagnoses: Principal Problem:    Stroke (cerebrum) ([facility]  Active Problems:    Atypical chest pain    Left sided numbness    Hepatitis C    Asthma    Hypertension    Complex regional pain syndrome type 1 of left upper extremity    Cervical radiculopathy    PTSD (post-traumatic stress disorder)    Tobacco use disorder    Bipolar disorder (HCC)    Acute pain of left shoulder    Degenerative arthritis of metacarpophalangeal joint of left thumb  Resolved Problems:    * No resolved hospital problems. 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 He also has left LE numbness but he states that is not new and he's had that because of pinched nerve in the lumbar spine. Stroke was suspected -- CTA of the head/neck in the ER was negative. Seen by neurology. MRI of the brain was normal.      Also c/o left sided chest pain -- this is likely musculoskeletal -- nuclear stress testing normal.    MRI of cervical spine reveals neural formaminal and some central canal stenosis of 9 mm at 3 levels -- see report below. This is consistent with his significant neurological sx's in his left UE and correlates with his type of pain. Patient is in drug/alcohol rehab, so cannot give him opiates or any narcotics (such as gabapentin) for nerve pain. Will use  Non-narcotic muscle relaxer/NSAID's and ice/heat for relief for now and outpatient follow up with a [address] [place] [address] Neurosurgery. Has acute inflammation of left MCP joint -- x-ray c/w joint inflammation. Likely is gout. His father has gout -- treat with Indocin for 1 week only. Could treat with a course of steroids as well (this may also help his radicular neck pain -- could use a Medrol Dose pack). Left shoulder pain as well -- very tender to touch at the shoulder bursa. X-rays showed joint mild DJD with no active inflammation.   Can use diclofenac gel for now, consider outpatient ortho appointment for joint injection. BP's -- uncontrolled when he came in. Imdur started by cardiology, however, BP becomes borderline low and developed HA on that. Will send out on Norvasc only for now, 10 mg daily -- Imdur, etc. Can be added in the near future depending on BP trend. Today -- pt states pain in neck, shoulder still present without much change. Given Toradol X 2 doses which did help. States he is dizzy and has a headache -- this is likely the Imdur. Denies any chest pain. No shortness of breath. Will stop Imdur for discharge. Discharge Exam:  Alert, in NAD, not in respiratory distress, breathing comfortably on RA at rest in bed, mental status normal, well appearing  Eyes and mouth clear, moist, tongue and uvula midline, neck supple, no JVD  S1/S2 with RRR, no M/R/G  Lungs CTA bilaterally, no W/R/R  Abd soft/NT/ND/normoactive BS's  No LE edema, pos pedal pulses, no cyanosis, good cap refill of digits  Skin warm, dry, good turgor, no rashes, no jaundice   PERRL, EOMI, CN's 2-12 intact, UE and LE strength OK, left UE strength decreased due to pain, left shoulder limited in abduction due to pain      I/O last 3 completed shifts: In: 321.5 [P.O.:120; IV Piggyback:201.5]  Out: -   I/O this shift: In: 720 [P.O.:720]  Out: -       LABS:  Recent Labs     03/05/22  0559 03/06/22  0530 03/07/22  0618    137 137   K 4.1 4.3 4.0    104 104   CO2 23 23 23   BUN 15 17 15   CREATININE 0.8 0.9 0.8   GLUCOSE 88 93 99   CALCIUM 9.1 8.9 8.7       Recent Labs     03/04/22  1920 03/05/22  0559   WBC 6.9 6.9   RBC 5.24 4.89   HGB 16.5 15.6   HCT 47.1 44.6   MCV 89.9 91.2   MCH 31.5 31.9   MCHC 35.0* 35.0*   RDW 12.4 12.6    225   MPV 10.0 10.3       No results for input(s): POCGLU in the last 72 hours. Imaging:  XR CHEST PORTABLE  Result Date: 3/4/2022  EXAMINATION: ONE XRAY VIEW OF THE CHEST 3/4/2022 8:05 pm COMPARISON: None.  HISTORY: ORDERING SYSTEM PROVIDED HISTORY: chest pain TECHNOLOGIST PROVIDED HISTORY: Reason for exam:->chest pain FINDINGS: Adequate and symmetric aeration of the lungs. There are no formed consolidations, pleural effusions, or pneumothoraces. Trachea and central mainstem bronchi appear clear. Atherosclerotic disease in the aortic arch. The remaining cardiomediastinal silhouette and pulmonary vascularity appear within normal limits. Osseous and thoracic soft tissue structures demonstrate no acute findings. Atherosclerotic disease. No additional evidence of active cardiopulmonary pathology. CTA NECK W CONTRAST  Result Date: 3/4/2022  EXAMINATION: CTA OF THE NECK; CTA OF THE HEAD WITH CONTRAST 3/4/2022 8:24 pm: TECHNIQUE: CTA of the neck was performed with the administration of intravenous contrast. Multiplanar reformatted images are provided for review. MIP images are provided for review. Stenosis of the internal carotid arteries measured using NASCET criteria. Dose modulation, iterative reconstruction, and/or weight based adjustment of the mA/kV was utilized to reduce the radiation dose to as low as reasonably achievable.; CTA of the head/brain was performed with the administration of intravenous contrast. Multiplanar reformatted images are provided for review. MIP images are provided for review. Dose modulation, iterative reconstruction, and/or weight based adjustment of the mA/kV was utilized to reduce the radiation dose to as low as reasonably achievable. Noncontrast CT of the head with reconstructed 2-D images are also provided for review. COMPARISON: None. HISTORY: ORDERING SYSTEM PROVIDED HISTORY: left sided numbness, left neck pain, headache TECHNOLOGIST PROVIDED HISTORY: Reason for exam:->left sided numbness, left neck pain, headache Has a \"code stroke\" or \"stroke alert\" been called? ->No Decision Support Exception - unselect if not a suspected or confirmed emergency medical condition->Emergency Medical Condition (MA); ORDERING SYSTEM PROVIDED HISTORY: left sided neck pain, numbness, headache TECHNOLOGIST PROVIDED HISTORY: Reason for exam:->left sided neck pain, numbness, headache Has a \"code stroke\" or \"stroke alert\" been called? ->No Decision Support Exception - unselect if not a suspected or confirmed emergency medical condition->Emergency Medical Condition (MA) FINDINGS: CT HEAD: BRAIN/VENTRICLES:  No acute intracranial hemorrhage or extraaxial fluid collection. Grey-white differentiation is maintained. No evidence of mass, mass effect or midline shift. No evidence of hydrocephalus. ORBITS: The visualized portion of the orbits demonstrate no acute abnormality. SINUSES:  The visualized paranasal sinuses and mastoid air cells demonstrate no acute abnormality. SOFT TISSUES/SKULL: No acute abnormality of the visualized skull or soft tissues. CTA NECK: AORTIC ARCH/ARCH VESSELS: No dissection or arterial injury. No significant stenosis of the brachiocephalic or subclavian arteries. CAROTID ARTERIES: No dissection, arterial injury, or hemodynamically significant stenosis by NASCET criteria. There is mild atherosclerotic calcification of bilateral carotid bulbs extending into the origin of internal carotid arteries not resulting in hemodynamically significant stenosis. VERTEBRAL ARTERIES: No dissection, arterial injury, or significant stenosis. SOFT TISSUES: The lung apices are clear. No cervical or superior mediastinal lymphadenopathy. The larynx and pharynx are unremarkable. No acute abnormality of the salivary and thyroid glands. BONES: No acute osseous abnormality. CTA HEAD: ANTERIOR CIRCULATION: No significant stenosis of the intracranial internal carotid, anterior cerebral, or middle cerebral arteries. No aneurysm. POSTERIOR CIRCULATION: No significant stenosis of the vertebral, basilar, or posterior cerebral arteries. No aneurysm. OTHER: No dural venous sinus thrombosis on this non-dedicated study.    1. No acute intracranial abnormality. No acute territorial infarction, intracranial hemorrhage or mass lesion. 2. Unremarkable CTA of the head and neck. No hemodynamically significant stenosis within head and neck vasculature. RECOMMENDATIONS: Unavailable     CTA HEAD W CONTRAST  Result Date: 3/4/2022  EXAMINATION: CTA OF THE NECK; CTA OF THE HEAD WITH CONTRAST 3/4/2022 8:24 pm: TECHNIQUE: CTA of the neck was performed with the administration of intravenous contrast. Multiplanar reformatted images are provided for review. MIP images are provided for review. Stenosis of the internal carotid arteries measured using NASCET criteria. Dose modulation, iterative reconstruction, and/or weight based adjustment of the mA/kV was utilized to reduce the radiation dose to as low as reasonably achievable.; CTA of the head/brain was performed with the administration of intravenous contrast. Multiplanar reformatted images are provided for review. MIP images are provided for review. Dose modulation, iterative reconstruction, and/or weight based adjustment of the mA/kV was utilized to reduce the radiation dose to as low as reasonably achievable. Noncontrast CT of the head with reconstructed 2-D images are also provided for review. COMPARISON: None. HISTORY: ORDERING SYSTEM PROVIDED HISTORY: left sided numbness, left neck pain, headache TECHNOLOGIST PROVIDED HISTORY: Reason for exam:->left sided numbness, left neck pain, headache Has a \"code stroke\" or \"stroke alert\" been called? ->No Decision Support Exception - unselect if not a suspected or confirmed emergency medical condition->Emergency Medical Condition (MA); ORDERING SYSTEM PROVIDED HISTORY: left sided neck pain, numbness, headache TECHNOLOGIST PROVIDED HISTORY: Reason for exam:->left sided neck pain, numbness, headache Has a \"code stroke\" or \"stroke alert\" been called? ->No Decision Support Exception - unselect if not a suspected or confirmed emergency medical condition->Emergency Medical Condition (MA) FINDINGS: CT HEAD: BRAIN/VENTRICLES:  No acute intracranial hemorrhage or extraaxial fluid collection. Grey-white differentiation is maintained. No evidence of mass, mass effect or midline shift. No evidence of hydrocephalus. ORBITS: The visualized portion of the orbits demonstrate no acute abnormality. SINUSES:  The visualized paranasal sinuses and mastoid air cells demonstrate no acute abnormality. SOFT TISSUES/SKULL: No acute abnormality of the visualized skull or soft tissues. CTA NECK: AORTIC ARCH/ARCH VESSELS: No dissection or arterial injury. No significant stenosis of the brachiocephalic or subclavian arteries. CAROTID ARTERIES: No dissection, arterial injury, or hemodynamically significant stenosis by NASCET criteria. There is mild atherosclerotic calcification of bilateral carotid bulbs extending into the origin of internal carotid arteries not resulting in hemodynamically significant stenosis. VERTEBRAL ARTERIES: No dissection, arterial injury, or significant stenosis. SOFT TISSUES: The lung apices are clear. No cervical or superior mediastinal lymphadenopathy. The larynx and pharynx are unremarkable. No acute abnormality of the salivary and thyroid glands. BONES: No acute osseous abnormality. CTA HEAD: ANTERIOR CIRCULATION: No significant stenosis of the intracranial internal carotid, anterior cerebral, or middle cerebral arteries. No aneurysm. POSTERIOR CIRCULATION: No significant stenosis of the vertebral, basilar, or posterior cerebral arteries. No aneurysm. OTHER: No dural venous sinus thrombosis on this non-dedicated study. 1. No acute intracranial abnormality. No acute territorial infarction, intracranial hemorrhage or mass lesion. 2. Unremarkable CTA of the head and neck. No hemodynamically significant stenosis within head and neck vasculature.  RECOMMENDATIONS: Unavailable         Patient Instructions:   Current Discharge Medication List      START taking these medications    Details   aspirin 81 MG chewable tablet Take 1 tablet by mouth daily  Qty: 30 tablet, Refills: 3      diclofenac sodium (VOLTAREN) 1 % GEL Apply 2 g topically 4 times daily as needed for Pain (left shoulder pain - apply to bursa site)  Qty: 30 g, Refills: 1      atorvastatin (LIPITOR) 40 MG tablet Take 1 tablet by mouth nightly  Qty: 90 tablet, Refills: 0      methocarbamol (ROBAXIN-750) 750 MG tablet Take 1 tablet by mouth 3 times daily as needed (left neck muscle spasm)  Qty: 20 tablet, Refills: 0      indomethacin (INDOCIN) 50 MG capsule Take 1 capsule by mouth 3 times daily for 7 days  Qty: 21 capsule, Refills: 0    Associated Diagnoses: Complex regional pain syndrome type 1 of left upper extremity; Cervical radiculopathy; Degenerative arthritis of metacarpophalangeal joint of left thumb         CONTINUE these medications which have CHANGED    Details   amLODIPine (NORVASC) 10 MG tablet Take 1 tablet by mouth daily  Qty: 30 tablet, Refills: 0         CONTINUE these medications which have NOT CHANGED    Details   albuterol sulfate HFA (VENTOLIN HFA) 108 (90 Base) MCG/ACT inhaler Inhale 2 puffs into the lungs every 4 hours as needed for Wheezing         STOP taking these medications       naproxen (NAPROSYN) 500 MG tablet Comments:   Reason for Stopping:                 Note that less than 30 minutes was spent in preparing discharge papers, discussing discharge with patient, medication review, etc.    NOTE: This report was transcribed using voice recognition software. Every effort was made to ensure accuracy; however, inadvertent computerized transcription errors may be present.      Signed:  Electronically signed by Armin Chase DO on 3/7/2022 at 4:41 PM

## 2022-03-07 NOTE — PROGRESS NOTES
CLINICAL PHARMACY NOTE: MEDS TO BEDS    Total # of Prescriptions Filled: 2   The following medications were delivered to the patient:  · Robaxin 750mg  · lipitor 40mg  ·     Additional Documentation:

## 2022-03-08 LAB
C. TRACHOMATIS DNA ,URINE: NEGATIVE
N. GONORRHOEAE DNA, URINE: NEGATIVE
SOURCE: NORMAL

## 2022-03-11 ENCOUNTER — APPOINTMENT (OUTPATIENT)
Dept: GENERAL RADIOLOGY | Age: 52
End: 2022-03-11
Payer: MEDICARE

## 2022-03-11 ENCOUNTER — HOSPITAL ENCOUNTER (EMERGENCY)
Age: 52
Discharge: HOME | End: 2022-03-11
Attending: EMERGENCY MEDICINE
Payer: MEDICARE

## 2022-03-11 VITALS
TEMPERATURE: 97.7 F | OXYGEN SATURATION: 94 % | RESPIRATION RATE: 18 BRPM | HEART RATE: 100 BPM | DIASTOLIC BLOOD PRESSURE: 91 MMHG | SYSTOLIC BLOOD PRESSURE: 139 MMHG

## 2022-03-11 DIAGNOSIS — R07.9 CHEST PAIN, UNSPECIFIED TYPE: Primary | ICD-10-CM

## 2022-03-11 LAB
ANION GAP SERPL CALCULATED.3IONS-SCNC: 12 MMOL/L (ref 7–16)
BASOPHILS ABSOLUTE: 0.08 E9/L (ref 0–0.2)
BASOPHILS RELATIVE PERCENT: 1 % (ref 0–2)
BUN BLDV-MCNC: 15 MG/DL (ref 6–20)
CALCIUM SERPL-MCNC: 9.3 MG/DL (ref 8.6–10.2)
CHLORIDE BLD-SCNC: 102 MMOL/L (ref 98–107)
CO2: 24 MMOL/L (ref 22–29)
CREAT SERPL-MCNC: 0.8 MG/DL (ref 0.7–1.2)
D DIMER: <200 NG/ML DDU
EKG ATRIAL RATE: 103 BPM
EKG P AXIS: 82 DEGREES
EKG P-R INTERVAL: 136 MS
EKG Q-T INTERVAL: 336 MS
EKG QRS DURATION: 82 MS
EKG QTC CALCULATION (BAZETT): 440 MS
EKG R AXIS: 83 DEGREES
EKG T AXIS: 74 DEGREES
EKG VENTRICULAR RATE: 103 BPM
EOSINOPHILS ABSOLUTE: 0.16 E9/L (ref 0.05–0.5)
EOSINOPHILS RELATIVE PERCENT: 2 % (ref 0–6)
GFR AFRICAN AMERICAN: >60
GFR NON-AFRICAN AMERICAN: >60 ML/MIN/1.73
GLUCOSE BLD-MCNC: 135 MG/DL (ref 74–99)
HCT VFR BLD CALC: 44.5 % (ref 37–54)
HEMOGLOBIN: 16.2 G/DL (ref 12.5–16.5)
IMMATURE GRANULOCYTES #: 0.02 E9/L
IMMATURE GRANULOCYTES %: 0.3 % (ref 0–5)
LYMPHOCYTES ABSOLUTE: 1.8 E9/L (ref 1.5–4)
LYMPHOCYTES RELATIVE PERCENT: 23 % (ref 20–42)
MCH RBC QN AUTO: 32.5 PG (ref 26–35)
MCHC RBC AUTO-ENTMCNC: 36.4 % (ref 32–34.5)
MCV RBC AUTO: 89.2 FL (ref 80–99.9)
MONOCYTES ABSOLUTE: 0.77 E9/L (ref 0.1–0.95)
MONOCYTES RELATIVE PERCENT: 9.8 % (ref 2–12)
NEUTROPHILS ABSOLUTE: 5.01 E9/L (ref 1.8–7.3)
NEUTROPHILS RELATIVE PERCENT: 63.9 % (ref 43–80)
PDW BLD-RTO: 12.4 FL (ref 11.5–15)
PLATELET # BLD: 245 E9/L (ref 130–450)
PMV BLD AUTO: 10 FL (ref 7–12)
POTASSIUM SERPL-SCNC: 4.1 MMOL/L (ref 3.5–5)
PRO-BNP: <5 PG/ML (ref 0–125)
RBC # BLD: 4.99 E12/L (ref 3.8–5.8)
SODIUM BLD-SCNC: 138 MMOL/L (ref 132–146)
TROPONIN, HIGH SENSITIVITY: 8 NG/L (ref 0–11)
WBC # BLD: 7.8 E9/L (ref 4.5–11.5)

## 2022-03-11 PROCEDURE — 96372 THER/PROPH/DIAG INJ SC/IM: CPT

## 2022-03-11 PROCEDURE — 6360000002 HC RX W HCPCS: Performed by: STUDENT IN AN ORGANIZED HEALTH CARE EDUCATION/TRAINING PROGRAM

## 2022-03-11 PROCEDURE — 99283 EMERGENCY DEPT VISIT LOW MDM: CPT

## 2022-03-11 PROCEDURE — 36415 COLL VENOUS BLD VENIPUNCTURE: CPT

## 2022-03-11 PROCEDURE — 85025 COMPLETE CBC W/AUTO DIFF WBC: CPT

## 2022-03-11 PROCEDURE — 84484 ASSAY OF TROPONIN QUANT: CPT

## 2022-03-11 PROCEDURE — 85378 FIBRIN DEGRADE SEMIQUANT: CPT

## 2022-03-11 PROCEDURE — 83880 ASSAY OF NATRIURETIC PEPTIDE: CPT

## 2022-03-11 PROCEDURE — 93005 ELECTROCARDIOGRAM TRACING: CPT | Performed by: PHYSICIAN ASSISTANT

## 2022-03-11 PROCEDURE — 71046 X-RAY EXAM CHEST 2 VIEWS: CPT

## 2022-03-11 PROCEDURE — 80048 BASIC METABOLIC PNL TOTAL CA: CPT

## 2022-03-11 RX ORDER — KETOROLAC TROMETHAMINE 30 MG/ML
30 INJECTION, SOLUTION INTRAMUSCULAR; INTRAVENOUS ONCE
Status: COMPLETED | OUTPATIENT
Start: 2022-03-11 | End: 2022-03-11

## 2022-03-11 RX ADMIN — KETOROLAC TROMETHAMINE 30 MG: 30 INJECTION, SOLUTION INTRAMUSCULAR; INTRAVENOUS at 17:25

## 2022-03-11 ASSESSMENT — ENCOUNTER SYMPTOMS
NAUSEA: 0
ABDOMINAL PAIN: 0
COUGH: 0
VOMITING: 0
SHORTNESS OF BREATH: 0
PHOTOPHOBIA: 0
TROUBLE SWALLOWING: 0
DIARRHEA: 0
VOICE CHANGE: 0

## 2022-03-11 ASSESSMENT — PAIN SCALES - GENERAL: PAINLEVEL_OUTOF10: 10

## 2022-03-11 ASSESSMENT — PAIN DESCRIPTION - LOCATION: LOCATION: CHEST

## 2022-03-11 ASSESSMENT — PAIN DESCRIPTION - DESCRIPTORS: DESCRIPTORS: RADIATING;SHARP

## 2022-03-11 ASSESSMENT — PAIN DESCRIPTION - ORIENTATION: ORIENTATION: LEFT

## 2022-03-11 NOTE — ED NOTES
FIRST PROVIDER CONTACT ASSESSMENT NOTE           Department of Emergency Medicine                 First Provider Note            3/11/22  3:01 PM EST    Date of Encounter: No admission date for patient encounter. Patient Name: Neto Trent  : 1970  MRN: [id number]    Chief Complaint: Chest Pain (LT SIDED /SOB / DIZZINESS)      History of Present Illness:   Neto Trent is a 46 y.o. male who presents to the ED for chest pain. Focused Physical Exam:  VS:    ED Triage Vitals [22 1437]   BP Temp Temp Source Pulse Resp SpO2 Height Weight   -- 97.7 °F (36.5 °C) Temporal 108 -- 96 % -- --        Physical Ex: Constitutional: Alert and non-toxic. Medical History:  has a past medical history of Arthritis, Asthma, Carpal tunnel syndrome, Cervical radiculopathy, Depression, Headache, Hepatitis C, History of cardiovascular stress test, Hypertension, Inguinal hernia, and TIA (transient ischemic attack). Surgical History:  has a past surgical history that includes Facial Surgery; Carpal tunnel release; Finger amputation; Tonsillectomy; and hernia repair. Social History:  reports that he quit smoking about 3 months ago. He has never used smokeless tobacco. He reports that he does not drink alcohol and does not use drugs. Family History: family history includes Cancer in his brother; Diabetes in his brother; Heart Attack in his father; Heart Disease in his brother and father. Allergies: Patient has no known allergies.      Initial Plan of Care: Initiate Treatment-Testing, Proceed toTreatment Area When Bed Available for ED Attending/MLP to Continue Care      ---END OF FIRST PROVIDER CONTACT ASSESSMENT NOTE---  Electronically signed by SUE Roca   DD: 3/11/22       SUE Roca  [id number]

## 2022-03-11 NOTE — ED NOTES
Pt present to ED with chest pain since 3am, 9/10 pain left side chest and arm. Dizziness since 3am this morning as well.      Elsa Chowdary RN  03/11/22 0556

## 2022-03-11 NOTE — ED PROVIDER NOTES
HPI   Patient is a 63-year-old male with past medical history of polysubstance use, hepatitis C, hypertension, cervical radiculopathy with DJD of the neck, bilateral carpal tunnel syndrome status post surgery and remote TIA presented to emergency department due to worsening acute chest pain. Patient localizes his chest pain to the left lower chest.  He describes it as sharp and constant with radiation down his left arm. Patient is endorsing left arm weakness and numbness as well. He states that he can only feel pressure on the left arm. Of note, patient was recently hospitalized for 3 days due to same symptoms. Patient had cardiac stress test completed at that time that was low risk and essentially unremarkable. Patient is otherwise denying any other symptoms including fever, chills, nausea, vomiting, diaphoresis, lightheadedness, headache, abdominal pain, urinary symptoms, constipation or diarrhea. He states that he is currently in facility for psychiatric care. Review of Systems   Constitutional: Negative for chills and fever. HENT: Negative for congestion, trouble swallowing and voice change. Eyes: Negative for photophobia and visual disturbance. Respiratory: Negative for cough and shortness of breath. Cardiovascular: Positive for chest pain. Negative for palpitations. Gastrointestinal: Negative for abdominal pain, diarrhea, nausea and vomiting. Genitourinary: Negative for dysuria. Musculoskeletal: Negative for arthralgias. Skin: Negative for rash and wound. Neurological: Positive for numbness. Negative for dizziness and headaches. Numbness and tingling of his left arm. Psychiatric/Behavioral: Negative for behavioral problems and confusion. Physical Exam  Constitutional:       General: He is not in acute distress. Appearance: Normal appearance. He is not ill-appearing. HENT:      Head: Normocephalic and atraumatic.       Right Ear: External ear normal. Left Ear: External ear normal.      Nose: Nose normal.      Mouth/Throat:      Mouth: Mucous membranes are moist.      Pharynx: Oropharynx is clear. Eyes:      Pupils: Pupils are equal, round, and reactive to light. Cardiovascular:      Rate and Rhythm: Normal rate and regular rhythm. Pulses: Normal pulses. Heart sounds: Normal heart sounds. Pulmonary:      Effort: Pulmonary effort is normal. No respiratory distress. Breath sounds: Normal breath sounds. No wheezing or rales. Abdominal:      General: Abdomen is flat. Palpations: Abdomen is soft. Tenderness: There is no abdominal tenderness. There is no guarding or rebound. Musculoskeletal:      Cervical back: Normal range of motion and neck supple. Right lower leg: No edema. Left lower leg: No edema. Skin:     General: Skin is warm and dry. Capillary Refill: Capillary refill takes less than 2 seconds. Neurological:      General: No focal deficit present. Mental Status: He is alert and oriented to person, place, and time. Mental status is at baseline. Cranial Nerves: No cranial nerve deficit. Sensory: No sensory deficit. Motor: No weakness. Coordination: Coordination normal.   Psychiatric:         Mood and Affect: Mood normal.         Behavior: Behavior normal.          Procedures   EKG: This EKG is signed and interpreted by me. Tachycardia with a ventricular rate of 103 bpm.  Normal axis. CT interval normal.  QTc not prolonged. No evidence of acute STEMI. No significant changes compared to previous EKG on 3/5/2022 other than faster rate. MDM   Patient is a 49-year-old male with past medical history of polysubstance use, hepatitis C, hypertension, TIA, cervical radiculopathy with DJD of the neck and bilateral carpal tunnel syndrome status post surgery presented emergency department due to acute chest pain.   Patient states that his chest pain has been ongoing for several days but worsened today. He is also endorsing left arm numbness and tingling. Of note, patient had recent hospitalizations and full cardiac work-up. Stress test done was unremarkable. Initial vitals with mild tachycardia at 108 but improved on repeat. Work-up in the emergency department unremarkable. Troponin and dimer negative. CBC with no evidence of leukocytosis or anemia. BMP with no major electrolyte abnormalities. Renal function stable. Patient given IV Toradol in the emergency department with some relief in his symptoms. Work-up and results were discussed with the patient and he was agreeable to discharge with outpatient follow-up as needed. He understands that there is no indication for acute medical intervention or need for admission at this time. Work-up suggests that his symptoms are likely not cardiac or pulmonary in nature. He remained stable in the ED for discharge. ED Course as of 03/12/22 1012   Fri Mar 11, 2022   1600   ATTENDING PROVIDER ATTESTATION:     I have personally performed and/or participated in the history, exam, medical decision making, and procedures and agree with all pertinent clinical information unless otherwise noted. I have also reviewed and agree with the past medical, family and social history unless otherwise noted. I have discussed this patient in detail with the resident and provided the instruction and education regarding the evidence-based evaluation and treatment of chest pain. History: patient describes it as a pressure with paresthesia of the left upper arm and hand. Forearm \"feels normal\". He denies fever, chills, n/v, diaphoresis. He states he feels SOB when wearing a mask. No peripheral edema. My findings: Denise Quinonez is a 46 y.o. male whom is in no distress. Physical exam reveals heart RRR, lungs CTA, anterior left chest wall is tender to palpation. No peripheral edema or tenderness. My plan: Symptomatic and supportive care.  Patient had stress test at previous admission. Will reevaluate labs and EKG. Electronically signed by Hermila Antonio DO on 3/11/22 at 4:00 PM EST       [JS]   2170 Patient reevaluated. He states that he is not having any chest pain but feeling mild pain in the back of his neck. Work-up and results were discussed with the patient and he understands that his symptoms are there is no cardiopulmonary process causing his symptoms. Patient was agreeable to discharge with outpatient PCP follow-up. Will give patient Toradol to help with his symptoms before discharge. [PP]      ED Course User Index  [JS] Hermila Antonio DO  [PP] Tushar Carson DO        --------------------------------------------- PAST HISTORY ---------------------------------------------  Past Medical History:  has a past medical history of Arthritis, Asthma, Carpal tunnel syndrome, Cervical radiculopathy, Depression, Headache, Hepatitis C, History of cardiovascular stress test, Hypertension, Inguinal hernia, and TIA (transient ischemic attack). Past Surgical History:  has a past surgical history that includes Facial Surgery; Carpal tunnel release; Finger amputation; Tonsillectomy; and hernia repair. Social History:  reports that he quit smoking about 3 months ago. He has never used smokeless tobacco. He reports that he does not drink alcohol and does not use drugs. Family History: family history includes Cancer in his brother; Diabetes in his brother; Heart Attack in his father; Heart Disease in his brother and father. The patients home medications have been reviewed. Allergies: Patient has no known allergies.     -------------------------------------------------- RESULTS -------------------------------------------------  Labs:  Results for orders placed or performed during the hospital encounter of 03/11/22   CBC with Auto Differential   Result Value Ref Range    WBC 7.8 4.5 - 11.5 E9/L    RBC 4.99 3.80 - 5.80 E12/L Hemoglobin 16.2 12.5 - 16.5 g/dL    Hematocrit 44.5 37.0 - 54.0 %    MCV 89.2 80.0 - 99.9 fL    MCH 32.5 26.0 - 35.0 pg    MCHC 36.4 (H) 32.0 - 34.5 %    RDW 12.4 11.5 - 15.0 fL    Platelets 715 879 - 464 E9/L    MPV 10.0 7.0 - 12.0 fL    Neutrophils % 63.9 43.0 - 80.0 %    Immature Granulocytes % 0.3 0.0 - 5.0 %    Lymphocytes % 23.0 20.0 - 42.0 %    Monocytes % 9.8 2.0 - 12.0 %    Eosinophils % 2.0 0.0 - 6.0 %    Basophils % 1.0 0.0 - 2.0 %    Neutrophils Absolute 5.01 1.80 - 7.30 E9/L    Immature Granulocytes # 0.02 E9/L    Lymphocytes Absolute 1.80 1.50 - 4.00 E9/L    Monocytes Absolute 0.77 0.10 - 0.95 E9/L    Eosinophils Absolute 0.16 0.05 - 0.50 E9/L    Basophils Absolute 0.08 0.00 - 0.20 R2/P   Basic Metabolic Panel   Result Value Ref Range    Sodium 138 132 - 146 mmol/L    Potassium 4.1 3.5 - 5.0 mmol/L    Chloride 102 98 - 107 mmol/L    CO2 24 22 - 29 mmol/L    Anion Gap 12 7 - 16 mmol/L    Glucose 135 (H) 74 - 99 mg/dL    BUN 15 6 - 20 mg/dL    CREATININE 0.8 0.7 - 1.2 mg/dL    GFR Non-African American >60 >=60 mL/min/1.73    GFR African American >60     Calcium 9.3 8.6 - 10.2 mg/dL   Troponin   Result Value Ref Range    Troponin, High Sensitivity 8 0 - 11 ng/L   Brain Natriuretic Peptide   Result Value Ref Range    Pro-BNP <5 0 - 125 pg/mL   D-Dimer, Quantitative   Result Value Ref Range    D-Dimer, Quant <200 ng/mL DDU   EKG 12 Lead   Result Value Ref Range    Ventricular Rate 103 BPM    Atrial Rate 103 BPM    P-R Interval 136 ms    QRS Duration 82 ms    Q-T Interval 336 ms    QTc Calculation (Bazett) 440 ms    P Axis 82 degrees    R Axis 83 degrees    T Axis 74 degrees       Radiology:  XR CHEST (2 VW)   Final Result   No acute process. ------------------------- NURSING NOTES AND VITALS REVIEWED ---------------------------  Date / Time Roomed:  3/11/2022  3:24 PM  ED Bed Assignment:  10/10    The nursing notes within the ED encounter and vital signs as below have been reviewed.    BP (!) 139/91   Pulse 100   Temp 97.7 °F (36.5 °C) (Temporal)   Resp 18   SpO2 94%   Oxygen Saturation Interpretation: Normal      ------------------------------------------ PROGRESS NOTES ------------------------------------------  I have spoken with the patient and discussed todays results, in addition to providing specific details for the plan of care and counseling regarding the diagnosis and prognosis. Their questions are answered at this time and they are agreeable with the plan. I discussed at length with them reasons for immediate return here for re evaluation. They will followup with primary care by calling their office tomorrow. --------------------------------- ADDITIONAL PROVIDER NOTES ---------------------------------  At this time the patient is without objective evidence of an acute process requiring hospitalization or inpatient management. They have remained hemodynamically stable throughout their entire ED visit and are stable for discharge with outpatient follow-up. The plan has been discussed in detail and they are aware of the specific conditions for emergent return, as well as the importance of follow-up. Discharge Medication List as of 3/11/2022  5:30 PM          Diagnosis:  1. Chest pain, unspecified type        Disposition:  Patient's disposition: Discharge to home  Patient's condition is stable.          Verena Martinez DO  Resident  03/12/22 1020

## 2022-04-08 ENCOUNTER — HOSPITAL ENCOUNTER (OUTPATIENT)
Dept: GENERAL RADIOLOGY | Age: 52
Discharge: HOME | End: 2022-04-10
Payer: MEDICARE

## 2022-04-08 ENCOUNTER — HOSPITAL ENCOUNTER (OUTPATIENT)
Age: 52
Discharge: HOME | End: 2022-04-10
Payer: MEDICARE

## 2022-04-08 DIAGNOSIS — M54.12 CERVICAL RADICULOPATHY: ICD-10-CM

## 2022-04-08 PROCEDURE — 72050 X-RAY EXAM NECK SPINE 4/5VWS: CPT

## 2022-05-15 ENCOUNTER — APPOINTMENT (OUTPATIENT)
Dept: GENERAL RADIOLOGY | Age: 52
End: 2022-05-15
Payer: MEDICARE

## 2022-05-15 ENCOUNTER — APPOINTMENT (OUTPATIENT)
Dept: CT IMAGING | Age: 52
End: 2022-05-15
Payer: MEDICARE

## 2022-05-15 ENCOUNTER — HOSPITAL ENCOUNTER (EMERGENCY)
Age: 52
Discharge: HOME | End: 2022-05-15
Attending: EMERGENCY MEDICINE
Payer: MEDICARE

## 2022-05-15 VITALS
OXYGEN SATURATION: 95 % | RESPIRATION RATE: 18 BRPM | BODY MASS INDEX: 26.5 KG/M2 | TEMPERATURE: 97.9 F | DIASTOLIC BLOOD PRESSURE: 73 MMHG | SYSTOLIC BLOOD PRESSURE: 119 MMHG | WEIGHT: 190 LBS | HEART RATE: 99 BPM

## 2022-05-15 DIAGNOSIS — R42 DIZZINESS: ICD-10-CM

## 2022-05-15 DIAGNOSIS — R55 SYNCOPE AND COLLAPSE: Primary | ICD-10-CM

## 2022-05-15 DIAGNOSIS — R20.0 NUMBNESS AND TINGLING OF LEFT UPPER AND LOWER EXTREMITY: ICD-10-CM

## 2022-05-15 DIAGNOSIS — R20.2 NUMBNESS AND TINGLING OF LEFT UPPER AND LOWER EXTREMITY: ICD-10-CM

## 2022-05-15 DIAGNOSIS — R79.89 ELEVATED D-DIMER: ICD-10-CM

## 2022-05-15 DIAGNOSIS — E87.6 HYPOKALEMIA: ICD-10-CM

## 2022-05-15 DIAGNOSIS — R07.89 CHEST WALL PAIN: ICD-10-CM

## 2022-05-15 LAB
ALBUMIN SERPL-MCNC: 4.3 G/DL (ref 3.5–5.2)
ALP BLD-CCNC: 113 U/L (ref 40–129)
ALT SERPL-CCNC: 73 U/L (ref 0–40)
ANION GAP SERPL CALCULATED.3IONS-SCNC: 14 MMOL/L (ref 7–16)
APTT: 29.1 SEC (ref 24.5–35.1)
AST SERPL-CCNC: 28 U/L (ref 0–39)
BASOPHILS ABSOLUTE: 0.07 E9/L (ref 0–0.2)
BASOPHILS RELATIVE PERCENT: 0.8 % (ref 0–2)
BILIRUB SERPL-MCNC: 0.4 MG/DL (ref 0–1.2)
BUN BLDV-MCNC: 19 MG/DL (ref 6–20)
CALCIUM SERPL-MCNC: 8.8 MG/DL (ref 8.6–10.2)
CHLORIDE BLD-SCNC: 102 MMOL/L (ref 98–107)
CO2: 22 MMOL/L (ref 22–29)
CREAT SERPL-MCNC: 0.8 MG/DL (ref 0.7–1.2)
D DIMER: 584 NG/ML DDU
EOSINOPHILS ABSOLUTE: 0.3 E9/L (ref 0.05–0.5)
EOSINOPHILS RELATIVE PERCENT: 3.5 % (ref 0–6)
GFR AFRICAN AMERICAN: >60
GFR NON-AFRICAN AMERICAN: >60 ML/MIN/1.73
GLUCOSE BLD-MCNC: 168 MG/DL (ref 74–99)
HCT VFR BLD CALC: 45.9 % (ref 37–54)
HEMOGLOBIN: 16 G/DL (ref 12.5–16.5)
IMMATURE GRANULOCYTES #: 0.01 E9/L
IMMATURE GRANULOCYTES %: 0.1 % (ref 0–5)
INR BLD: 1
LYMPHOCYTES ABSOLUTE: 2.9 E9/L (ref 1.5–4)
LYMPHOCYTES RELATIVE PERCENT: 33.4 % (ref 20–42)
MAGNESIUM: 1.9 MG/DL (ref 1.6–2.6)
MCH RBC QN AUTO: 31.7 PG (ref 26–35)
MCHC RBC AUTO-ENTMCNC: 34.9 % (ref 32–34.5)
MCV RBC AUTO: 90.9 FL (ref 80–99.9)
MONOCYTES ABSOLUTE: 0.89 E9/L (ref 0.1–0.95)
MONOCYTES RELATIVE PERCENT: 10.2 % (ref 2–12)
NEUTROPHILS ABSOLUTE: 4.52 E9/L (ref 1.8–7.3)
NEUTROPHILS RELATIVE PERCENT: 52 % (ref 43–80)
PDW BLD-RTO: 12.3 FL (ref 11.5–15)
PLATELET # BLD: 219 E9/L (ref 130–450)
PMV BLD AUTO: 10.1 FL (ref 7–12)
POTASSIUM REFLEX MAGNESIUM: 3 MMOL/L (ref 3.5–5)
PROTHROMBIN TIME: 11.4 SEC (ref 9.3–12.4)
RBC # BLD: 5.05 E12/L (ref 3.8–5.8)
SODIUM BLD-SCNC: 138 MMOL/L (ref 132–146)
TOTAL PROTEIN: 7.1 G/DL (ref 6.4–8.3)
TROPONIN, HIGH SENSITIVITY: <6 NG/L (ref 0–11)
WBC # BLD: 8.7 E9/L (ref 4.5–11.5)

## 2022-05-15 PROCEDURE — 85025 COMPLETE CBC W/AUTO DIFF WBC: CPT

## 2022-05-15 PROCEDURE — 6370000000 HC RX 637 (ALT 250 FOR IP): Performed by: STUDENT IN AN ORGANIZED HEALTH CARE EDUCATION/TRAINING PROGRAM

## 2022-05-15 PROCEDURE — 70498 CT ANGIOGRAPHY NECK: CPT

## 2022-05-15 PROCEDURE — 70450 CT HEAD/BRAIN W/O DYE: CPT

## 2022-05-15 PROCEDURE — 83735 ASSAY OF MAGNESIUM: CPT

## 2022-05-15 PROCEDURE — 6360000004 HC RX CONTRAST MEDICATION: Performed by: RADIOLOGY

## 2022-05-15 PROCEDURE — 99285 EMERGENCY DEPT VISIT HI MDM: CPT

## 2022-05-15 PROCEDURE — 80053 COMPREHEN METABOLIC PANEL: CPT

## 2022-05-15 PROCEDURE — 71045 X-RAY EXAM CHEST 1 VIEW: CPT

## 2022-05-15 PROCEDURE — 85378 FIBRIN DEGRADE SEMIQUANT: CPT

## 2022-05-15 PROCEDURE — 2580000003 HC RX 258: Performed by: STUDENT IN AN ORGANIZED HEALTH CARE EDUCATION/TRAINING PROGRAM

## 2022-05-15 PROCEDURE — 0042T CT BRAIN PERFUSION: CPT

## 2022-05-15 PROCEDURE — 71275 CT ANGIOGRAPHY CHEST: CPT

## 2022-05-15 PROCEDURE — 70496 CT ANGIOGRAPHY HEAD: CPT

## 2022-05-15 PROCEDURE — 93005 ELECTROCARDIOGRAM TRACING: CPT | Performed by: STUDENT IN AN ORGANIZED HEALTH CARE EDUCATION/TRAINING PROGRAM

## 2022-05-15 PROCEDURE — 84484 ASSAY OF TROPONIN QUANT: CPT

## 2022-05-15 PROCEDURE — 85730 THROMBOPLASTIN TIME PARTIAL: CPT

## 2022-05-15 PROCEDURE — 85610 PROTHROMBIN TIME: CPT

## 2022-05-15 RX ORDER — 0.9 % SODIUM CHLORIDE 0.9 %
1000 INTRAVENOUS SOLUTION INTRAVENOUS ONCE
Status: COMPLETED | OUTPATIENT
Start: 2022-05-15 | End: 2022-05-15

## 2022-05-15 RX ORDER — POTASSIUM CHLORIDE 20 MEQ/1
40 TABLET, EXTENDED RELEASE ORAL ONCE
Status: COMPLETED | OUTPATIENT
Start: 2022-05-15 | End: 2022-05-15

## 2022-05-15 RX ADMIN — IOPAMIDOL 150 ML: 755 INJECTION, SOLUTION INTRAVENOUS at 16:16

## 2022-05-15 RX ADMIN — SODIUM CHLORIDE 1000 ML: 9 INJECTION, SOLUTION INTRAVENOUS at 15:37

## 2022-05-15 RX ADMIN — POTASSIUM CHLORIDE 40 MEQ: 20 TABLET, EXTENDED RELEASE ORAL at 16:29

## 2022-05-15 NOTE — ED PROVIDER NOTES
HPI:  5/15/22, Time: 3:04 PM EDT   Chief complaint: Chest pain    Tracy Rodriguez is a 46 y.o. male presenting to the ED from a behavioral health facility for hypertension, syncope with collapse, chest pain, and numbness. The complaint has been intermittent, moderate in severity, and worsened by standing. Patient states his blood pressure was in the 200s this morning. He states he has a history of numbness on his left side from a previous stroke, however the symptoms have acutely worsened today at approximately 1030. Chest pain is on the left side with radiation to the left arm. He reports generalized weakness and difficulty ambulating. Patient denies fever/chills, sore throat, cough, congestion, shortness of breath, edema, abdominal pain, nausea, vomiting, diarrhea, constipation, dysuria, hematuria, trauma, back pain, rash or other complaints. ROS:   A complete review of systems was performed and all pertinent positives and negatives are stated within HPI, all other systems reviewed and are negative.            --------------------------------------------- PAST HISTORY ---------------------------------------------  Past Medical History:  has a past medical history of Arthritis, Asthma, Carpal tunnel syndrome, Cervical radiculopathy, Depression, Headache, Hepatitis C, History of cardiovascular stress test, Hypertension, Inguinal hernia, and TIA (transient ischemic attack). Past Surgical History:  has a past surgical history that includes Facial Surgery; Carpal tunnel release; Finger amputation; Tonsillectomy; and hernia repair. Social History:  reports that he quit smoking about 5 months ago. He has never used smokeless tobacco. He reports that he does not drink alcohol and does not use drugs. Family History: family history includes Cancer in his brother; Diabetes in his brother; Heart Attack in his father; Heart Disease in his brother and father.      The patients home medications have been reviewed. Allergies: Patient has no known allergies. ----------------------------------------PHYSICAL EXAM--------------------------------------    Constitutional:  Well developed, well nourished, anxious with an upper extremity tremor, non-toxic appearance   Eyes:  PERRL, conjunctiva normal, EOMI  HENT:  Atraumatic, external ears normal, nose normal, oropharynx moist, no pharyngeal exudates. Neck- normal range of motion, no nuchal rigidity   Respiratory:  No respiratory distress, normal breath sounds, no rales, no wheezing   Cardiovascular:   Tachycardic, normal rhythm, no murmurs. Radial and DP pulses 2+ bilaterally. GI:  Soft, nondistended, nontender, no organomegaly, no mass, no rebound, no guarding   Musculoskeletal:  No edema, no tenderness, no deformities. Back- no tenderness  Integument:  Well hydrated, no rash. Adequate perfusion. Lymphatic:  No cervical lymphadenopathy noted   Neurologic:  Alert & oriented x 3, diminished gross sensation of the left-sided face, upper extremity, and lower extremity, muscle strength slightly diminished on the left side in the upper and lower extremity, positive Romberg, tremulous  psychiatric:  Speech and behavior appropriate     NIH Stroke Scale/Score at time of initial evaluation:  1A: Level of Consciousness 0 - alert; keenly responsive   1B: Ask Month and Age 0 - answers both questions correctly   1C:  Tell Patient To Open and Close Eyes, then Hand  Squeeze 0 - performs both tasks correctly   2: Test Horizontal Extraocular Movements 0 - normal   3: Test Visual Fields 0 - no visual loss   4: Test Facial Palsy 0 - normal symmetric movement   5A: Test Left Arm Motor Drift 1 - drift, limb holds 90 (or 45) degrees but drifts down before full 10 seconds: does not hit bed   5B: Test Right Arm Motor Drift 0 - no drift, limb holds 90 (or 45) degrees for full 10 seconds   6A: Test Left Leg Motor Drift 0 - no drift; leg holds 30 degree position for full 5 seconds 6B: Test Right Leg Motor Drift 0 - no drift; leg holds 30 degree position for full 5 seconds   7: Test Limb Ataxia   (FNF/Heel-Shin) 0 - absent   8: Test Sensation 2 - severe to total sensory loss; patient is not aware of being touched in face, arm, leg   9: Test Language/Aphasia 0 - no aphasia, normal   10: Test Dysarthria 0 - normal   11: Test Extinction/Inattention 0 - no abnormality   Total 3       -------------------------------------------------- RESULTS -------------------------------------------------  I have personally reviewed all laboratory and imaging results for this patient. Results are listed below.      LABS:  Results for orders placed or performed during the hospital encounter of 05/15/22   CBC with Auto Differential   Result Value Ref Range    WBC 8.7 4.5 - 11.5 E9/L    RBC 5.05 3.80 - 5.80 E12/L    Hemoglobin 16.0 12.5 - 16.5 g/dL    Hematocrit 45.9 37.0 - 54.0 %    MCV 90.9 80.0 - 99.9 fL    MCH 31.7 26.0 - 35.0 pg    MCHC 34.9 (H) 32.0 - 34.5 %    RDW 12.3 11.5 - 15.0 fL    Platelets 066 175 - 329 E9/L    MPV 10.1 7.0 - 12.0 fL    Neutrophils % 52.0 43.0 - 80.0 %    Immature Granulocytes % 0.1 0.0 - 5.0 %    Lymphocytes % 33.4 20.0 - 42.0 %    Monocytes % 10.2 2.0 - 12.0 %    Eosinophils % 3.5 0.0 - 6.0 %    Basophils % 0.8 0.0 - 2.0 %    Neutrophils Absolute 4.52 1.80 - 7.30 E9/L    Immature Granulocytes # 0.01 E9/L    Lymphocytes Absolute 2.90 1.50 - 4.00 E9/L    Monocytes Absolute 0.89 0.10 - 0.95 E9/L    Eosinophils Absolute 0.30 0.05 - 0.50 E9/L    Basophils Absolute 0.07 0.00 - 0.20 E9/L   Comprehensive Metabolic Panel w/ Reflex to MG   Result Value Ref Range    Sodium 138 132 - 146 mmol/L    Potassium reflex Magnesium 3.0 (L) 3.5 - 5.0 mmol/L    Chloride 102 98 - 107 mmol/L    CO2 22 22 - 29 mmol/L    Anion Gap 14 7 - 16 mmol/L    Glucose 168 (H) 74 - 99 mg/dL    BUN 19 6 - 20 mg/dL    CREATININE 0.8 0.7 - 1.2 mg/dL    GFR Non-African American >60 >=60 mL/min/1.73    GFR African American >60     Calcium 8.8 8.6 - 10.2 mg/dL    Total Protein 7.1 6.4 - 8.3 g/dL    Albumin 4.3 3.5 - 5.2 g/dL    Total Bilirubin 0.4 0.0 - 1.2 mg/dL    Alkaline Phosphatase 113 40 - 129 U/L    ALT 73 (H) 0 - 40 U/L    AST 28 0 - 39 U/L   Troponin   Result Value Ref Range    Troponin, High Sensitivity <6 0 - 11 ng/L   D-Dimer, Quantitative   Result Value Ref Range    D-Dimer, Quant 584 ng/mL DDU   Protime-INR   Result Value Ref Range    Protime 11.4 9.3 - 12.4 sec    INR 1.0    APTT   Result Value Ref Range    aPTT 29.1 24.5 - 35.1 sec   Magnesium   Result Value Ref Range    Magnesium 1.9 1.6 - 2.6 mg/dL   EKG 12 Lead   Result Value Ref Range    Ventricular Rate 111 BPM    Atrial Rate 111 BPM    P-R Interval 144 ms    QRS Duration 94 ms    Q-T Interval 382 ms    QTc Calculation (Bazett) 519 ms    P Axis 39 degrees    R Axis 54 degrees    T Axis 41 degrees       RADIOLOGY:  Interpreted by Radiologist.  XR CHEST 1 VIEW   Final Result   No acute cardiopulmonary process. CT Head WO Contrast   Final Result   No CT evidence of an acute infarct. No flow limiting stenosis or large vessel occlusion detected within the head   or neck. No perfusion mismatches visualized. This scan was analyzed using [employer]. Identification of suspected   findings is not for diagnostic use beyond notification. Viz LVO is limited to   analysis of imaging data and should not be used in-lieu of full patient   evaluation or relied upon to make or confirm diagnosis. The findings were sent to the Radiology Results [address] at 5:03   pm on 5/15/2022 to be communicated to a licensed caregiver. CT BRAIN PERFUSION   Final Result   No CT evidence of an acute infarct. No flow limiting stenosis or large vessel occlusion detected within the head   or neck. No perfusion mismatches visualized. This scan was analyzed using [employer].  Identification of suspected   findings is not for diagnostic use beyond notification. Viz LVO is limited to   analysis of imaging data and should not be used in-lieu of full patient   evaluation or relied upon to make or confirm diagnosis. The findings were sent to the Radiology Results [address] at 5:03   pm on 5/15/2022 to be communicated to a licensed caregiver. CTA NECK W CONTRAST   Final Result   No CT evidence of an acute infarct. No flow limiting stenosis or large vessel occlusion detected within the head   or neck. No perfusion mismatches visualized. This scan was analyzed using [employer]. vzaar contact LVO. Identification of suspected   findings is not for diagnostic use beyond notification. Viz LVO is limited to   analysis of imaging data and should not be used in-lieu of full patient   evaluation or relied upon to make or confirm diagnosis. The findings were sent to the Radiology Results [address] at 5:03   pm on 5/15/2022 to be communicated to a licensed caregiver. CTA HEAD W CONTRAST   Final Result   No CT evidence of an acute infarct. No flow limiting stenosis or large vessel occlusion detected within the head   or neck. No perfusion mismatches visualized. This scan was analyzed using [employer]. vzaar contact LVO. Identification of suspected   findings is not for diagnostic use beyond notification. Viz LVO is limited to   analysis of imaging data and should not be used in-lieu of full patient   evaluation or relied upon to make or confirm diagnosis. The findings were sent to the Radiology Results [address] at 5:03   pm on 5/15/2022 to be communicated to a licensed caregiver. CTA PULMONARY W CONTRAST   Final Result   1. The study is nondiagnostic for a pulmonary embolism due to poor contrast   enhancement the.  Repeat CTA or V/Q scan may be considered. 2. Clear lungs.       RECOMMENDATIONS:   Unavailable                 ------------------------- NURSING NOTES AND VITALS REVIEWED ---------------------------  The nursing notes within the ED encounter and vital signs as below have been reviewed by myself. /73   Pulse 99   Temp 97.9 °F (36.6 °C)   Resp 18   Wt 190 lb (86.2 kg)   SpO2 95%   BMI 26.50 kg/m²   Oxygen Saturation Interpretation: Normal      The patients available past medical records and past encounters were reviewed. ------------------------------ ED COURSE/MEDICAL DECISION MAKING----------------------  Medications   0.9 % sodium chloride bolus (0 mLs IntraVENous Stopped 5/15/22 1752)   potassium chloride (KLOR-CON M) extended release tablet 40 mEq (40 mEq Oral Given 5/15/22 1629)   iopamidol (ISOVUE-370) 76 % injection 150 mL (150 mLs IntraVENous Given 5/15/22 1616)     ED Course as of 05/15/22 2315   Sun May 15, 2022   1533   ATTENDING PROVIDER ATTESTATION:     I have personally performed and/or participated in the history, exam, medical decision making, and procedures and agree with all pertinent clinical information unless otherwise noted. I have also reviewed and agree with the past medical, family and social history unless otherwise noted. I have discussed this patient in detail with the resident and provided the instruction and education regarding the evidence-based evaluation and treatment of worsening weakness of the left face, UE and LE. History: He states he is at an inpatient mental health facility and developed a headache and elevated blood pressure. He has history of stroke and is no longer on Plavix. My findings: Irlanda Cummins is a 46 y.o. male whom is in no distress. Physical exam reveals anxious with essential tremor appreciated. He has decreased sensation and weakness of the left extremities. My plan: Symptomatic and supportive care. Will evaluate with CT and perfusion scans.     Electronically signed by Dave Ahumada, DO on 5/15/22 at 3:33 PM EDT       [JS]      ED Course User Index  [JS] Matt Keenan Demetrio Fermin DO       MEDICATIONS  Discharge Medication List as of 5/15/2022  6:24 PM            Medical Decision Making:    Patient is a 80-year-old male presenting with syncope, chest pain, and numbness. Patient was initially tachycardic on arrival.  Blood work was significant for an elevated dimer and mild hypokalemia. His potassium was repleted. CTA did not show pulmonary embolism. Cardiac work-up was unremarkable. Patient had an NIH scale of 3, however he was out of the window for code stroke. Stroke work-up was unremarkable. Results were discussed with the patient who was agreeable with the plan to be discharged. This patient's ED course included: re-evaluation prior to disposition, multiple bedside re-evaluations and a personal history and physicial eaxmination    This patient has remained hemodynamically stable during their ED course. Counseling: The emergency provider has spoken with the patient and discussed todays results, in addition to providing specific details for the plan of care and counseling regarding the diagnosis and prognosis. Questions are answered at this time and they are agreeable with the plan.    --------------------------- IMPRESSION AND DISPOSITION ---------------------------------    IMPRESSION  1. Hypokalemia    2. Elevated d-dimer    3. Syncope and collapse    4. Dizziness    5.  Numbness and tingling of left upper and lower extremity        DISPOSITION  Disposition: Discharge to home  Patient condition is stable           Rosas Eli DO  Resident  05/15/22 6302

## 2022-05-16 LAB
EKG ATRIAL RATE: 111 BPM
EKG P AXIS: 39 DEGREES
EKG P-R INTERVAL: 144 MS
EKG Q-T INTERVAL: 382 MS
EKG QRS DURATION: 94 MS
EKG QTC CALCULATION (BAZETT): 519 MS
EKG R AXIS: 54 DEGREES
EKG T AXIS: 41 DEGREES
EKG VENTRICULAR RATE: 111 BPM

## 2022-05-16 PROCEDURE — 93010 ELECTROCARDIOGRAM REPORT: CPT | Performed by: INTERNAL MEDICINE

## 2022-05-26 ENCOUNTER — HOSPITAL ENCOUNTER (OUTPATIENT)
Age: 52
Discharge: HOME | End: 2022-05-26
Payer: MEDICARE

## 2022-05-26 LAB
ALBUMIN SERPL-MCNC: 4.4 G/DL (ref 3.5–5.2)
ALP BLD-CCNC: 103 U/L (ref 40–129)
ALT SERPL-CCNC: 33 U/L (ref 0–40)
ANION GAP SERPL CALCULATED.3IONS-SCNC: 9 MMOL/L (ref 7–16)
AST SERPL-CCNC: 22 U/L (ref 0–39)
BASOPHILS ABSOLUTE: 0.07 E9/L (ref 0–0.2)
BASOPHILS RELATIVE PERCENT: 1.1 % (ref 0–2)
BILIRUB SERPL-MCNC: 0.6 MG/DL (ref 0–1.2)
BUN BLDV-MCNC: 16 MG/DL (ref 6–20)
CALCIUM SERPL-MCNC: 9.1 MG/DL (ref 8.6–10.2)
CHLORIDE BLD-SCNC: 103 MMOL/L (ref 98–107)
CHOLESTEROL, FASTING: 119 MG/DL (ref 0–199)
CO2: 26 MMOL/L (ref 22–29)
CREAT SERPL-MCNC: 0.7 MG/DL (ref 0.7–1.2)
EOSINOPHILS ABSOLUTE: 0.37 E9/L (ref 0.05–0.5)
EOSINOPHILS RELATIVE PERCENT: 5.8 % (ref 0–6)
GFR AFRICAN AMERICAN: >60
GFR NON-AFRICAN AMERICAN: >60 ML/MIN/1.73
GLUCOSE FASTING: 96 MG/DL (ref 74–99)
HCT VFR BLD CALC: 48.3 % (ref 37–54)
HDLC SERPL-MCNC: 44 MG/DL
HEMOGLOBIN: 16.6 G/DL (ref 12.5–16.5)
IMMATURE GRANULOCYTES #: 0.02 E9/L
IMMATURE GRANULOCYTES %: 0.3 % (ref 0–5)
LDL CHOLESTEROL CALCULATED: 62 MG/DL (ref 0–99)
LYMPHOCYTES ABSOLUTE: 2.14 E9/L (ref 1.5–4)
LYMPHOCYTES RELATIVE PERCENT: 33.7 % (ref 20–42)
MCH RBC QN AUTO: 31.4 PG (ref 26–35)
MCHC RBC AUTO-ENTMCNC: 34.4 % (ref 32–34.5)
MCV RBC AUTO: 91.3 FL (ref 80–99.9)
MONOCYTES ABSOLUTE: 0.72 E9/L (ref 0.1–0.95)
MONOCYTES RELATIVE PERCENT: 11.3 % (ref 2–12)
NEUTROPHILS ABSOLUTE: 3.03 E9/L (ref 1.8–7.3)
NEUTROPHILS RELATIVE PERCENT: 47.8 % (ref 43–80)
PDW BLD-RTO: 12.3 FL (ref 11.5–15)
PLATELET # BLD: 228 E9/L (ref 130–450)
PMV BLD AUTO: 10.3 FL (ref 7–12)
POTASSIUM SERPL-SCNC: 4.7 MMOL/L (ref 3.5–5)
RBC # BLD: 5.29 E12/L (ref 3.8–5.8)
SODIUM BLD-SCNC: 138 MMOL/L (ref 132–146)
TOTAL PROTEIN: 7.1 G/DL (ref 6.4–8.3)
TRIGLYCERIDE, FASTING: 65 MG/DL (ref 0–149)
VLDLC SERPL CALC-MCNC: 13 MG/DL
WBC # BLD: 6.4 E9/L (ref 4.5–11.5)

## 2022-05-26 PROCEDURE — 80061 LIPID PANEL: CPT

## 2022-05-26 PROCEDURE — 36415 COLL VENOUS BLD VENIPUNCTURE: CPT

## 2022-05-26 PROCEDURE — 85025 COMPLETE CBC W/AUTO DIFF WBC: CPT

## 2022-05-26 PROCEDURE — 80053 COMPREHEN METABOLIC PANEL: CPT

## 2023-02-03 PROBLEM — T63.301A SPIDER BITE: Status: ACTIVE | Noted: 2023-02-03

## 2023-02-03 PROBLEM — F41.9 ANXIETY: Status: ACTIVE | Noted: 2023-02-03

## 2023-02-03 PROBLEM — J45.909 ASTHMA (HHS-HCC): Status: ACTIVE | Noted: 2023-02-03

## 2023-02-03 PROBLEM — K21.9 GERD (GASTROESOPHAGEAL REFLUX DISEASE): Status: ACTIVE | Noted: 2023-02-03

## 2023-02-03 PROBLEM — R79.89 ELEVATED LIVER FUNCTION TESTS: Status: ACTIVE | Noted: 2023-02-03

## 2023-02-03 PROBLEM — K40.90 INGUINAL HERNIA: Status: ACTIVE | Noted: 2023-02-03

## 2023-02-03 PROBLEM — R41.0 CONFUSION: Status: ACTIVE | Noted: 2023-02-03

## 2023-02-03 PROBLEM — R10.9 ABDOMINAL PAIN: Status: ACTIVE | Noted: 2023-02-03

## 2023-02-03 PROBLEM — B19.20 HEPATITIS C: Status: ACTIVE | Noted: 2023-02-03

## 2023-02-03 PROBLEM — I10 HTN (HYPERTENSION): Status: ACTIVE | Noted: 2023-02-03

## 2023-02-03 PROBLEM — M79.18 MYOFASCIAL PAIN: Status: ACTIVE | Noted: 2023-02-03

## 2023-02-03 PROBLEM — M79.645 PAIN OF LEFT THUMB: Status: ACTIVE | Noted: 2023-02-03

## 2023-02-03 PROBLEM — R76.8 HCV ANTIBODY POSITIVE: Status: ACTIVE | Noted: 2023-02-03

## 2023-02-03 PROBLEM — G56.42 COMPLEX REGIONAL PAIN SYNDROME TYPE 2 OF LEFT UPPER EXTREMITY: Status: ACTIVE | Noted: 2023-02-03

## 2023-02-03 PROBLEM — M54.12 CERVICAL RADICULOPATHY, CHRONIC: Status: ACTIVE | Noted: 2023-02-03

## 2023-02-03 PROBLEM — M53.3 SACROILIAC JOINT DYSFUNCTION OF BOTH SIDES: Status: ACTIVE | Noted: 2023-02-03

## 2023-02-03 PROBLEM — G47.00 INSOMNIA: Status: ACTIVE | Noted: 2023-02-03

## 2023-02-03 PROBLEM — F32.A DEPRESSION: Status: ACTIVE | Noted: 2023-02-03

## 2023-02-03 PROBLEM — R60.9 EDEMA: Status: ACTIVE | Noted: 2023-02-03

## 2023-02-03 PROBLEM — R63.4 WEIGHT LOSS, UNINTENTIONAL: Status: ACTIVE | Noted: 2023-02-03

## 2023-02-03 PROBLEM — M79.644 PAIN OF RIGHT THUMB: Status: ACTIVE | Noted: 2023-02-03

## 2023-02-03 PROBLEM — G54.2 CERVICAL NEUROPATHY: Status: ACTIVE | Noted: 2023-02-03

## 2023-02-03 PROBLEM — R53.83 FATIGUE: Status: ACTIVE | Noted: 2023-02-03

## 2023-02-03 PROBLEM — M25.519 SHOULDER PAIN: Status: ACTIVE | Noted: 2023-02-03

## 2023-02-03 PROBLEM — G43.909 MIGRAINE: Status: ACTIVE | Noted: 2023-02-03

## 2023-02-03 PROBLEM — J32.9 SINUSITIS: Status: ACTIVE | Noted: 2023-02-03

## 2023-02-03 PROBLEM — E55.9 VITAMIN D DEFICIENCY: Status: ACTIVE | Noted: 2023-02-03

## 2023-02-03 PROBLEM — E66.2 CLASS 2 OBESITY WITH ALVEOLAR HYPOVENTILATION AND BODY MASS INDEX (BMI) OF 36.0 TO 36.9 IN ADULT (MULTI): Status: ACTIVE | Noted: 2023-02-03

## 2023-02-03 PROBLEM — M47.817 LUMBOSACRAL SPONDYLOSIS: Status: ACTIVE | Noted: 2023-02-03

## 2023-02-03 PROBLEM — N39.0 URINARY TRACT INFECTION: Status: ACTIVE | Noted: 2023-02-03

## 2023-02-03 PROBLEM — S69.92XS THUMB INJURY, LEFT, SEQUELA: Status: ACTIVE | Noted: 2023-02-03

## 2023-02-03 PROBLEM — E66.812 CLASS 2 OBESITY WITH ALVEOLAR HYPOVENTILATION AND BODY MASS INDEX (BMI) OF 36.0 TO 36.9 IN ADULT: Status: ACTIVE | Noted: 2023-02-03

## 2023-02-03 PROBLEM — G25.81 RESTLESS LEGS SYNDROME: Status: ACTIVE | Noted: 2023-02-03

## 2023-02-03 PROBLEM — R06.83 SNORING: Status: ACTIVE | Noted: 2023-02-03

## 2023-02-03 PROBLEM — K59.00 CONSTIPATION: Status: ACTIVE | Noted: 2023-02-03

## 2023-02-03 PROBLEM — B18.2 HEPATITIS C, CHRONIC (MULTI): Status: ACTIVE | Noted: 2023-02-03

## 2023-02-03 PROBLEM — M79.602 PAIN OF LEFT UPPER EXTREMITY: Status: ACTIVE | Noted: 2023-02-03

## 2023-02-03 PROBLEM — M75.102 ROTATOR CUFF SYNDROME OF LEFT SHOULDER: Status: ACTIVE | Noted: 2023-02-03

## 2023-02-03 PROBLEM — M54.9 BACK PAIN: Status: ACTIVE | Noted: 2023-02-03

## 2023-02-03 PROBLEM — R63.5 WEIGHT GAIN: Status: ACTIVE | Noted: 2023-02-03

## 2023-02-03 PROBLEM — M54.2 NECK PAIN: Status: ACTIVE | Noted: 2023-02-03

## 2023-02-03 PROBLEM — R20.0 NUMBNESS: Status: ACTIVE | Noted: 2023-02-03

## 2023-02-03 PROBLEM — M54.16 CHRONIC LUMBAR RADICULOPATHY: Status: ACTIVE | Noted: 2023-02-03

## 2023-02-03 PROBLEM — M18.0 ARTHRITIS OF CARPOMETACARPAL (CMC) JOINT OF BOTH THUMBS: Status: ACTIVE | Noted: 2023-02-03

## 2023-02-03 PROBLEM — N52.9 ERECTILE DYSFUNCTION: Status: ACTIVE | Noted: 2023-02-03

## 2023-02-03 PROBLEM — B19.20 HEPATITIS C: Status: RESOLVED | Noted: 2023-02-03 | Resolved: 2023-02-03

## 2023-02-03 PROBLEM — E78.5 HYPERLIPIDEMIA: Status: ACTIVE | Noted: 2023-02-03

## 2023-02-03 PROBLEM — M25.559 HIP PAIN: Status: ACTIVE | Noted: 2023-02-03

## 2023-02-03 RX ORDER — FUROSEMIDE 20 MG/1
20 TABLET ORAL DAILY
COMMUNITY
End: 2023-04-21 | Stop reason: SDUPTHER

## 2023-02-03 RX ORDER — TADALAFIL 10 MG/1
TABLET ORAL
COMMUNITY
Start: 2022-10-10 | End: 2023-07-05 | Stop reason: DRUGHIGH

## 2023-02-03 RX ORDER — PREGABALIN 150 MG/1
1 CAPSULE ORAL 3 TIMES DAILY
COMMUNITY
Start: 2022-09-07 | End: 2024-01-02 | Stop reason: WASHOUT

## 2023-02-03 RX ORDER — ROPINIROLE 0.25 MG/1
0.25 TABLET, FILM COATED ORAL NIGHTLY
COMMUNITY
End: 2023-03-13

## 2023-02-03 RX ORDER — IBUPROFEN 800 MG/1
800 TABLET ORAL 3 TIMES DAILY
COMMUNITY
Start: 2022-03-21 | End: 2023-08-14 | Stop reason: SDUPTHER

## 2023-02-03 RX ORDER — PANTOPRAZOLE SODIUM 40 MG/1
1 TABLET, DELAYED RELEASE ORAL DAILY
COMMUNITY
Start: 2022-03-23 | End: 2024-03-11 | Stop reason: ALTCHOICE

## 2023-02-03 RX ORDER — ASPIRIN 81 MG/1
81 TABLET ORAL DAILY
COMMUNITY
End: 2023-09-11 | Stop reason: WASHOUT

## 2023-02-03 RX ORDER — METOPROLOL SUCCINATE 50 MG/1
1 TABLET, EXTENDED RELEASE ORAL DAILY
COMMUNITY
Start: 2022-05-25 | End: 2023-05-03

## 2023-02-03 RX ORDER — OLANZAPINE 10 MG/1
TABLET ORAL
COMMUNITY
End: 2023-09-11

## 2023-02-03 RX ORDER — METHYLPREDNISOLONE 4 MG/1
TABLET ORAL
COMMUNITY
End: 2023-03-13 | Stop reason: ALTCHOICE

## 2023-02-03 RX ORDER — ESCITALOPRAM OXALATE 10 MG/1
10 TABLET ORAL DAILY
COMMUNITY
End: 2024-03-11 | Stop reason: ALTCHOICE

## 2023-02-03 RX ORDER — ATORVASTATIN CALCIUM 40 MG/1
1 TABLET, FILM COATED ORAL DAILY
COMMUNITY
Start: 2022-06-08 | End: 2023-07-05 | Stop reason: SDUPTHER

## 2023-02-03 RX ORDER — ALBUTEROL SULFATE 90 UG/1
AEROSOL, METERED RESPIRATORY (INHALATION)
COMMUNITY
Start: 2021-11-10

## 2023-02-03 RX ORDER — AMLODIPINE BESYLATE 10 MG/1
1 TABLET ORAL DAILY
COMMUNITY
Start: 2018-06-21 | End: 2023-09-11 | Stop reason: SINTOL

## 2023-02-03 RX ORDER — HYDROCHLOROTHIAZIDE 25 MG/1
25 TABLET ORAL DAILY
COMMUNITY
Start: 2022-09-23 | End: 2023-05-03

## 2023-02-03 RX ORDER — LIDOCAINE 560 MG/1
PATCH PERCUTANEOUS; TOPICAL; TRANSDERMAL
COMMUNITY
Start: 2022-07-11 | End: 2023-09-11 | Stop reason: ALTCHOICE

## 2023-02-03 RX ORDER — TRAZODONE HYDROCHLORIDE 150 MG/1
150 TABLET ORAL NIGHTLY
COMMUNITY
End: 2023-03-13

## 2023-03-10 DIAGNOSIS — F51.01 PRIMARY INSOMNIA: Primary | ICD-10-CM

## 2023-03-10 DIAGNOSIS — G25.81 RESTLESS LEGS SYNDROME: ICD-10-CM

## 2023-03-10 DIAGNOSIS — R73.09 ELEVATED GLUCOSE LEVEL: ICD-10-CM

## 2023-03-13 RX ORDER — MELOXICAM 15 MG/1
15 TABLET ORAL
COMMUNITY
Start: 2023-02-28 | End: 2023-04-07

## 2023-03-13 RX ORDER — TRAZODONE HYDROCHLORIDE 150 MG/1
TABLET ORAL
Qty: 28 TABLET | Refills: 2 | Status: SHIPPED | OUTPATIENT
Start: 2023-03-13 | End: 2023-06-16

## 2023-03-13 RX ORDER — ACETAMINOPHEN 500 MG
5 TABLET ORAL
COMMUNITY
End: 2024-03-11 | Stop reason: ALTCHOICE

## 2023-03-13 RX ORDER — VELPATASVIR AND SOFOSBUVIR 100; 400 MG/1; MG/1
1 TABLET, FILM COATED ORAL
COMMUNITY
Start: 2023-02-16 | End: 2024-03-11 | Stop reason: ALTCHOICE

## 2023-03-13 RX ORDER — ROPINIROLE 0.25 MG/1
TABLET, FILM COATED ORAL
Qty: 30 TABLET | Refills: 2 | Status: SHIPPED | OUTPATIENT
Start: 2023-03-13 | End: 2023-06-16

## 2023-03-13 RX ORDER — SUCRALFATE 1 G/1
1 TABLET ORAL
COMMUNITY
Start: 2023-02-28 | End: 2023-04-07

## 2023-03-13 RX ORDER — HYDROXYZINE HYDROCHLORIDE 50 MG/1
50 TABLET, FILM COATED ORAL 2 TIMES DAILY
COMMUNITY
End: 2023-09-11 | Stop reason: SINTOL

## 2023-03-13 RX ORDER — LANCETS 33 GAUGE
EACH MISCELLANEOUS
COMMUNITY
Start: 2023-02-13 | End: 2023-05-19

## 2023-03-13 RX ORDER — ERENUMAB-AOOE 70 MG/ML
70 INJECTION SUBCUTANEOUS
COMMUNITY
Start: 2023-03-01 | End: 2023-09-11

## 2023-03-13 RX ORDER — SUMATRIPTAN SUCCINATE 100 MG/1
100 TABLET ORAL
COMMUNITY
Start: 2022-10-26 | End: 2023-09-11 | Stop reason: SDUPTHER

## 2023-03-13 RX ORDER — BLOOD-GLUCOSE METER
EACH MISCELLANEOUS
COMMUNITY
Start: 2023-02-13

## 2023-03-13 RX ORDER — BLOOD-GLUCOSE METER
EACH MISCELLANEOUS
Qty: 100 STRIP | Refills: 2 | Status: SHIPPED | OUTPATIENT
Start: 2023-03-13 | End: 2024-03-19

## 2023-03-13 RX ORDER — MECLIZINE HYDROCHLORIDE 25 MG/1
25 TABLET ORAL 2 TIMES DAILY
COMMUNITY

## 2023-03-13 RX ORDER — LANCETS 30 GAUGE
EACH MISCELLANEOUS
COMMUNITY
Start: 2023-02-13

## 2023-03-17 ENCOUNTER — OFFICE VISIT (OUTPATIENT)
Dept: PRIMARY CARE | Facility: CLINIC | Age: 53
End: 2023-03-17
Payer: MEDICARE

## 2023-03-17 VITALS
SYSTOLIC BLOOD PRESSURE: 124 MMHG | DIASTOLIC BLOOD PRESSURE: 85 MMHG | WEIGHT: 243.8 LBS | HEART RATE: 92 BPM | HEIGHT: 70 IN | BODY MASS INDEX: 34.9 KG/M2

## 2023-03-17 DIAGNOSIS — G54.2 CERVICAL NEUROPATHY: Primary | ICD-10-CM

## 2023-03-17 DIAGNOSIS — R79.89 ELEVATED LIVER FUNCTION TESTS: ICD-10-CM

## 2023-03-17 DIAGNOSIS — S69.92XS THUMB INJURY, LEFT, SEQUELA: ICD-10-CM

## 2023-03-17 DIAGNOSIS — I10 PRIMARY HYPERTENSION: ICD-10-CM

## 2023-03-17 DIAGNOSIS — G25.81 RESTLESS LEGS SYNDROME: ICD-10-CM

## 2023-03-17 DIAGNOSIS — R53.82 CHRONIC FATIGUE: ICD-10-CM

## 2023-03-17 DIAGNOSIS — G43.719 INTRACTABLE CHRONIC MIGRAINE WITHOUT AURA AND WITHOUT STATUS MIGRAINOSUS: ICD-10-CM

## 2023-03-17 DIAGNOSIS — B18.2 CHRONIC HEPATITIS C WITHOUT HEPATIC COMA (MULTI): ICD-10-CM

## 2023-03-17 DIAGNOSIS — M54.2 NECK PAIN: ICD-10-CM

## 2023-03-17 PROCEDURE — 99214 OFFICE O/P EST MOD 30 MIN: CPT | Performed by: REGISTERED NURSE

## 2023-03-17 PROCEDURE — 3074F SYST BP LT 130 MM HG: CPT | Performed by: REGISTERED NURSE

## 2023-03-17 PROCEDURE — 3079F DIAST BP 80-89 MM HG: CPT | Performed by: REGISTERED NURSE

## 2023-03-17 PROCEDURE — 3008F BODY MASS INDEX DOCD: CPT | Performed by: REGISTERED NURSE

## 2023-03-17 ASSESSMENT — ENCOUNTER SYMPTOMS
FEVER: 0
RHINORRHEA: 0
NERVOUS/ANXIOUS: 0
WHEEZING: 0
EYE DISCHARGE: 0
ABDOMINAL PAIN: 0
NAUSEA: 0
COUGH: 0
DIARRHEA: 0
WEAKNESS: 0
HEADACHES: 0
EYE REDNESS: 0
SHORTNESS OF BREATH: 0
VOMITING: 0
CONFUSION: 0
FREQUENCY: 0
DIFFICULTY URINATING: 0
WOUND: 0
DIZZINESS: 0
CHILLS: 0
CONSTIPATION: 0

## 2023-03-17 NOTE — PROGRESS NOTES
Subjective   Patient ID: Augie Tucker III is a 52 y.o. male who presents for Follow-up (States he is tired and weak).    HPI   Had [facility] of left thumb done, overall doing well. Has not followed up for a while, decreased strength. Did therapy a couple days a week and was doing good. Going to go to .      Weight gain: From August to today he has gained 20lbs. He noticed it about a month and a half ago, he is not following a diet, states he it too busy to cook special foods. He does not exercise, he shovel , states he has a lot of property. He thinks it could be r/t his lyrica. He was going to talk to her about this. He states it is not helping much.   States it's making him depressed.     Hep C: Has been taking the medication for a month Epclusa for 12 weeks.  Following up every 12 weeks.      RLS: He is constantly shaking his legs, started about a month ago. It used to be a side effect of medications. States he has walked on the treadmill before to try an help with this.      Right shoulder and back causing him pain, going to talk to Dr. Begum about this. No specific injury, taking his ibuprofen as needed, Voltaren does not help.      BLE: He is having swelling, he was started on low dose Lasix to take prior to this follow up and they have not been helping. He is not able to wear his regular shoes, states he has not increased his urination. He is having low back pain also. Denies blood in urine, pain with urinating.   Gets urine done daily, he is going to ask them to check urine culture next time they do it.      ED: He tried sildenafil and this did not help much. He was switched over tadalafil 10mg is working better for him and he was would like a refill.      Insomnia: States that zyprexa worked for him in the past for sleeping, he has to go to bed at 9pm but he has been up all night long. Increasing his hydroxazine to 50mg today.   He was given referral for sleep study before and never followed through     GERD:  "Waiting for appointment with GI. Dr. Woodruff October 25th. Needing prilosec for heartburn. He states he had one about 3 years ago. He is having issues with stomach burning, he is still losing weight. inconsistent, issues with diarrhea and constipation.     Shoulder pain: Out of Voltaren, interested in something else He was seeing Dr. Begum before for his back and neck and shoulder pains. Cant use his left shoulder The steroid helped before, cant sleep on it. This could be worsening his sleep. Lidocaine patches have helped in the past. Would like another round of medrol dose pack as this has also helped.      Neck Pain: He has a follow up with Neuro. He is seeing Aleah Avalos on Aug. 11th. He states its causing \"combustion in his head\", Causing blurred vision. Still feeling like someone is standing on his chest at times. He was told that he might have bone spurrs that could be causing issues in his neck. He was having left arm numbness. States that he was told he could see neurosurgery, needing referral. He was started on naproxen last time, Its not helping, has been taking Indocin from the hospital for a week. He is out of ibuprofen, only had it for 18 days 800mg tabs, this has helped in the past.   He is having pain in the back of his neck, states he feels popping and pain up the back of his neck. Also has hand numbness and tingling. Happening daily. States that he was able to bring his carpal tunnel braces for this. He has been given ibuprofen there for pain/headache and this is not helping much. States naproxen has helped in the past.      Going to see Dr. Light. Stopped the sumatriptan and taking auto injection once per month.      Chest pains: States this has improved a lot, bp has improved also.      He had some TIA's before in the past, he was worried about this.      All other systems reviewed and negative for complaint unless stated above.   Review of Systems   Constitutional:  Negative for chills and " "fever.   HENT:  Negative for congestion, ear pain and rhinorrhea.    Eyes:  Negative for discharge and redness.   Respiratory:  Negative for cough, shortness of breath and wheezing.    Cardiovascular:  Negative for chest pain and leg swelling.   Gastrointestinal:  Negative for abdominal pain, constipation, diarrhea, nausea and vomiting.   Genitourinary:  Negative for difficulty urinating, frequency and urgency.   Musculoskeletal:  Negative for gait problem.   Skin:  Negative for rash and wound.   Neurological:  Negative for dizziness, weakness and headaches.   Psychiatric/Behavioral:  Negative for confusion. The patient is not nervous/anxious.        Objective   /85 (BP Location: Right arm)   Pulse 92   Ht 1.778 m (5' 10\")   Wt 111 kg (243 lb 12.8 oz)   BMI 34.98 kg/m²     Physical Exam  Vitals reviewed.   Constitutional:       Appearance: Normal appearance.   HENT:      Head: Normocephalic.      Right Ear: Tympanic membrane, ear canal and external ear normal.      Left Ear: Tympanic membrane, ear canal and external ear normal.      Nose: No rhinorrhea.      Mouth/Throat:      Mouth: Mucous membranes are moist.      Pharynx: Oropharynx is clear.   Eyes:      Pupils: Pupils are equal, round, and reactive to light.   Cardiovascular:      Rate and Rhythm: Normal rate and regular rhythm.      Pulses: Normal pulses.   Pulmonary:      Effort: Pulmonary effort is normal.      Breath sounds: Normal breath sounds.   Abdominal:      General: Abdomen is flat. Bowel sounds are normal.      Palpations: Abdomen is soft.   Musculoskeletal:         General: No tenderness. Normal range of motion.      Right lower leg: No edema.      Left lower leg: No edema.   Lymphadenopathy:      Cervical: No cervical adenopathy.   Skin:     General: Skin is warm and dry.      Findings: No rash.   Neurological:      Mental Status: He is alert and oriented to person, place, and time.   Psychiatric:         Mood and Affect: Mood normal.    "      Behavior: Behavior normal.       Assessment/Plan        #Fatigue   #Weight gain   blood work ordered   discussed diet and exercise      #RLS  blood work ordered   discussed supportive care   if blood work normal discussed possible ropinirole     #ED  Sildenafil did not help   Continue tadalafil      #Neck pain  Xrays reviewd, degenerative changes   rx ibuprofen 800mg      #Shoulder pain   established with Dr. Begum     #Migraines  Following with Dr. Light      #Chest pains  #Anxiety  Improved   Lexapro 20mg      #Insomnia   increased hydroxyzine   Seeing Psych with community counseling  sleep study ordered      #GERD  #Abdominal pain  #Diarrhea  #Constipation  GI referral provided   refilled pantorprazole      #Edema  elevate feet as able   lasix 20mg daily x 14 days      #HTN   Blood work ordered   continue amlodipine   Starting metoprolol   Check bp   Would like bp cuff ordered      #Asthma   Continue albuterol      Follow up in 4 months or sooner if needed

## 2023-03-27 ENCOUNTER — TELEPHONE (OUTPATIENT)
Dept: PRIMARY CARE | Facility: CLINIC | Age: 53
End: 2023-03-27
Payer: MEDICARE

## 2023-03-27 DIAGNOSIS — R63.5 WEIGHT GAIN: Primary | ICD-10-CM

## 2023-03-27 DIAGNOSIS — R73.09 ELEVATED GLUCOSE: ICD-10-CM

## 2023-03-27 LAB
ALANINE AMINOTRANSFERASE (SGPT) (U/L) IN SER/PLAS: 23 U/L (ref 10–52)
ALBUMIN (G/DL) IN SER/PLAS: 4 G/DL (ref 3.4–5)
ALKALINE PHOSPHATASE (U/L) IN SER/PLAS: 126 U/L (ref 33–120)
ASPARTATE AMINOTRANSFERASE (SGOT) (U/L) IN SER/PLAS: 16 U/L (ref 9–39)
BILIRUBIN DIRECT (MG/DL) IN SER/PLAS: 0.1 MG/DL (ref 0–0.3)
BILIRUBIN TOTAL (MG/DL) IN SER/PLAS: 0.4 MG/DL (ref 0–1.2)
PROTEIN TOTAL: 6.8 G/DL (ref 6.4–8.2)

## 2023-03-27 RX ORDER — DULAGLUTIDE 0.75 MG/.5ML
0.75 INJECTION, SOLUTION SUBCUTANEOUS
Qty: 4 EACH | Refills: 1 | Status: SHIPPED | OUTPATIENT
Start: 2023-03-27 | End: 2023-08-14

## 2023-03-27 NOTE — TELEPHONE ENCOUNTER
[facility] and [facility] Informed him of possible nausea/upset stomach for the first few weeks of treatment.

## 2023-03-27 NOTE — TELEPHONE ENCOUNTER
Patient called about weight gain. He's concerned as a recovering addict he's going to relapse. His  recommended he call and ask if we can rx Mounjaro for weight loss and to help his A1c. Trulicity is his insurance's preferred medication.

## 2023-03-28 LAB
HCV PCR QUANT: NOT DETECTED IU/ML
HCV RNA, PCR LOG: NORMAL LOG10 IU/ML

## 2023-04-06 DIAGNOSIS — N52.9 ERECTILE DYSFUNCTION, UNSPECIFIED ERECTILE DYSFUNCTION TYPE: ICD-10-CM

## 2023-04-06 DIAGNOSIS — K59.00 CONSTIPATION, UNSPECIFIED CONSTIPATION TYPE: ICD-10-CM

## 2023-04-06 DIAGNOSIS — M54.2 NECK PAIN: ICD-10-CM

## 2023-04-07 RX ORDER — SUCRALFATE 1 G/1
TABLET ORAL
Qty: 60 TABLET | Refills: 2 | Status: SHIPPED | OUTPATIENT
Start: 2023-04-07 | End: 2023-04-17 | Stop reason: SDUPTHER

## 2023-04-07 RX ORDER — MELOXICAM 15 MG/1
TABLET ORAL
Qty: 30 TABLET | Refills: 2 | Status: SHIPPED | OUTPATIENT
Start: 2023-04-07 | End: 2023-06-06

## 2023-04-07 RX ORDER — SILDENAFIL 100 MG/1
TABLET, FILM COATED ORAL
Qty: 11 TABLET | Refills: 2 | Status: SHIPPED | OUTPATIENT
Start: 2023-04-07 | End: 2023-04-18 | Stop reason: SDUPTHER

## 2023-04-10 RX ORDER — KETOROLAC TROMETHAMINE 10 MG/1
TABLET, FILM COATED ORAL
COMMUNITY
Start: 2023-04-03 | End: 2023-09-11 | Stop reason: ALTCHOICE

## 2023-04-10 RX ORDER — HYDROXYZINE PAMOATE 50 MG/1
CAPSULE ORAL
COMMUNITY
Start: 2023-03-27 | End: 2023-08-14 | Stop reason: WASHOUT

## 2023-04-10 RX ORDER — DULOXETIN HYDROCHLORIDE 60 MG/1
CAPSULE, DELAYED RELEASE ORAL
COMMUNITY
Start: 2023-03-27 | End: 2023-09-11

## 2023-04-12 DIAGNOSIS — E66.2 CLASS 2 OBESITY WITH ALVEOLAR HYPOVENTILATION WITHOUT SERIOUS COMORBIDITY WITH BODY MASS INDEX (BMI) OF 36.0 TO 36.9 IN ADULT (MULTI): Primary | ICD-10-CM

## 2023-04-12 RX ORDER — TIRZEPATIDE 2.5 MG/.5ML
2.5 INJECTION, SOLUTION SUBCUTANEOUS
Qty: 0.5 ML | Refills: 1 | Status: SHIPPED | OUTPATIENT
Start: 2023-04-12 | End: 2023-04-17

## 2023-04-13 ENCOUNTER — APPOINTMENT (OUTPATIENT)
Dept: PRIMARY CARE | Facility: CLINIC | Age: 53
End: 2023-04-13
Payer: MEDICARE

## 2023-04-17 DIAGNOSIS — K59.00 CONSTIPATION, UNSPECIFIED CONSTIPATION TYPE: ICD-10-CM

## 2023-04-17 DIAGNOSIS — E66.2 CLASS 2 OBESITY WITH ALVEOLAR HYPOVENTILATION WITHOUT SERIOUS COMORBIDITY WITH BODY MASS INDEX (BMI) OF 36.0 TO 36.9 IN ADULT (MULTI): Primary | ICD-10-CM

## 2023-04-17 RX ORDER — SUCRALFATE 1 G/1
1 TABLET ORAL EVERY 12 HOURS
Qty: 60 TABLET | Refills: 2 | Status: SHIPPED | OUTPATIENT
Start: 2023-04-17 | End: 2023-06-16

## 2023-04-18 DIAGNOSIS — N52.9 ERECTILE DYSFUNCTION, UNSPECIFIED ERECTILE DYSFUNCTION TYPE: ICD-10-CM

## 2023-04-18 RX ORDER — SILDENAFIL 100 MG/1
TABLET, FILM COATED ORAL
Qty: 11 TABLET | Refills: 2 | Status: SHIPPED | OUTPATIENT
Start: 2023-04-18 | End: 2023-08-14 | Stop reason: WASHOUT

## 2023-04-19 ENCOUNTER — APPOINTMENT (OUTPATIENT)
Dept: PRIMARY CARE | Facility: CLINIC | Age: 53
End: 2023-04-19
Payer: MEDICARE

## 2023-04-21 DIAGNOSIS — G62.89 OTHER POLYNEUROPATHY: ICD-10-CM

## 2023-04-21 DIAGNOSIS — N52.9 ERECTILE DYSFUNCTION, UNSPECIFIED ERECTILE DYSFUNCTION TYPE: Primary | ICD-10-CM

## 2023-04-21 DIAGNOSIS — R60.9 PERIPHERAL EDEMA: ICD-10-CM

## 2023-04-21 RX ORDER — TADALAFIL 10 MG/1
10 TABLET ORAL DAILY PRN
Qty: 10 TABLET | Refills: 0 | OUTPATIENT
Start: 2023-04-21

## 2023-04-21 RX ORDER — FUROSEMIDE 20 MG/1
20 TABLET ORAL DAILY
Qty: 14 TABLET | Refills: 0 | Status: SHIPPED | OUTPATIENT
Start: 2023-04-21 | End: 2023-07-07 | Stop reason: SDUPTHER

## 2023-04-21 RX ORDER — TADALAFIL 20 MG/1
20 TABLET ORAL DAILY PRN
Qty: 12 TABLET | Refills: 3 | Status: SHIPPED | OUTPATIENT
Start: 2023-04-21 | End: 2023-07-05 | Stop reason: DRUGHIGH

## 2023-04-25 ENCOUNTER — TELEPHONE (OUTPATIENT)
Dept: PRIMARY CARE | Facility: CLINIC | Age: 53
End: 2023-04-25
Payer: MEDICARE

## 2023-05-02 ENCOUNTER — TELEPHONE (OUTPATIENT)
Dept: PRIMARY CARE | Facility: CLINIC | Age: 53
End: 2023-05-02
Payer: MEDICARE

## 2023-05-02 DIAGNOSIS — M79.673 PAIN OF FOOT, UNSPECIFIED LATERALITY: Primary | ICD-10-CM

## 2023-05-03 DIAGNOSIS — I10 PRIMARY HYPERTENSION: Primary | ICD-10-CM

## 2023-05-03 RX ORDER — HYDROCHLOROTHIAZIDE 25 MG/1
25 TABLET ORAL DAILY
Qty: 90 TABLET | Refills: 3 | Status: SHIPPED | OUTPATIENT
Start: 2023-05-03 | End: 2024-04-01

## 2023-05-03 RX ORDER — METOPROLOL SUCCINATE 50 MG/1
50 TABLET, EXTENDED RELEASE ORAL DAILY
Qty: 90 TABLET | Refills: 3 | Status: SHIPPED | OUTPATIENT
Start: 2023-05-03 | End: 2024-04-01

## 2023-05-09 ENCOUNTER — HOSPITAL ENCOUNTER (OUTPATIENT)
Dept: DATA CONVERSION | Facility: HOSPITAL | Age: 53
End: 2023-05-09
Attending: PODIATRIST | Admitting: PODIATRIST
Payer: MEDICARE

## 2023-05-09 DIAGNOSIS — M72.2 PLANTAR FASCIAL FIBROMATOSIS: ICD-10-CM

## 2023-05-09 DIAGNOSIS — Z79.82 LONG TERM (CURRENT) USE OF ASPIRIN: ICD-10-CM

## 2023-05-09 DIAGNOSIS — B19.20 UNSPECIFIED VIRAL HEPATITIS C WITHOUT HEPATIC COMA: ICD-10-CM

## 2023-05-09 DIAGNOSIS — F17.200 NICOTINE DEPENDENCE, UNSPECIFIED, UNCOMPLICATED: ICD-10-CM

## 2023-05-09 DIAGNOSIS — M77.32 CALCANEAL SPUR, LEFT FOOT: ICD-10-CM

## 2023-05-09 DIAGNOSIS — I10 ESSENTIAL (PRIMARY) HYPERTENSION: ICD-10-CM

## 2023-05-09 DIAGNOSIS — J45.909 UNSPECIFIED ASTHMA, UNCOMPLICATED (HHS-HCC): ICD-10-CM

## 2023-05-09 DIAGNOSIS — Z86.14 PERSONAL HISTORY OF METHICILLIN RESISTANT STAPHYLOCOCCUS AUREUS INFECTION: ICD-10-CM

## 2023-05-09 DIAGNOSIS — Z86.73 PERSONAL HISTORY OF TRANSIENT ISCHEMIC ATTACK (TIA), AND CEREBRAL INFARCTION WITHOUT RESIDUAL DEFICITS: ICD-10-CM

## 2023-05-17 ENCOUNTER — TELEPHONE (OUTPATIENT)
Dept: PRIMARY CARE | Facility: CLINIC | Age: 53
End: 2023-05-17
Payer: MEDICARE

## 2023-05-17 NOTE — TELEPHONE ENCOUNTER
Pt had drug court and his UDS came up positive for hector but was told it could be from a bacterial infection. He has court tomorrow morning and was suppose to get a letter today from a PCP stating that it could be another explanation for this. Informed him that we are closed today and I most likely will not be able to get back to him until tomorrow.     Melanie Lucio MA

## 2023-05-18 DIAGNOSIS — E11.9 TYPE 2 DIABETES MELLITUS WITHOUT COMPLICATION, UNSPECIFIED WHETHER LONG TERM INSULIN USE (MULTI): ICD-10-CM

## 2023-05-19 PROBLEM — E11.9 DIABETES MELLITUS TYPE 2, UNCOMPLICATED (MULTI): Status: ACTIVE | Noted: 2023-05-19

## 2023-05-19 RX ORDER — LANCETS 33 GAUGE
EACH MISCELLANEOUS
Qty: 100 EACH | Refills: 2 | Status: SHIPPED | OUTPATIENT
Start: 2023-05-19 | End: 2024-02-07

## 2023-05-22 LAB
ALANINE AMINOTRANSFERASE (SGPT) (U/L) IN SER/PLAS: 18 U/L (ref 10–52)
ALBUMIN (G/DL) IN SER/PLAS: 4.1 G/DL (ref 3.4–5)
ALKALINE PHOSPHATASE (U/L) IN SER/PLAS: 95 U/L (ref 33–120)
ASPARTATE AMINOTRANSFERASE (SGOT) (U/L) IN SER/PLAS: 14 U/L (ref 9–39)
BILIRUBIN DIRECT (MG/DL) IN SER/PLAS: 0 MG/DL (ref 0–0.3)
BILIRUBIN TOTAL (MG/DL) IN SER/PLAS: 0.5 MG/DL (ref 0–1.2)
PROTEIN TOTAL: 7.1 G/DL (ref 6.4–8.2)

## 2023-05-23 LAB
HCV PCR QUANT: NOT DETECTED IU/ML
HCV RNA, PCR LOG: NORMAL LOG10 IU/ML

## 2023-06-01 LAB
COMPLETE PATHOLOGY REPORT: NORMAL
CONVERTED CLINICAL DIAGNOSIS-HISTORY: NORMAL
CONVERTED FINAL DIAGNOSIS: NORMAL
CONVERTED FINAL REPORT PDF LINK TO COPY AND PASTE: NORMAL
CONVERTED GROSS DESCRIPTION: NORMAL

## 2023-06-15 DIAGNOSIS — F51.01 PRIMARY INSOMNIA: ICD-10-CM

## 2023-06-15 DIAGNOSIS — G25.81 RESTLESS LEGS SYNDROME: ICD-10-CM

## 2023-06-16 RX ORDER — ROPINIROLE 0.25 MG/1
TABLET, FILM COATED ORAL
Qty: 30 TABLET | Refills: 2 | Status: SHIPPED | OUTPATIENT
Start: 2023-06-16 | End: 2023-09-05

## 2023-06-16 RX ORDER — TRAZODONE HYDROCHLORIDE 150 MG/1
TABLET ORAL
Qty: 28 TABLET | Refills: 2 | Status: SHIPPED | OUTPATIENT
Start: 2023-06-16 | End: 2023-07-05 | Stop reason: SDUPTHER

## 2023-06-27 PROBLEM — K76.0 HEPATIC STEATOSIS: Status: ACTIVE | Noted: 2023-06-27

## 2023-06-27 PROBLEM — R73.09 ELEVATED GLUCOSE: Status: ACTIVE | Noted: 2023-06-27

## 2023-07-05 DIAGNOSIS — N52.9 ERECTILE DYSFUNCTION, UNSPECIFIED ERECTILE DYSFUNCTION TYPE: ICD-10-CM

## 2023-07-05 DIAGNOSIS — E66.2 CLASS 2 OBESITY WITH ALVEOLAR HYPOVENTILATION WITHOUT SERIOUS COMORBIDITY WITH BODY MASS INDEX (BMI) OF 36.0 TO 36.9 IN ADULT (MULTI): ICD-10-CM

## 2023-07-05 DIAGNOSIS — E78.2 MODERATE MIXED HYPERLIPIDEMIA NOT REQUIRING STATIN THERAPY: ICD-10-CM

## 2023-07-05 DIAGNOSIS — F51.01 PRIMARY INSOMNIA: ICD-10-CM

## 2023-07-05 RX ORDER — TRAZODONE HYDROCHLORIDE 150 MG/1
150 TABLET ORAL NIGHTLY
Qty: 28 TABLET | Refills: 2 | Status: CANCELLED | OUTPATIENT
Start: 2023-07-05

## 2023-07-05 RX ORDER — TADALAFIL 20 MG/1
20 TABLET ORAL DAILY PRN
Qty: 12 TABLET | Refills: 3 | Status: SHIPPED | OUTPATIENT
Start: 2023-07-05 | End: 2023-11-14 | Stop reason: SDUPTHER

## 2023-07-05 RX ORDER — TRAZODONE HYDROCHLORIDE 100 MG/1
200 TABLET ORAL NIGHTLY
Qty: 120 TABLET | Refills: 1 | Status: SHIPPED | OUTPATIENT
Start: 2023-07-05 | End: 2023-10-06

## 2023-07-05 RX ORDER — ATORVASTATIN CALCIUM 40 MG/1
40 TABLET, FILM COATED ORAL DAILY
Qty: 90 TABLET | Refills: 3 | Status: SHIPPED | OUTPATIENT
Start: 2023-07-05 | End: 2024-05-07

## 2023-07-05 NOTE — TELEPHONE ENCOUNTER
Pt states he needs refills and would like to know if his Ozempic and his trazadone could be increased.

## 2023-07-07 ENCOUNTER — TELEPHONE (OUTPATIENT)
Dept: PRIMARY CARE | Facility: CLINIC | Age: 53
End: 2023-07-07
Payer: MEDICARE

## 2023-07-07 DIAGNOSIS — R60.9 PERIPHERAL EDEMA: ICD-10-CM

## 2023-07-07 DIAGNOSIS — F17.210 CIGARETTE NICOTINE DEPENDENCE WITHOUT COMPLICATION: Primary | ICD-10-CM

## 2023-07-07 RX ORDER — IBUPROFEN 200 MG
1 TABLET ORAL EVERY 24 HOURS
Qty: 30 PATCH | Refills: 0 | Status: SHIPPED | OUTPATIENT
Start: 2023-07-07 | End: 2023-08-14

## 2023-07-07 RX ORDER — FUROSEMIDE 20 MG/1
20 TABLET ORAL DAILY
Qty: 14 TABLET | Refills: 0 | Status: SHIPPED | OUTPATIENT
Start: 2023-07-07 | End: 2023-11-29

## 2023-07-07 NOTE — TELEPHONE ENCOUNTER
Pt would like to know if he could get a water pill as he is retaining water pretty bad. Would also like to know if he can some nicotine patches.

## 2023-07-11 DIAGNOSIS — M18.0 ARTHRITIS OF CARPOMETACARPAL (CMC) JOINT OF BOTH THUMBS: ICD-10-CM

## 2023-07-11 RX ORDER — MELOXICAM 15 MG/1
TABLET ORAL
Qty: 30 TABLET | Refills: 2 | Status: SHIPPED | OUTPATIENT
Start: 2023-07-11 | End: 2023-08-14 | Stop reason: WASHOUT

## 2023-07-13 ENCOUNTER — PATIENT OUTREACH (OUTPATIENT)
Dept: PRIMARY CARE | Facility: CLINIC | Age: 53
End: 2023-07-13
Payer: MEDICARE

## 2023-07-13 NOTE — PROGRESS NOTES
Discharge Facility: [facility]  Discharge Diagnosis: Cellulitis  Admission Date: 10 July 23  Discharge Date: 12 July 23    PCP Appointment Date: Tasked to PCP office for scheduling.   Specialist Appointment Date: N/A  Hospital Encounter and Summary: Linked    No contact made on discharge outreach attempt. Tasked to PCP office for scheduling. Will attempt outreach again in 2 wks.

## 2023-07-17 ENCOUNTER — APPOINTMENT (OUTPATIENT)
Dept: PRIMARY CARE | Facility: CLINIC | Age: 53
End: 2023-07-17
Payer: MEDICARE

## 2023-07-26 ENCOUNTER — PATIENT OUTREACH (OUTPATIENT)
Dept: PRIMARY CARE | Facility: CLINIC | Age: 53
End: 2023-07-26

## 2023-07-26 ENCOUNTER — APPOINTMENT (OUTPATIENT)
Dept: PRIMARY CARE | Facility: CLINIC | Age: 53
End: 2023-07-26
Payer: MEDICARE

## 2023-07-27 ENCOUNTER — NURSING HOME VISIT (OUTPATIENT)
Dept: POST ACUTE CARE | Facility: EXTERNAL LOCATION | Age: 53
End: 2023-07-27
Payer: MEDICARE

## 2023-07-27 DIAGNOSIS — R60.9 EDEMA, UNSPECIFIED TYPE: ICD-10-CM

## 2023-07-27 DIAGNOSIS — K59.00 CONSTIPATION, UNSPECIFIED CONSTIPATION TYPE: ICD-10-CM

## 2023-07-27 DIAGNOSIS — L08.9 LEFT FOOT INFECTION: ICD-10-CM

## 2023-07-27 DIAGNOSIS — I10 PRIMARY HYPERTENSION: Primary | ICD-10-CM

## 2023-07-27 DIAGNOSIS — G47.00 INSOMNIA, UNSPECIFIED TYPE: ICD-10-CM

## 2023-07-27 DIAGNOSIS — F32.A DEPRESSION, UNSPECIFIED DEPRESSION TYPE: ICD-10-CM

## 2023-07-27 DIAGNOSIS — R53.81 PHYSICAL DECONDITIONING: ICD-10-CM

## 2023-07-27 DIAGNOSIS — E11.9 TYPE 2 DIABETES MELLITUS WITHOUT COMPLICATION, WITHOUT LONG-TERM CURRENT USE OF INSULIN (MULTI): ICD-10-CM

## 2023-07-27 DIAGNOSIS — J44.9 CHRONIC OBSTRUCTIVE PULMONARY DISEASE, UNSPECIFIED COPD TYPE (MULTI): ICD-10-CM

## 2023-07-27 DIAGNOSIS — G62.9 NEUROPATHY: ICD-10-CM

## 2023-07-27 DIAGNOSIS — E78.5 HYPERLIPIDEMIA, UNSPECIFIED HYPERLIPIDEMIA TYPE: ICD-10-CM

## 2023-07-27 PROCEDURE — 99309 SBSQ NF CARE MODERATE MDM 30: CPT | Performed by: NURSE PRACTITIONER

## 2023-07-27 NOTE — LETTER
Patient: Augie Tucker  : 1970    Encounter Date: 2023    Patient ID: Augie Tucker is a 53 y.o. male who is acute skilled care being seen and evaluated for multiple medical problems.  [id number]   1970    /79   Pulse 65   Temp 36.3 °C (97.3 °F)   Resp 15   Wt 117 kg (259 lb)   SpO2 96%   BMI 37.16 kg/m²     Assessment/Plan  Problem List Items Addressed This Visit       Constipation     Colace 100 mg by mouth BID  MiraLax 17 grams by mouth daily   Senna 8.6 mg by mouth BID as needed  Bisacodyl 10 mg MO daily as needed   MOM 30 ml by mouth daily as needed          Depression     Cymbalta 60 mg by mouth BID         Edema     Lasix 10 mg by mouth daily          HTN (hypertension) - Primary     Norvasc 10 mg by mouth daily          Hyperlipidemia     Atorvastatin 40 mg by mouth every HS          Insomnia     Trazodone 200 mg by mouth every HS         Diabetes mellitus type 2, uncomplicated (CMS/HCC)     Ozempic 0.25 mg subcutaneous weekly          Neuropathy     Lyrica 200 mg by mouth TID          Chronic obstructive pulmonary disease (CMS/HCC)     Albuterol 90 mcg two inhalations every 4 hrs as needed          Physical deconditioning     PT         Left foot infection     Zosyn 3.375 grams IV every 6 hrs (Stop date 2023)   Dr Guillen apt 2023   Per client initial surgery 2023- bone spur                HPI: Client was admitted at [facility] from 2023- 2023 with the final diagnosis of Left foot infection. Non adherence issues RT OP treatment. Left foot culture- polymicrobial. ID consulted and reccommended 14 days of Zosyn. BC (-). Client had right inguinal pain, Pelvic US showed mesh. Client pending follow up apt with Dr Guillen (Podiatry) 2023. Client denies HA, dizziness, or lightheadedness. Denies CP, SOB, Cough, or increased edema. RA. Denies abdominal pain, N/V, or diarrhea. C/O constipation. States he has not had a bowel movement in 3 weeks. Denies dysuria.  Denies change  in appetite. C/O intermittent right groin pain.     Review of Systems ROS as described in in HPI     Physical Exam  Constitutional:       Comments: Sitting at bedside visiting with family    HENT:      Mouth/Throat:      Mouth: Mucous membranes are moist.   Cardiovascular:      Rate and Rhythm: Normal rate.   Pulmonary:      Effort: Pulmonary effort is normal.      Breath sounds: Normal breath sounds.      Comments: RA  Abdominal:      General: Bowel sounds are normal. There is distension.   Genitourinary:     Comments: Denies dysuria   Musculoskeletal:      Cervical back: Neck supple.      Comments: PT/OT   Skin:     General: Skin is warm and dry.      Comments: Left foot dressing D/I   RUE PICC line intact    Neurological:      Mental Status: He is alert. Mental status is at baseline.   Psychiatric:         Mood and Affect: Mood normal.      Comments: Conversational                    Electronically Signed By: LEBRON Estrada   7/29/23  1:24 PM

## 2023-07-29 VITALS
HEART RATE: 65 BPM | SYSTOLIC BLOOD PRESSURE: 120 MMHG | WEIGHT: 259 LBS | RESPIRATION RATE: 15 BRPM | TEMPERATURE: 97.3 F | DIASTOLIC BLOOD PRESSURE: 79 MMHG | BODY MASS INDEX: 37.16 KG/M2 | OXYGEN SATURATION: 96 %

## 2023-07-29 PROBLEM — H52.203 HYPEROPIA WITH ASTIGMATISM AND PRESBYOPIA, BILATERAL: Status: ACTIVE | Noted: 2018-08-03

## 2023-07-29 PROBLEM — R41.0 CONFUSION: Status: RESOLVED | Noted: 2023-02-03 | Resolved: 2023-07-29

## 2023-07-29 PROBLEM — M79.644 PAIN OF RIGHT THUMB: Status: RESOLVED | Noted: 2023-02-03 | Resolved: 2023-07-29

## 2023-07-29 PROBLEM — I63.9 STROKE (CEREBRUM) (MULTI): Status: ACTIVE | Noted: 2022-03-05

## 2023-07-29 PROBLEM — R06.83 SNORING: Status: RESOLVED | Noted: 2023-02-03 | Resolved: 2023-07-29

## 2023-07-29 PROBLEM — G62.9 NEUROPATHY: Status: ACTIVE | Noted: 2023-07-29

## 2023-07-29 PROBLEM — Z98.890 S/P FOOT SURGERY, LEFT: Status: ACTIVE | Noted: 2023-07-10

## 2023-07-29 PROBLEM — N39.0 URINARY TRACT INFECTION: Status: RESOLVED | Noted: 2023-02-03 | Resolved: 2023-07-29

## 2023-07-29 PROBLEM — M79.645 PAIN OF LEFT THUMB: Status: RESOLVED | Noted: 2023-02-03 | Resolved: 2023-07-29

## 2023-07-29 PROBLEM — F43.10 PTSD (POST-TRAUMATIC STRESS DISORDER): Status: ACTIVE | Noted: 2022-03-05

## 2023-07-29 PROBLEM — F12.99: Chronic | Status: ACTIVE | Noted: 2022-01-11

## 2023-07-29 PROBLEM — F17.200 TOBACCO USE DISORDER: Status: ACTIVE | Noted: 2022-01-11

## 2023-07-29 PROBLEM — J45.909 ASTHMA (HHS-HCC): Status: RESOLVED | Noted: 2023-02-03 | Resolved: 2023-07-29

## 2023-07-29 PROBLEM — T63.301A SPIDER BITE: Status: RESOLVED | Noted: 2023-02-03 | Resolved: 2023-07-29

## 2023-07-29 PROBLEM — R20.0 NUMBNESS: Status: RESOLVED | Noted: 2023-02-03 | Resolved: 2023-07-29

## 2023-07-29 PROBLEM — T81.31XA WOUND DEHISCENCE, SURGICAL, INITIAL ENCOUNTER: Status: ACTIVE | Noted: 2023-07-12

## 2023-07-29 PROBLEM — R63.4 WEIGHT LOSS, UNINTENTIONAL: Status: RESOLVED | Noted: 2023-02-03 | Resolved: 2023-07-29

## 2023-07-29 PROBLEM — G47.33 OBSTRUCTIVE SLEEP APNEA: Status: ACTIVE | Noted: 2023-07-29

## 2023-07-29 PROBLEM — R53.83 FATIGUE: Status: RESOLVED | Noted: 2023-02-03 | Resolved: 2023-07-29

## 2023-07-29 PROBLEM — R10.9 ABDOMINAL PAIN: Status: RESOLVED | Noted: 2023-02-03 | Resolved: 2023-07-29

## 2023-07-29 PROBLEM — M25.519 SHOULDER PAIN: Status: RESOLVED | Noted: 2023-02-03 | Resolved: 2023-07-29

## 2023-07-29 PROBLEM — R73.09 ELEVATED GLUCOSE: Status: RESOLVED | Noted: 2023-06-27 | Resolved: 2023-07-29

## 2023-07-29 PROBLEM — H52.03 HYPEROPIA WITH ASTIGMATISM AND PRESBYOPIA, BILATERAL: Status: ACTIVE | Noted: 2018-08-03

## 2023-07-29 PROBLEM — J32.9 SINUSITIS: Status: RESOLVED | Noted: 2023-02-03 | Resolved: 2023-07-29

## 2023-07-29 PROBLEM — H52.4 HYPEROPIA WITH ASTIGMATISM AND PRESBYOPIA, BILATERAL: Status: ACTIVE | Noted: 2018-08-03

## 2023-07-29 PROBLEM — R53.81 PHYSICAL DECONDITIONING: Status: ACTIVE | Noted: 2023-07-29

## 2023-07-29 PROBLEM — L08.9 LEFT FOOT INFECTION: Status: ACTIVE | Noted: 2023-07-29

## 2023-07-29 PROBLEM — R63.5 WEIGHT GAIN: Status: RESOLVED | Noted: 2023-02-03 | Resolved: 2023-07-29

## 2023-07-29 PROBLEM — M79.602 PAIN OF LEFT UPPER EXTREMITY: Status: RESOLVED | Noted: 2023-02-03 | Resolved: 2023-07-29

## 2023-07-29 PROBLEM — J44.9 CHRONIC OBSTRUCTIVE PULMONARY DISEASE (MULTI): Status: ACTIVE | Noted: 2023-07-29

## 2023-07-29 PROBLEM — M25.559 HIP PAIN: Status: RESOLVED | Noted: 2023-02-03 | Resolved: 2023-07-29

## 2023-07-29 PROBLEM — I63.9 STROKE (CEREBRUM) (MULTI): Status: RESOLVED | Noted: 2022-03-05 | Resolved: 2023-07-29

## 2023-07-29 PROBLEM — M54.2 NECK PAIN: Status: RESOLVED | Noted: 2023-02-03 | Resolved: 2023-07-29

## 2023-07-29 NOTE — ASSESSMENT & PLAN NOTE
Colace 100 mg by mouth BID  MiraLax 17 grams by mouth daily   Senna 8.6 mg by mouth BID as needed  Bisacodyl 10 mg TX daily as needed   MOM 30 ml by mouth daily as needed

## 2023-07-29 NOTE — PROGRESS NOTES
Patient ID: Augie Tucker is a 53 y.o. male who is acute skilled care being seen and evaluated for multiple medical problems.  [id number]   1970    /79   Pulse 65   Temp 36.3 °C (97.3 °F)   Resp 15   Wt 117 kg (259 lb)   SpO2 96%   BMI 37.16 kg/m²     Assessment/Plan  Problem List Items Addressed This Visit       Constipation     Colace 100 mg by mouth BID  MiraLax 17 grams by mouth daily   Senna 8.6 mg by mouth BID as needed  Bisacodyl 10 mg AR daily as needed   MOM 30 ml by mouth daily as needed          Depression     Cymbalta 60 mg by mouth BID         Edema     Lasix 10 mg by mouth daily          HTN (hypertension) - Primary     Norvasc 10 mg by mouth daily          Hyperlipidemia     Atorvastatin 40 mg by mouth every HS          Insomnia     Trazodone 200 mg by mouth every HS         Diabetes mellitus type 2, uncomplicated (CMS/HCC)     Ozempic 0.25 mg subcutaneous weekly          Neuropathy     Lyrica 200 mg by mouth TID          Chronic obstructive pulmonary disease (CMS/HCC)     Albuterol 90 mcg two inhalations every 4 hrs as needed          Physical deconditioning     PT         Left foot infection     Zosyn 3.375 grams IV every 6 hrs (Stop date 7/29/2023)   Dr Guillen apt 8/2/2023   Per client initial surgery 5/9/2023- bone spur                HPI: Client was admitted at [facility] from 7/1/2023- 7/28/2023 with the final diagnosis of Left foot infection. Non adherence issues RT OP treatment. Left foot culture- polymicrobial. ID consulted and reccommended 14 days of Zosyn. BC (-). Client had right inguinal pain, Pelvic US showed mesh. Client pending follow up apt with Dr Guillen (Podiatry) 8/2/2023. Client denies HA, dizziness, or lightheadedness. Denies CP, SOB, Cough, or increased edema. RA. Denies abdominal pain, N/V, or diarrhea. C/O constipation. States he has not had a bowel movement in 3 weeks. Denies dysuria.  Denies change in appetite. C/O intermittent right groin pain.     Review of Systems  ROS as described in in HPI     Physical Exam  Constitutional:       Comments: Sitting at bedside visiting with family    HENT:      Mouth/Throat:      Mouth: Mucous membranes are moist.   Cardiovascular:      Rate and Rhythm: Normal rate.   Pulmonary:      Effort: Pulmonary effort is normal.      Breath sounds: Normal breath sounds.      Comments: RA  Abdominal:      General: Bowel sounds are normal. There is distension.   Genitourinary:     Comments: Denies dysuria   Musculoskeletal:      Cervical back: Neck supple.      Comments: PT/OT   Skin:     General: Skin is warm and dry.      Comments: Left foot dressing D/I   RUE PICC line intact    Neurological:      Mental Status: He is alert. Mental status is at baseline.   Psychiatric:         Mood and Affect: Mood normal.      Comments: Conversational

## 2023-07-29 NOTE — ASSESSMENT & PLAN NOTE
Zosyn 3.375 grams IV every 6 hrs (Stop date 7/29/2023)   Dr Guillen apt 8/2/2023   Per client initial surgery 5/9/2023- bone spur

## 2023-08-01 ENCOUNTER — TELEPHONE (OUTPATIENT)
Dept: PRIMARY CARE | Facility: CLINIC | Age: 53
End: 2023-08-01
Payer: MEDICARE

## 2023-08-01 NOTE — TELEPHONE ENCOUNTER
Pt was recently admitted and discharged from hospital for cellulitis.  just wanted to let you know. Has appt with you on 8/14

## 2023-08-10 RX ORDER — METHOCARBAMOL 750 MG/1
750 TABLET, FILM COATED ORAL
COMMUNITY
Start: 2022-03-07 | End: 2023-09-11

## 2023-08-10 RX ORDER — LEVOFLOXACIN 750 MG/1
TABLET ORAL
COMMUNITY
Start: 2023-07-14 | End: 2023-09-11 | Stop reason: ALTCHOICE

## 2023-08-10 RX ORDER — DOXYCYCLINE 100 MG/1
CAPSULE ORAL
COMMUNITY
Start: 2023-07-13 | End: 2023-11-14 | Stop reason: ALTCHOICE

## 2023-08-10 RX ORDER — MUPIROCIN 20 MG/G
OINTMENT TOPICAL
COMMUNITY
Start: 2023-07-12 | End: 2024-03-11 | Stop reason: ALTCHOICE

## 2023-08-10 NOTE — PROGRESS NOTES
Subjective   Reason for Visit: Augie Tucker is an 53 y.o. male here for a Medicare Wellness visit.     Reviewed all medications by prescribing practitioner or clinical pharmacist (such as prescriptions, OTCs, herbal therapies and supplements) and documented in the medical record.    HPI  Had CMC of left thumb done, overall doing well. Has not followed up for a while, decreased strength. Did therapy a couple days a week and was doing good. Going to go to .     Blood infection from previous foot surgery with Dr. Lr. Was septic. He is still retaining water at times. He had atb from PICC line.      Weight gain: His mom had bariatric surgery, he has a gym downstairs, doing bike and walking on treadmill. He was started on ozempic and this has helped, would like to increase dose today.       Hep C: Has been taking the medication for a month Epclusa for 12 weeks.  Following up every 12 weeks.      RLS: He is constantly shaking his legs, started about a month ago. It used to be a side effect of medications. States he has walked on the treadmill before to try an help with this.      Right shoulder and back causing him pain, going to talk to Dr. Begum about this. No specific injury, taking his ibuprofen as needed, Voltaren does not help.     Knee pain: States he has been having right knee pain, has tried knee sleeve before and this was not helping. Would like a knee brace to help.      BLE: He is having swelling, he was started on low dose Lasix to take prior to this follow up and they have not been helping. He is not able to wear his regular shoes, states he has not increased his urination. He is having low back pain also. Denies blood in urine, pain with urinating.   Gets urine done daily, he is going to ask them to check urine culture next time they do it.      ED: He tried sildenafil and this did not help much. He was switched over tadalafil 10mg is working better for him and he was would like a refill.     "  Insomnia: States that zyprexa worked for him in the past for sleeping, he has to go to bed at 9pm but he has been up all night long. Increasing his hydroxazine to 50mg today.   He was given referral for sleep study before and never followed through     GERD: Waiting for appointment with GI. Dr. Woodruff October 25th. Needing prilosec for heartburn. He states he had one about 3 years ago. He is having issues with stomach burning, he is still losing weight. inconsistent, issues with diarrhea and constipation.     Shoulder pain: Out of Voltaren, interested in something else He was seeing Dr. Begum before for his back and neck and shoulder pains. Cant use his left shoulder The steroid helped before, cant sleep on it. This could be worsening his sleep. Lidocaine patches have helped in the past. Would like another round of medrol dose pack as this has also helped.      Neck Pain: He has a follow up with Neuro. He is seeing Aleah Avalos on Aug. 11th. He states its causing \"combustion in his head\", Causing blurred vision. Still feeling like someone is standing on his chest at times. He was told that he might have bone spurrs that could be causing issues in his neck. He was having left arm numbness. States that he was told he could see neurosurgery, needing referral. He was started on naproxen last time, Its not helping, has been taking Indocin from the hospital for a week. He is out of ibuprofen, only had it for 18 days 800mg tabs, this has helped in the past.   He is having pain in the back of his neck, states he feels popping and pain up the back of his neck. Also has hand numbness and tingling. Happening daily. States that he was able to bring his carpal tunnel braces for this. He has been given ibuprofen there for pain/headache and this is not helping much. States naproxen has helped in the past.      Going to see Dr. Light. Stopped the sumatriptan and taking auto injection once per month. Needing to see new " "neurology. He now states that when he closes his eyes he is losing his balance.      Chest pains: States this has improved a lot, bp has improved also.      He had some TIA's before in the past, he was worried about this.      All other systems reviewed and negative for complaint unless stated above.     Patient Care Team:  LEBRON Mock as PCP - General (Family Medicine)  LEBRON Mock as PCP - [employer] PCP     Review of Systems   Constitutional:  Negative for chills and fever.   HENT:  Negative for congestion, ear pain and rhinorrhea.    Eyes:  Negative for discharge and redness.   Respiratory:  Negative for cough, shortness of breath and wheezing.    Cardiovascular:  Negative for chest pain and leg swelling.   Gastrointestinal:  Negative for abdominal pain, constipation, diarrhea, nausea and vomiting.   Genitourinary:  Negative for difficulty urinating, frequency and urgency.   Musculoskeletal:  Negative for gait problem.   Skin:  Negative for rash and wound.   Neurological:  Positive for dizziness. Negative for weakness and headaches.   Psychiatric/Behavioral:  Negative for confusion. The patient is not nervous/anxious.        Objective   Vitals:  /79 (BP Location: Right arm, Patient Position: Sitting, BP Cuff Size: Large adult)   Pulse 74   Ht 1.778 m (5' 10\")   Wt 115 kg (253 lb 6.4 oz)   SpO2 95%   BMI 36.36 kg/m²       Physical Exam  Vitals reviewed.   Constitutional:       Appearance: Normal appearance.   HENT:      Head: Normocephalic.      Right Ear: Tympanic membrane, ear canal and external ear normal.      Left Ear: Tympanic membrane, ear canal and external ear normal.      Nose: No rhinorrhea.      Mouth/Throat:      Mouth: Mucous membranes are moist.      Pharynx: Oropharynx is clear.   Eyes:      Pupils: Pupils are equal, round, and reactive to light.   Cardiovascular:      Rate and Rhythm: Normal rate and regular rhythm.      Pulses: Normal " pulses.   Pulmonary:      Effort: Pulmonary effort is normal.      Breath sounds: Normal breath sounds.   Abdominal:      General: Abdomen is flat. Bowel sounds are normal.      Palpations: Abdomen is soft.   Musculoskeletal:         General: Swelling present. No tenderness. Normal range of motion.      Right lower leg: No edema.      Left lower leg: No edema.   Lymphadenopathy:      Cervical: No cervical adenopathy.   Skin:     General: Skin is warm and dry.      Findings: No rash.   Neurological:      Mental Status: He is alert and oriented to person, place, and time.   Psychiatric:         Mood and Affect: Mood normal.         Behavior: Behavior normal.         Assessment/Plan   Problem List Items Addressed This Visit    None        #Fatigue   #Weight gain   blood work ordered   discussed diet and exercise   Discussed Mediterranean diet, info provided   On ozempic and this is helping     #Knee pain   Knee brace ordered   Ibuprofen for pain       #RLS  blood work ordered   discussed supportive care   if blood work normal discussed possible ropinirole     #ED  Sildenafil did not help   Continue tadalafil      #Neck pain  Xrays reviewd, degenerative changes   rx ibuprofen 800mg   Holding meloxicam     #Knee pain  Brace ordered      #Shoulder pain   established with Dr. Begum     #Migraines  #Balance issues   Referral to neuro      #Chest pains  #Anxiety  Improved   Lexapro 20mg      #Insomnia   increased hydroxyzine   Seeing Psych with community counseling  sleep study ordered      #GERD  #Abdominal pain  #Diarrhea  #Constipation  GI referral provided   refilled pantorprazole      #Edema  elevate feet as able   lasix 20mg daily x 14 days      #HTN   Blood work ordered   continue amlodipine   Starting metoprolol   Check bp   Would like bp cuff ordered      #Asthma   Continue albuterol

## 2023-08-14 ENCOUNTER — OFFICE VISIT (OUTPATIENT)
Dept: PRIMARY CARE | Facility: CLINIC | Age: 53
End: 2023-08-14
Payer: MEDICARE

## 2023-08-14 VITALS
OXYGEN SATURATION: 95 % | WEIGHT: 253.4 LBS | HEIGHT: 70 IN | SYSTOLIC BLOOD PRESSURE: 115 MMHG | BODY MASS INDEX: 36.28 KG/M2 | HEART RATE: 74 BPM | DIASTOLIC BLOOD PRESSURE: 79 MMHG

## 2023-08-14 DIAGNOSIS — M25.561 ACUTE PAIN OF RIGHT KNEE: Primary | ICD-10-CM

## 2023-08-14 DIAGNOSIS — F32.A DEPRESSION, UNSPECIFIED DEPRESSION TYPE: ICD-10-CM

## 2023-08-14 DIAGNOSIS — M54.50 CHRONIC MIDLINE LOW BACK PAIN WITHOUT SCIATICA: ICD-10-CM

## 2023-08-14 DIAGNOSIS — E11.9 TYPE 2 DIABETES MELLITUS WITHOUT COMPLICATION, WITHOUT LONG-TERM CURRENT USE OF INSULIN (MULTI): ICD-10-CM

## 2023-08-14 DIAGNOSIS — E66.2 CLASS 2 OBESITY WITH ALVEOLAR HYPOVENTILATION WITHOUT SERIOUS COMORBIDITY WITH BODY MASS INDEX (BMI) OF 36.0 TO 36.9 IN ADULT (MULTI): ICD-10-CM

## 2023-08-14 DIAGNOSIS — Z00.00 ROUTINE GENERAL MEDICAL EXAMINATION AT HEALTH CARE FACILITY: Primary | ICD-10-CM

## 2023-08-14 DIAGNOSIS — M25.561 ACUTE PAIN OF RIGHT KNEE: ICD-10-CM

## 2023-08-14 DIAGNOSIS — G89.29 CHRONIC MIDLINE LOW BACK PAIN WITHOUT SCIATICA: ICD-10-CM

## 2023-08-14 LAB
ALANINE AMINOTRANSFERASE (SGPT) (U/L) IN SER/PLAS: 16 U/L (ref 10–52)
ALBUMIN (G/DL) IN SER/PLAS: 4.2 G/DL (ref 3.4–5)
ALKALINE PHOSPHATASE (U/L) IN SER/PLAS: 103 U/L (ref 33–120)
ASPARTATE AMINOTRANSFERASE (SGOT) (U/L) IN SER/PLAS: 14 U/L (ref 9–39)
BILIRUBIN DIRECT (MG/DL) IN SER/PLAS: 0.1 MG/DL (ref 0–0.3)
BILIRUBIN TOTAL (MG/DL) IN SER/PLAS: 0.4 MG/DL (ref 0–1.2)
PROTEIN TOTAL: 7.3 G/DL (ref 6.4–8.2)

## 2023-08-14 PROCEDURE — 3044F HG A1C LEVEL LT 7.0%: CPT | Performed by: REGISTERED NURSE

## 2023-08-14 PROCEDURE — G0439 PPPS, SUBSEQ VISIT: HCPCS | Performed by: REGISTERED NURSE

## 2023-08-14 PROCEDURE — 3074F SYST BP LT 130 MM HG: CPT | Performed by: REGISTERED NURSE

## 2023-08-14 PROCEDURE — 3078F DIAST BP <80 MM HG: CPT | Performed by: REGISTERED NURSE

## 2023-08-14 PROCEDURE — 3008F BODY MASS INDEX DOCD: CPT | Performed by: REGISTERED NURSE

## 2023-08-14 RX ORDER — IBUPROFEN 800 MG/1
800 TABLET ORAL 3 TIMES DAILY
Qty: 270 TABLET | Refills: 3 | Status: SHIPPED | OUTPATIENT
Start: 2023-08-14 | End: 2023-11-14 | Stop reason: ALTCHOICE

## 2023-08-14 RX ORDER — BUPROPION HYDROCHLORIDE 450 MG/1
450 TABLET, FILM COATED, EXTENDED RELEASE ORAL EVERY MORNING
Qty: 90 TABLET | Refills: 3 | Status: SHIPPED | OUTPATIENT
Start: 2023-08-14 | End: 2024-08-13

## 2023-08-14 ASSESSMENT — ACTIVITIES OF DAILY LIVING (ADL)
TAKING_MEDICATION: INDEPENDENT
GROCERY_SHOPPING: INDEPENDENT
BATHING: INDEPENDENT
MANAGING_FINANCES: INDEPENDENT
DRESSING: INDEPENDENT
DOING_HOUSEWORK: INDEPENDENT

## 2023-08-14 ASSESSMENT — PATIENT HEALTH QUESTIONNAIRE - PHQ9
1. LITTLE INTEREST OR PLEASURE IN DOING THINGS: SEVERAL DAYS
2. FEELING DOWN, DEPRESSED OR HOPELESS: SEVERAL DAYS
SUM OF ALL RESPONSES TO PHQ9 QUESTIONS 1 AND 2: 2
10. IF YOU CHECKED OFF ANY PROBLEMS, HOW DIFFICULT HAVE THESE PROBLEMS MADE IT FOR YOU TO DO YOUR WORK, TAKE CARE OF THINGS AT HOME, OR GET ALONG WITH OTHER PEOPLE: NOT DIFFICULT AT ALL

## 2023-08-14 ASSESSMENT — ENCOUNTER SYMPTOMS
CHILLS: 0
NAUSEA: 0
DIFFICULTY URINATING: 0
DIZZINESS: 1
NERVOUS/ANXIOUS: 0
WOUND: 0
FREQUENCY: 0
DIARRHEA: 0
CONSTIPATION: 0
EYE DISCHARGE: 0
CONFUSION: 0
ABDOMINAL PAIN: 0
SHORTNESS OF BREATH: 0
DEPRESSION: 1
WEAKNESS: 0
COUGH: 0
OCCASIONAL FEELINGS OF UNSTEADINESS: 1
RHINORRHEA: 0
HEADACHES: 0
FEVER: 0
LOSS OF SENSATION IN FEET: 1
WHEEZING: 0
EYE REDNESS: 0
VOMITING: 0

## 2023-08-14 NOTE — PATIENT INSTRUCTIONS
Neurology:  Michael Bess () [phone]  Blair Wayne () [phone]  Almaz Pitts () [phone]  Abdullahi Jerez () [phone]  Jann Kirkpatrick () [phone]

## 2023-08-15 LAB
HCV PCR QUANT: NOT DETECTED IU/ML
HCV RNA, PCR LOG: NORMAL LOG10 IU/ML

## 2023-08-30 ENCOUNTER — TELEPHONE (OUTPATIENT)
Dept: PRIMARY CARE | Facility: CLINIC | Age: 53
End: 2023-08-30
Payer: MEDICARE

## 2023-08-30 NOTE — TELEPHONE ENCOUNTER
"Augie called this morning. He is currently in [facility] and has been for 3 days now due to \"near syncope\". He is frustrated at [facility] because he is being told tests are coming back negative with no answers as to what is going on. He would like your opinion on rather he should stay at [facility] until he is discharged or be moved to somewhere that is ?   I pulled notes from [facility] into his chart.     Note to self- Room phone- [phone]       "

## 2023-08-30 NOTE — TELEPHONE ENCOUNTER
That is completely up to him. If he is not getting any answers and would like a second opinion he can ask to be transferred or he might have to sign out ama and go to a  facility. I don't have admitting privileges anywhere to do this.

## 2023-09-03 DIAGNOSIS — G25.81 RESTLESS LEGS SYNDROME: ICD-10-CM

## 2023-09-05 RX ORDER — ROPINIROLE 0.25 MG/1
TABLET, FILM COATED ORAL
Qty: 30 TABLET | Refills: 2 | Status: SHIPPED | OUTPATIENT
Start: 2023-09-05 | End: 2023-09-11 | Stop reason: ALTCHOICE

## 2023-09-05 RX ORDER — HYDROXYZINE PAMOATE 50 MG/1
CAPSULE ORAL
COMMUNITY
Start: 2023-08-24 | End: 2023-09-11 | Stop reason: WASHOUT

## 2023-09-07 VITALS — BODY MASS INDEX: 35.85 KG/M2 | WEIGHT: 250.44 LBS | HEIGHT: 70 IN

## 2023-09-07 LAB
AMPHETAMINE (PRESENCE) IN URINE BY SCREEN METHOD: NORMAL
BARBITURATES PRESENCE IN URINE BY SCREEN METHOD: NORMAL
BENZODIAZEPINE (PRESENCE) IN URINE BY SCREEN METHOD: NORMAL
CANNABINOIDS IN URINE BY SCREEN METHOD: NORMAL
COCAINE (PRESENCE) IN URINE BY SCREEN METHOD: NORMAL
DRUG SCREEN COMMENT URINE: NORMAL
FENTANYL URINE: NORMAL
OPIATES (PRESENCE) IN URINE BY SCREEN METHOD: NORMAL
OXYCODONE (PRESENCE) IN URINE BY SCREEN METHOD: NORMAL
PHENCYCLIDINE (PRESENCE) IN URINE BY SCREEN METHOD: NORMAL

## 2023-09-08 PROBLEM — L03.115 CELLULITIS OF RIGHT FOOT: Status: RESOLVED | Noted: 2023-07-14 | Resolved: 2023-09-08

## 2023-09-08 PROBLEM — R55 SYNCOPE AND COLLAPSE: Status: ACTIVE | Noted: 2023-08-28

## 2023-09-08 NOTE — PROGRESS NOTES
"Subjective   Patient ID: Augie Tucker is a 53 y.o. male who presents for Hospital Follow-up (He stats he's not going very good; seeing Jenni Inman tomorrow; ).    HPI     HFU: He had sepsis from his foot infection and had a PICC placed and was at the nursing home. He started to not feel good.   He was leaving work and passed out. He is having dizzy spells at times. Feels like his legs \"don't know how to walk.\" He is having skin sensitivity. Lost weight, had no appetite recently, he is also on the ozempic. Having sinus headaches    Seeing mayra foster For pain management     Per hospital notes:  Syncope-etiology uncertain. No obvious arrhythmia noted on telemetry or EKG. Orthostatic vitals negative. CT brain negative. Carotid ultrasound with no significant carotid artery stenosis. No further events observed in the hospital.  Hypoxia-nocturnal or while sleeping. Likely secondary to sleep apnea. On room air. Desat study, outpatient follow-up for sleep study. CT PE negative  Asthma-continue bronchodilators  FLO-noncompliant with CPAP He has an appointment with FLACO King  He is having issues with not breathing right.     Left shoulder pain s/p fall-MRI with possible greater tuberosity fracture. Also high-grade articular sided partial-thickness to full-thickness fissure at the junction of the supraspinatus and infraspinatus tendon insertions. Start Norco as needed, consult orthopedist. Sling for left arm ordered.    All other systems reviewed and negative for complaint unless stated above.      Review of Systems    Objective   /80 (BP Location: Right arm, Patient Position: Sitting, BP Cuff Size: Large adult)   Pulse 86   Ht 1.778 m (5' 10\")   Wt 109 kg (239 lb 9.6 oz)   SpO2 93%   BMI 34.38 kg/m²     Physical Exam  Vitals reviewed.   Constitutional:       Appearance: Normal appearance.   HENT:      Head: Normocephalic.      Right Ear: Tympanic membrane, ear canal and external ear normal.      Left Ear: " Tympanic membrane, ear canal and external ear normal.      Nose: No rhinorrhea.      Mouth/Throat:      Mouth: Mucous membranes are moist.      Pharynx: Oropharynx is clear.   Eyes:      Pupils: Pupils are equal, round, and reactive to light.   Cardiovascular:      Rate and Rhythm: Normal rate and regular rhythm.      Pulses: Normal pulses.   Pulmonary:      Effort: Pulmonary effort is normal.      Breath sounds: Normal breath sounds.   Abdominal:      General: Abdomen is flat. Bowel sounds are normal.      Palpations: Abdomen is soft.   Musculoskeletal:         General: Tenderness and signs of injury present.      Right lower leg: No edema.      Left lower leg: No edema.      Comments: Injury to left shoulder    Lymphadenopathy:      Cervical: No cervical adenopathy.   Skin:     General: Skin is warm and dry.      Findings: No rash.   Neurological:      Mental Status: He is alert and oriented to person, place, and time.   Psychiatric:         Mood and Affect: Mood normal.         Behavior: Behavior normal.         Assessment/Plan       #Hospital follow up  Stopping amlodipine   Leg swelling   Check bp at home and bring to follow up   Continue hydrochlorothiazide and metoprolol     #depression  #Anxiety   Titrate off duloxetine   Checking to see if he is taking lexapro     #Insomnia   Not taking hydroxyzine anymore   Doing well with trazodone     #Shoulder injury   Follow up with Dr. Centeno about shoulder   Continue supportive care     #COPD  #Asthma   #Hypoxia   Has appointment with Jenni Inman tomorrow     #Constipation   Rx colace daily   Encouraged high fiber     #GERD  Continue pantoprazole

## 2023-09-11 ENCOUNTER — OFFICE VISIT (OUTPATIENT)
Dept: PRIMARY CARE | Facility: CLINIC | Age: 53
End: 2023-09-11
Payer: MEDICARE

## 2023-09-11 VITALS
HEIGHT: 70 IN | HEART RATE: 86 BPM | OXYGEN SATURATION: 93 % | BODY MASS INDEX: 34.3 KG/M2 | SYSTOLIC BLOOD PRESSURE: 120 MMHG | WEIGHT: 239.6 LBS | DIASTOLIC BLOOD PRESSURE: 80 MMHG

## 2023-09-11 DIAGNOSIS — F41.9 ANXIETY: ICD-10-CM

## 2023-09-11 DIAGNOSIS — K59.04 CHRONIC IDIOPATHIC CONSTIPATION: ICD-10-CM

## 2023-09-11 DIAGNOSIS — G43.719 INTRACTABLE CHRONIC MIGRAINE WITHOUT AURA AND WITHOUT STATUS MIGRAINOSUS: ICD-10-CM

## 2023-09-11 DIAGNOSIS — E78.5 HYPERLIPIDEMIA, UNSPECIFIED HYPERLIPIDEMIA TYPE: Primary | ICD-10-CM

## 2023-09-11 LAB — PREGABALIN,URINE: 366.1 UG/ML

## 2023-09-11 PROCEDURE — 3074F SYST BP LT 130 MM HG: CPT | Performed by: REGISTERED NURSE

## 2023-09-11 PROCEDURE — 3079F DIAST BP 80-89 MM HG: CPT | Performed by: REGISTERED NURSE

## 2023-09-11 PROCEDURE — 3008F BODY MASS INDEX DOCD: CPT | Performed by: REGISTERED NURSE

## 2023-09-11 PROCEDURE — 3044F HG A1C LEVEL LT 7.0%: CPT | Performed by: REGISTERED NURSE

## 2023-09-11 PROCEDURE — 99214 OFFICE O/P EST MOD 30 MIN: CPT | Performed by: REGISTERED NURSE

## 2023-09-11 RX ORDER — SUMATRIPTAN SUCCINATE 100 MG/1
100 TABLET ORAL ONCE AS NEEDED
Qty: 9 TABLET | Refills: 1 | Status: SHIPPED | OUTPATIENT
Start: 2023-09-11 | End: 2023-10-25

## 2023-09-11 RX ORDER — DULOXETIN HYDROCHLORIDE 20 MG/1
20 CAPSULE, DELAYED RELEASE ORAL 2 TIMES DAILY
Qty: 60 CAPSULE | Refills: 0 | Status: SHIPPED | OUTPATIENT
Start: 2023-09-11 | End: 2023-10-04

## 2023-09-11 RX ORDER — DOCUSATE SODIUM 100 MG/1
100 CAPSULE, LIQUID FILLED ORAL 2 TIMES DAILY PRN
Qty: 60 CAPSULE | Refills: 2 | Status: SHIPPED | OUTPATIENT
Start: 2023-09-11 | End: 2023-11-10

## 2023-09-11 NOTE — PATIENT INSTRUCTIONS
Neurology:  Michael Bess () [phone]  Blair Gale () [phone]  Almaz Pitts () [phone]  Abdullahi Jerez () [phone]  Jann Kirkpatrick () [phone]

## 2023-09-14 NOTE — H&P
History & Physical Reviewed:   I have reviewed the History and Physical dated:  01-May-2023   History and Physical reviewed and relevant findings noted. Patient examined to review pertinent physical  findings.: Significant findings noted below   Findings: Has not finsihed Hep C treatment Epclusa.  States he has about one month left. Had right UE tattoos retouched 4 days ago. Otherwise no changes.   Home Medications Reviewed: no changes noted   Allergies Reviewed: no changes noted       ERAS (Enhanced Recovery After Surgery):  ·  ERAS Patient: no     Consent:   COVID-19 Consent:  ·  COVID-19 Risk Consent Surgeon has reviewed key risks related to the risk of abhishek COVID-19 and if they contract COVID-19 what the risks are.       Electronic Signatures:  Augie Lr (RAYA)   (Signed 13-Jun-2023 08:45)   Co-Signer: History & Physical Reviewed, Note Completion, ERAS, Consent  Misty Jama (PAC)   (Signed 09-May-2023 11:58)   Entered: History & Physical Reviewed, Note Completion, ERAS, Consent   Authored: History & Physical Reviewed, ERAS, Consent, Note Completion    Last Updated: 13-Jun-2023 08:45 by Augie Lr (RAYA)

## 2023-10-02 NOTE — OP NOTE
Post Operative Note:     PreOp Diagnosis: M77.32   Post-Procedure Diagnosis: Same   Procedure: 1. Calcaneal ostectomy  2. Plantar fascia release, left  3. ANTON drain  4.   5.   Surgeon: Dr. Lr   Resident/Fellow/Other Assistant: Padmini Dutton SA   Anesthesia: General   I.V. Fluids: LR   Estimated Blood Loss (mL): minimal   Specimen: yes   Complications: None   Findings: .   Patient Returned To/Condition: Patient returned to  PACU in stable condition.       Electronic Signatures:  Misty Jama (PAC)  (Signed 09-May-2023 12:16)   Authored: Post Operative Note, Note Completion      Last Updated: 09-May-2023 12:16 by Misty Jama (PAC)

## 2023-10-04 DIAGNOSIS — F41.9 ANXIETY: ICD-10-CM

## 2023-10-04 RX ORDER — FLUTICASONE FUROATE, UMECLIDINIUM BROMIDE AND VILANTEROL TRIFENATATE 100; 62.5; 25 UG/1; UG/1; UG/1
1 POWDER RESPIRATORY (INHALATION)
COMMUNITY
Start: 2023-09-12 | End: 2024-02-20

## 2023-10-04 RX ORDER — SUCRALFATE 1 G/1
1 TABLET ORAL EVERY 12 HOURS
COMMUNITY
Start: 2023-02-28 | End: 2023-10-06

## 2023-10-04 RX ORDER — ERENUMAB-AOOE 70 MG/ML
INJECTION SUBCUTANEOUS
COMMUNITY
Start: 2023-09-11 | End: 2023-11-14 | Stop reason: ALTCHOICE

## 2023-10-04 RX ORDER — ACETAMINOPHEN AND CODEINE PHOSPHATE 300; 30 MG/1; MG/1
TABLET ORAL
COMMUNITY
Start: 2023-09-21 | End: 2023-11-14 | Stop reason: ALTCHOICE

## 2023-10-04 RX ORDER — AMLODIPINE BESYLATE 10 MG/1
TABLET ORAL
COMMUNITY
Start: 2023-10-04 | End: 2024-03-11 | Stop reason: ALTCHOICE

## 2023-10-04 RX ORDER — DULOXETIN HYDROCHLORIDE 20 MG/1
CAPSULE, DELAYED RELEASE ORAL
Qty: 60 CAPSULE | Refills: 0 | Status: SHIPPED | OUTPATIENT
Start: 2023-10-04 | End: 2023-10-25

## 2023-10-04 RX ORDER — HYDROXYZINE PAMOATE 50 MG/1
CAPSULE ORAL
COMMUNITY
Start: 2023-10-04 | End: 2024-03-11 | Stop reason: ALTCHOICE

## 2023-10-04 RX ORDER — ROPINIROLE 0.25 MG/1
TABLET, FILM COATED ORAL
COMMUNITY
Start: 2023-09-11 | End: 2023-10-20 | Stop reason: SDUPTHER

## 2023-10-06 DIAGNOSIS — F51.01 PRIMARY INSOMNIA: ICD-10-CM

## 2023-10-06 DIAGNOSIS — K21.9 GASTROESOPHAGEAL REFLUX DISEASE, UNSPECIFIED WHETHER ESOPHAGITIS PRESENT: ICD-10-CM

## 2023-10-06 RX ORDER — TRAZODONE HYDROCHLORIDE 100 MG/1
200 TABLET ORAL NIGHTLY
Qty: 180 TABLET | Refills: 3 | Status: SHIPPED | OUTPATIENT
Start: 2023-10-06 | End: 2024-10-05

## 2023-10-06 RX ORDER — SUCRALFATE 1 G/1
1 TABLET ORAL
Qty: 180 TABLET | Refills: 3 | Status: SHIPPED | OUTPATIENT
Start: 2023-10-06 | End: 2023-10-20 | Stop reason: SDUPTHER

## 2023-10-15 ENCOUNTER — CLINICAL SUPPORT (OUTPATIENT)
Dept: SLEEP MEDICINE | Facility: CLINIC | Age: 53
End: 2023-10-15
Payer: MEDICARE

## 2023-10-15 DIAGNOSIS — G47.33 OBSTRUCTIVE SLEEP APNEA (ADULT) (PEDIATRIC): ICD-10-CM

## 2023-10-15 PROCEDURE — 95810 POLYSOM 6/> YRS 4/> PARAM: CPT | Performed by: PSYCHIATRY & NEUROLOGY

## 2023-10-16 ENCOUNTER — APPOINTMENT (OUTPATIENT)
Dept: PULMONOLOGY | Facility: CLINIC | Age: 53
End: 2023-10-16
Payer: MEDICARE

## 2023-10-16 ASSESSMENT — SLEEP AND FATIGUE QUESTIONNAIRES
HOW LIKELY ARE YOU TO NOD OFF OR FALL ASLEEP WHILE SITTING AND TALKING TO SOMEONE: WOULD NEVER DOZE
HOW LIKELY ARE YOU TO NOD OFF OR FALL ASLEEP WHILE SITTING AND READING: MODERATE CHANCE OF DOZING
HOW LIKELY ARE YOU TO NOD OFF OR FALL ASLEEP WHILE SITTING QUIETLY AFTER LUNCH WITHOUT ALCOHOL: WOULD NEVER DOZE
SITING INACTIVE IN A PUBLIC PLACE LIKE A CLASS ROOM OR A MOVIE THEATER: WOULD NEVER DOZE
HOW LIKELY ARE YOU TO NOD OFF OR FALL ASLEEP WHILE WATCHING TV: HIGH CHANCE OF DOZING
ESS-CHAD TOTAL SCORE: 7
HOW LIKELY ARE YOU TO NOD OFF OR FALL ASLEEP WHILE LYING DOWN TO REST IN THE AFTERNOON WHEN CIRCUMSTANCES PERMIT: WOULD NEVER DOZE
HOW LIKELY ARE YOU TO NOD OFF OR FALL ASLEEP WHEN YOU ARE A PASSENGER IN A CAR FOR AN HOUR WITHOUT A BREAK: MODERATE CHANCE OF DOZING
HOW LIKELY ARE YOU TO NOD OFF OR FALL ASLEEP IN A CAR, WHILE STOPPED FOR A FEW MINUTES IN TRAFFIC: WOULD NEVER DOZE

## 2023-10-16 NOTE — PROGRESS NOTES
[facility] TECH NOTE:     Patient: Augie Tucker   MRN//AGE: [id number]  1970  53 y.o.   Technologist: Elena Plasencia   Room: 107   Service Date: 10/16/2023        Sleep Testing Location: [facility]:     TECHNOLOGIST SLEEP STUDY PROCEDURE NOTE:   This sleep study is being conducted according to the policies and procedures outlined by the AAS accreditation standards.  The sleep study procedure and processes involved during this appointment was explained to the patient/patient’s family, questions were answered. The patient/family verbalized understanding.    The patient is a 53 y.o. year old male scheduled for aDiagnostic PSG Split night.  he arrived for his appointment.      The study that was ultimately completed was a PSG.     The full study Was completed.  Patient questionnaires completed?: yes     Consents signed? yes    Initial Fall Risk Screening:     Augie DOWD has not fallen in the last 6 months. his did not result in injury. Augie DOWD does not have a fear of falling. He does not need assistance with sitting, standing, or walking. he does not need assistance walking in his home. he does not need assistance in an unfamiliar setting. The patient is notusing an assistive device.     Brief Study observations:  53 year old male presents for a split night study, criteria was not met to split.  Patients O2 dropped down to the lower 80s for more than 5 minutes, and was started on 2L of O2.  Patients O2 was increased to 3L due to still being under 88% for more than 5 minutes.  Patient did not wake to use the restroom during the night      Deviation to order/protocol and reason: NO     If PAP, which was preferred mask/pressure/mode: NA     Other: The on call Dr. Borden was called to get an order to start patient on O2.      After the procedure, the patient/family was informed to ensure followup with ordering clinician for testing results.      Technologist: Elena Plasencia

## 2023-10-20 DIAGNOSIS — G25.81 RESTLESS LEGS SYNDROME: Primary | ICD-10-CM

## 2023-10-20 DIAGNOSIS — K21.9 GASTROESOPHAGEAL REFLUX DISEASE, UNSPECIFIED WHETHER ESOPHAGITIS PRESENT: ICD-10-CM

## 2023-10-20 DIAGNOSIS — M54.50 CHRONIC MIDLINE LOW BACK PAIN WITHOUT SCIATICA: Primary | ICD-10-CM

## 2023-10-20 DIAGNOSIS — G89.29 CHRONIC MIDLINE LOW BACK PAIN WITHOUT SCIATICA: Primary | ICD-10-CM

## 2023-10-20 RX ORDER — ROPINIROLE 0.5 MG/1
0.5 TABLET, FILM COATED ORAL DAILY
Qty: 90 TABLET | Refills: 3 | Status: SHIPPED | OUTPATIENT
Start: 2023-10-20 | End: 2024-10-19

## 2023-10-20 RX ORDER — MELOXICAM 15 MG/1
15 TABLET ORAL
COMMUNITY
End: 2023-10-20 | Stop reason: SDUPTHER

## 2023-10-20 RX ORDER — RIZATRIPTAN BENZOATE 10 MG/1
10 TABLET ORAL
COMMUNITY
Start: 2023-10-06

## 2023-10-20 RX ORDER — SUCRALFATE 1 G/1
1 TABLET ORAL
Qty: 180 TABLET | Refills: 3 | Status: SHIPPED | OUTPATIENT
Start: 2023-10-20 | End: 2024-10-19

## 2023-10-20 RX ORDER — MELOXICAM 15 MG/1
15 TABLET ORAL
Qty: 30 TABLET | Refills: 0 | Status: SHIPPED | OUTPATIENT
Start: 2023-10-20 | End: 2023-11-13

## 2023-10-20 NOTE — TELEPHONE ENCOUNTER
Pt states you discontinued his ropinrole but would you restart it since his legs are shaking still and if so can he get a refill. Along with continuing his meloxicam and Carafate.

## 2023-10-25 DIAGNOSIS — G43.719 INTRACTABLE CHRONIC MIGRAINE WITHOUT AURA AND WITHOUT STATUS MIGRAINOSUS: ICD-10-CM

## 2023-10-25 DIAGNOSIS — F41.9 ANXIETY: ICD-10-CM

## 2023-10-25 RX ORDER — DULOXETIN HYDROCHLORIDE 20 MG/1
CAPSULE, DELAYED RELEASE ORAL
Qty: 60 CAPSULE | Refills: 0 | Status: SHIPPED | OUTPATIENT
Start: 2023-10-25 | End: 2023-11-14 | Stop reason: SDUPTHER

## 2023-10-25 RX ORDER — SUMATRIPTAN SUCCINATE 100 MG/1
TABLET ORAL
Qty: 9 TABLET | Refills: 1 | Status: SHIPPED | OUTPATIENT
Start: 2023-10-25 | End: 2024-01-17 | Stop reason: WASHOUT

## 2023-11-08 ENCOUNTER — APPOINTMENT (OUTPATIENT)
Dept: NEUROLOGY | Facility: CLINIC | Age: 53
End: 2023-11-08
Payer: MEDICARE

## 2023-11-09 ENCOUNTER — HOSPITAL ENCOUNTER (OUTPATIENT)
Dept: RESPIRATORY THERAPY | Facility: HOSPITAL | Age: 53
End: 2023-11-09
Payer: MEDICARE

## 2023-11-09 NOTE — PROGRESS NOTES
Subjective   Patient ID: Augie Tucker is a 53 y.o. male who presents for Follow-up (3 months; ingrown toenails).    HPI     Ingrown Toenails: He has thick nails, about 10 years ago he needed them removed. Bilateral great toes      Had [facility] of left thumb done, overall doing well. Has not followed up for a while, decreased strength. Did therapy a couple days a week and was doing good. Going to go to .      Blood infection from previous foot surgery with Dr. Lr. Was septic. He is still retaining water at times. He had atb from PICC line.      Weight gain: His mom had bariatric surgery, he has a gym downstairs, doing bike and walking on treadmill. He was started on ozempic and this has helped, would like to increase dose today.       Hep C: Has been taking the medication for a month Epclusa for 12 weeks.  Following up every 12 weeks.      RLS: He is constantly shaking his legs, started about a month ago. It used to be a side effect of medications. States he has walked on the treadmill before to try an help with this.      Right shoulder and back causing him pain, going to talk to Dr. Begum about this. No specific injury, taking his ibuprofen as needed, Voltaren does not help.      Knee pain: States he has been having right knee pain, has tried knee sleeve before and this was not helping. Would like a knee brace to help.      BLE: He is having swelling, he was started on low dose Lasix to take prior to this follow up and they have not been helping. He is not able to wear his regular shoes, states he has not increased his urination. He is having low back pain also. Denies blood in urine, pain with urinating.   Gets urine done daily, he is going to ask them to check urine culture next time they do it.      ED: He tried sildenafil and this did not help much. He was switched over tadalafil 10mg is working better for him and he was would like a refill.      Insomnia: States that zyprexa worked for him in the past  "for sleeping, he has to go to bed at 9pm but he has been up all night long. Increasing his hydroxazine to 50mg today.   He was given referral for sleep study before and never followed through     GERD: Waiting for appointment with GI. Dr. Woodruff October 25th. Needing prilosec for heartburn. He states he had one about 3 years ago. He is having issues with stomach burning, he is still losing weight. inconsistent, issues with diarrhea and constipation.     Shoulder pain: Out of Voltaren, interested in something else He was seeing Dr. Begum before for his back and neck and shoulder pains. Cant use his left shoulder The steroid helped before, cant sleep on it. This could be worsening his sleep. Lidocaine patches have helped in the past. Would like another round of medrol dose pack as this has also helped.      Neck Pain: He has a follow up with Neuro. He is seeing Aleah Avalos on Aug. 11th. He states its causing \"combustion in his head\", Causing blurred vision. Still feeling like someone is standing on his chest at times. He was told that he might have bone spurrs that could be causing issues in his neck. He was having left arm numbness. States that he was told he could see neurosurgery, needing referral. He was started on naproxen last time, Its not helping, has been taking Indocin from the hospital for a week. He is out of ibuprofen, only had it for 18 days 800mg tabs, this has helped in the past.   He is having pain in the back of his neck, states he feels popping and pain up the back of his neck. Also has hand numbness and tingling. Happening daily. States that he was able to bring his carpal tunnel braces for this. He has been given ibuprofen there for pain/headache and this is not helping much. States naproxen has helped in the past.      Going to see  With [facility]. Dr. Batista Stopped the sumatriptan and taking auto injection once per month. Needing to see new neurology. He now states that when he closes his eyes " "he is losing his balance.      Chest pains: States this has improved a lot, bp has improved also.      He had some TIA's before in the past, he was worried about this.      All other systems reviewed and negative for complaint unless stated above.     Review of Systems    Objective   BP (!) 138/101 (BP Location: Right arm, Patient Position: Sitting, BP Cuff Size: Large adult)   Pulse 67   Ht 1.778 m (5' 10\")   Wt 102 kg (224 lb)   BMI 32.14 kg/m²     Physical Exam    Assessment/Plan        #Ingrown toenail   Referral for podiatry          #Fatigue   #Weight gain   blood work ordered   discussed diet and exercise   Discussed Mediterranean diet, info provided   On ozempic and this is helping      #Knee pain   Knee brace ordered   Ibuprofen for pain       #RLS  blood work ordered   discussed supportive care   if blood work normal discussed possible ropinirole     #ED  Sildenafil did not help   Continue tadalafil      #Neck pain  Xrays reviewd, degenerative changes   rx ibuprofen 800mg   Holding meloxicam      #Knee pain  Brace ordered      #Shoulder pain   established with Dr. Begum     #Migraines  #Balance issues   Referral to neuro      #Chest pains  #Anxiety  Improved   Lexapro 20mg      #Insomnia   increased hydroxyzine   Seeing Psych with community counseling  sleep study ordered   Dr. Brock   Increase duloxetine today      #GERD  #Abdominal pain  #Diarrhea  #Constipation  GI referral provided   refilled pantorprazole      #Edema  Stopping amlodipine   Leg swelling      #HTN   Check bp   Would like bp cuff ordered   Check bp at home and bring to follow up   Continue hydrochlorothiazide and metoprolol     #Asthma   Continue albuterol  "

## 2023-11-10 DIAGNOSIS — M54.50 CHRONIC MIDLINE LOW BACK PAIN WITHOUT SCIATICA: ICD-10-CM

## 2023-11-10 DIAGNOSIS — G89.29 CHRONIC MIDLINE LOW BACK PAIN WITHOUT SCIATICA: ICD-10-CM

## 2023-11-10 RX ORDER — FREMANEZUMAB-VFRM 225 MG/1.5ML
INJECTION SUBCUTANEOUS
COMMUNITY
Start: 2023-11-07

## 2023-11-10 RX ORDER — BREXPIPRAZOLE 1 MG/1
TABLET ORAL
COMMUNITY
Start: 2023-10-26

## 2023-11-13 RX ORDER — MELOXICAM 15 MG/1
15 TABLET ORAL DAILY
Qty: 30 TABLET | Refills: 0 | Status: SHIPPED | OUTPATIENT
Start: 2023-11-13 | End: 2023-12-19

## 2023-11-14 ENCOUNTER — HOSPITAL ENCOUNTER (OUTPATIENT)
Dept: RESPIRATORY THERAPY | Facility: HOSPITAL | Age: 53
Discharge: HOME | End: 2023-11-14
Payer: MEDICARE

## 2023-11-14 ENCOUNTER — OFFICE VISIT (OUTPATIENT)
Dept: PRIMARY CARE | Facility: CLINIC | Age: 53
End: 2023-11-14
Payer: MEDICARE

## 2023-11-14 VITALS
SYSTOLIC BLOOD PRESSURE: 138 MMHG | HEIGHT: 70 IN | BODY MASS INDEX: 32.07 KG/M2 | DIASTOLIC BLOOD PRESSURE: 101 MMHG | HEART RATE: 67 BPM | WEIGHT: 224 LBS

## 2023-11-14 DIAGNOSIS — N52.9 ERECTILE DYSFUNCTION, UNSPECIFIED ERECTILE DYSFUNCTION TYPE: ICD-10-CM

## 2023-11-14 DIAGNOSIS — L60.0 INGROWN TOENAIL OF BOTH FEET: Primary | ICD-10-CM

## 2023-11-14 DIAGNOSIS — I10 PRIMARY HYPERTENSION: ICD-10-CM

## 2023-11-14 DIAGNOSIS — Z12.11 COLON CANCER SCREENING: ICD-10-CM

## 2023-11-14 DIAGNOSIS — R60.9 EDEMA, UNSPECIFIED TYPE: ICD-10-CM

## 2023-11-14 DIAGNOSIS — N41.2 PROSTATE ABSCESS: ICD-10-CM

## 2023-11-14 DIAGNOSIS — J45.909 ASTHMA (HHS-HCC): ICD-10-CM

## 2023-11-14 DIAGNOSIS — L65.9 ALOPECIA: ICD-10-CM

## 2023-11-14 DIAGNOSIS — F41.9 ANXIETY: ICD-10-CM

## 2023-11-14 DIAGNOSIS — Z12.5 PROSTATE CANCER SCREENING: ICD-10-CM

## 2023-11-14 PROCEDURE — 94726 PLETHYSMOGRAPHY LUNG VOLUMES: CPT | Performed by: PEDIATRICS

## 2023-11-14 PROCEDURE — 94060 EVALUATION OF WHEEZING: CPT

## 2023-11-14 PROCEDURE — 94729 DIFFUSING CAPACITY: CPT | Performed by: PEDIATRICS

## 2023-11-14 PROCEDURE — 94060 EVALUATION OF WHEEZING: CPT | Performed by: PEDIATRICS

## 2023-11-14 PROCEDURE — 94664 DEMO&/EVAL PT USE INHALER: CPT

## 2023-11-14 PROCEDURE — 3075F SYST BP GE 130 - 139MM HG: CPT | Performed by: REGISTERED NURSE

## 2023-11-14 PROCEDURE — 3008F BODY MASS INDEX DOCD: CPT | Performed by: REGISTERED NURSE

## 2023-11-14 PROCEDURE — 3044F HG A1C LEVEL LT 7.0%: CPT | Performed by: REGISTERED NURSE

## 2023-11-14 PROCEDURE — 99214 OFFICE O/P EST MOD 30 MIN: CPT | Performed by: REGISTERED NURSE

## 2023-11-14 PROCEDURE — 94726 PLETHYSMOGRAPHY LUNG VOLUMES: CPT

## 2023-11-14 PROCEDURE — 94729 DIFFUSING CAPACITY: CPT

## 2023-11-14 PROCEDURE — 3080F DIAST BP >= 90 MM HG: CPT | Performed by: REGISTERED NURSE

## 2023-11-14 RX ORDER — TADALAFIL 20 MG/1
20 TABLET ORAL DAILY PRN
Qty: 12 TABLET | Refills: 3 | Status: SHIPPED | OUTPATIENT
Start: 2023-11-14 | End: 2024-01-17 | Stop reason: WASHOUT

## 2023-11-14 RX ORDER — DULOXETIN HYDROCHLORIDE 60 MG/1
60 CAPSULE, DELAYED RELEASE ORAL DAILY
Qty: 90 CAPSULE | Refills: 3 | Status: SHIPPED | OUTPATIENT
Start: 2023-11-14 | End: 2024-03-14 | Stop reason: SDUPTHER

## 2023-11-14 RX ORDER — FINASTERIDE 5 MG/1
5 TABLET, FILM COATED ORAL DAILY
Qty: 90 TABLET | Refills: 3 | Status: SHIPPED | OUTPATIENT
Start: 2023-11-14 | End: 2024-03-11 | Stop reason: ALTCHOICE

## 2023-11-14 RX ORDER — ALBUTEROL SULFATE 0.83 MG/ML
SOLUTION RESPIRATORY (INHALATION)
Status: DISPENSED
Start: 2023-11-14 | End: 2023-11-14

## 2023-11-20 ENCOUNTER — LAB (OUTPATIENT)
Dept: LAB | Facility: LAB | Age: 53
End: 2023-11-20
Payer: MEDICARE

## 2023-11-20 DIAGNOSIS — Z12.5 PROSTATE CANCER SCREENING: ICD-10-CM

## 2023-11-20 DIAGNOSIS — E78.5 HYPERLIPIDEMIA, UNSPECIFIED HYPERLIPIDEMIA TYPE: ICD-10-CM

## 2023-11-20 LAB
CHOLEST SERPL-MCNC: 132 MG/DL (ref 0–199)
CHOLESTEROL/HDL RATIO: 3.9
HDLC SERPL-MCNC: 33.6 MG/DL
LDLC SERPL CALC-MCNC: 74 MG/DL
NON HDL CHOLESTEROL: 98 MG/DL (ref 0–149)
TRIGL SERPL-MCNC: 124 MG/DL (ref 0–149)
VLDL: 25 MG/DL (ref 0–40)

## 2023-11-20 PROCEDURE — 84154 ASSAY OF PSA FREE: CPT

## 2023-11-20 PROCEDURE — G0103 PSA SCREENING: HCPCS

## 2023-11-20 PROCEDURE — 80061 LIPID PANEL: CPT

## 2023-11-20 PROCEDURE — 36415 COLL VENOUS BLD VENIPUNCTURE: CPT

## 2023-11-22 LAB
PSA FREE MFR SERPL: 40 %
PSA FREE SERPL-MCNC: 0.4 NG/ML
PSA SERPL IA-MCNC: 1 NG/ML (ref 0–4)

## 2023-11-29 DIAGNOSIS — R60.9 PERIPHERAL EDEMA: ICD-10-CM

## 2023-11-29 RX ORDER — FUROSEMIDE 20 MG/1
20 TABLET ORAL DAILY
Qty: 30 TABLET | Refills: 1 | Status: SHIPPED | OUTPATIENT
Start: 2023-11-29 | End: 2024-01-17 | Stop reason: SDUPTHER

## 2023-12-05 ENCOUNTER — TELEPHONE (OUTPATIENT)
Dept: PHYSICAL MEDICINE AND REHAB | Facility: CLINIC | Age: 53
End: 2023-12-05
Payer: MEDICARE

## 2023-12-05 LAB
MGC ASCENT PFT - FEV1 - POST: 4.02
MGC ASCENT PFT - FEV1 - PRE: 3.72
MGC ASCENT PFT - FEV1 - PREDICTED: 3.61
MGC ASCENT PFT - FVC - POST: 4.91
MGC ASCENT PFT - FVC - PRE: 4.58
MGC ASCENT PFT - FVC - PREDICTED: 4.56

## 2023-12-05 NOTE — ADDENDUM NOTE
Encounter addended by: Nasra Bradley, RRT on: 12/5/2023 11:18 AM   Actions taken: Charge Capture section accepted

## 2023-12-19 DIAGNOSIS — M54.50 CHRONIC MIDLINE LOW BACK PAIN WITHOUT SCIATICA: ICD-10-CM

## 2023-12-19 DIAGNOSIS — G89.29 CHRONIC MIDLINE LOW BACK PAIN WITHOUT SCIATICA: ICD-10-CM

## 2023-12-19 RX ORDER — MELOXICAM 15 MG/1
15 TABLET ORAL DAILY
Qty: 30 TABLET | Refills: 0 | Status: SHIPPED | OUTPATIENT
Start: 2023-12-19 | End: 2024-01-17 | Stop reason: SDUPTHER

## 2024-01-02 DIAGNOSIS — M54.12 CERVICAL RADICULOPATHY, CHRONIC: Primary | ICD-10-CM

## 2024-01-02 DIAGNOSIS — G62.9 NEUROPATHY: ICD-10-CM

## 2024-01-02 DIAGNOSIS — G89.18 POST-OP PAIN: Primary | ICD-10-CM

## 2024-01-02 RX ORDER — PREGABALIN 200 MG/1
200 CAPSULE ORAL 3 TIMES DAILY
Qty: 90 CAPSULE | Refills: 2 | Status: SHIPPED | OUTPATIENT
Start: 2024-01-02 | End: 2024-03-11 | Stop reason: ALTCHOICE

## 2024-01-02 RX ORDER — PREGABALIN 200 MG/1
200 CAPSULE ORAL 3 TIMES DAILY
COMMUNITY
End: 2024-01-02 | Stop reason: SDUPTHER

## 2024-01-02 NOTE — PROGRESS NOTES
OARRS has been reviewed and is consistent with prescribed medications. We considered the risks of abuse, dependence, addiction, and diversion. This medication is felt to be clinically appropriate based on documented diagnosis. Score 410

## 2024-01-17 DIAGNOSIS — G89.29 CHRONIC MIDLINE LOW BACK PAIN WITHOUT SCIATICA: ICD-10-CM

## 2024-01-17 DIAGNOSIS — M54.50 CHRONIC MIDLINE LOW BACK PAIN WITHOUT SCIATICA: ICD-10-CM

## 2024-01-17 DIAGNOSIS — R60.9 PERIPHERAL EDEMA: ICD-10-CM

## 2024-01-17 DIAGNOSIS — N52.9 ERECTILE DYSFUNCTION, UNSPECIFIED ERECTILE DYSFUNCTION TYPE: Primary | ICD-10-CM

## 2024-01-17 RX ORDER — FUROSEMIDE 20 MG/1
20 TABLET ORAL DAILY
Qty: 30 TABLET | Refills: 1 | Status: SHIPPED | OUTPATIENT
Start: 2024-01-17 | End: 2024-03-19

## 2024-01-17 RX ORDER — SILDENAFIL 100 MG/1
100 TABLET, FILM COATED ORAL DAILY PRN
Qty: 12 TABLET | Refills: 3 | Status: SHIPPED | OUTPATIENT
Start: 2024-01-17 | End: 2025-01-16

## 2024-01-17 RX ORDER — MELOXICAM 15 MG/1
15 TABLET ORAL DAILY
Qty: 30 TABLET | Refills: 0 | Status: SHIPPED | OUTPATIENT
Start: 2024-01-17 | End: 2024-02-16

## 2024-01-17 NOTE — TELEPHONE ENCOUNTER
Pt would like to be put back on viagra instead of cialis. The highest dose. And would like to increase the bupropion xl if possible.

## 2024-01-17 NOTE — TELEPHONE ENCOUNTER
[facility] also called in asking if the patient is suppose to be taking both furossemide and hydrochlorothiazide?

## 2024-01-22 ENCOUNTER — APPOINTMENT (OUTPATIENT)
Dept: RADIOLOGY | Facility: HOSPITAL | Age: 54
End: 2024-01-22
Payer: MEDICARE

## 2024-02-07 DIAGNOSIS — E11.9 TYPE 2 DIABETES MELLITUS WITHOUT COMPLICATION, UNSPECIFIED WHETHER LONG TERM INSULIN USE (MULTI): ICD-10-CM

## 2024-02-07 RX ORDER — LANCETS 33 GAUGE
EACH MISCELLANEOUS
Qty: 100 EACH | Refills: 2 | Status: SHIPPED | OUTPATIENT
Start: 2024-02-07

## 2024-02-08 ENCOUNTER — DOCUMENTATION (OUTPATIENT)
Dept: PHYSICAL MEDICINE AND REHAB | Facility: CLINIC | Age: 54
End: 2024-02-08

## 2024-02-08 NOTE — PROGRESS NOTES
The patient did not show up for his scheduled appointment today. He is free to call back and reschedule at his earliest convenience, at which point he will be reminded of our no show policy.    Aleah Begum MD  PM&R

## 2024-02-12 ENCOUNTER — PREP FOR PROCEDURE (OUTPATIENT)
Dept: SURGERY | Facility: HOSPITAL | Age: 54
End: 2024-02-12
Payer: MEDICARE

## 2024-02-12 DIAGNOSIS — T85.848A PAIN FROM IMPLANTED HARDWARE, INITIAL ENCOUNTER: Primary | ICD-10-CM

## 2024-02-13 DIAGNOSIS — T85.848A PAIN FROM IMPLANTED HARDWARE, INITIAL ENCOUNTER: Primary | ICD-10-CM

## 2024-02-15 DIAGNOSIS — G89.29 CHRONIC MIDLINE LOW BACK PAIN WITHOUT SCIATICA: ICD-10-CM

## 2024-02-15 DIAGNOSIS — M54.50 CHRONIC MIDLINE LOW BACK PAIN WITHOUT SCIATICA: ICD-10-CM

## 2024-02-16 RX ORDER — MELOXICAM 15 MG/1
15 TABLET ORAL DAILY
Qty: 30 TABLET | Refills: 0 | Status: SHIPPED | OUTPATIENT
Start: 2024-02-16 | End: 2024-03-14 | Stop reason: SDUPTHER

## 2024-02-19 DIAGNOSIS — J43.2 CENTRILOBULAR EMPHYSEMA (MULTI): Primary | ICD-10-CM

## 2024-02-20 RX ORDER — FLUTICASONE FUROATE, UMECLIDINIUM BROMIDE AND VILANTEROL TRIFENATATE 100; 62.5; 25 UG/1; UG/1; UG/1
POWDER RESPIRATORY (INHALATION)
Qty: 60 EACH | Refills: 0 | Status: SHIPPED | OUTPATIENT
Start: 2024-02-20

## 2024-03-11 ENCOUNTER — HOSPITAL ENCOUNTER (OUTPATIENT)
Dept: CARDIOLOGY | Facility: HOSPITAL | Age: 54
Discharge: HOME | End: 2024-03-11
Payer: MEDICARE

## 2024-03-11 ENCOUNTER — HOSPITAL ENCOUNTER (OUTPATIENT)
Dept: RADIOLOGY | Facility: EXTERNAL LOCATION | Age: 54
Discharge: HOME | End: 2024-03-11

## 2024-03-11 ENCOUNTER — PRE-ADMISSION TESTING (OUTPATIENT)
Dept: PREADMISSION TESTING | Facility: HOSPITAL | Age: 54
End: 2024-03-11
Payer: MEDICARE

## 2024-03-11 VITALS
HEART RATE: 93 BPM | OXYGEN SATURATION: 97 % | DIASTOLIC BLOOD PRESSURE: 84 MMHG | WEIGHT: 208.5 LBS | TEMPERATURE: 97.5 F | HEIGHT: 69 IN | BODY MASS INDEX: 30.88 KG/M2 | SYSTOLIC BLOOD PRESSURE: 118 MMHG

## 2024-03-11 DIAGNOSIS — T85.848A PAIN FROM IMPLANTED HARDWARE, INITIAL ENCOUNTER: ICD-10-CM

## 2024-03-11 DIAGNOSIS — E11.9 TYPE 2 DIABETES MELLITUS WITHOUT COMPLICATION, WITHOUT LONG-TERM CURRENT USE OF INSULIN (MULTI): ICD-10-CM

## 2024-03-11 DIAGNOSIS — Z01.818 PREOP EXAMINATION: ICD-10-CM

## 2024-03-11 DIAGNOSIS — Z01.818 PREOP EXAMINATION: Primary | ICD-10-CM

## 2024-03-11 LAB
ABO GROUP (TYPE) IN BLOOD: NORMAL
ALBUMIN SERPL BCP-MCNC: 4.3 G/DL (ref 3.4–5)
ALP SERPL-CCNC: 87 U/L (ref 33–120)
ALT SERPL W P-5'-P-CCNC: 16 U/L (ref 10–52)
ANION GAP SERPL CALC-SCNC: 14 MMOL/L (ref 10–20)
ANTIBODY SCREEN: NORMAL
APTT PPP: 32 SECONDS (ref 27–38)
AST SERPL W P-5'-P-CCNC: 15 U/L (ref 9–39)
BILIRUB SERPL-MCNC: 0.9 MG/DL (ref 0–1.2)
BUN SERPL-MCNC: 20 MG/DL (ref 6–23)
CALCIUM SERPL-MCNC: 9.5 MG/DL (ref 8.6–10.6)
CHLORIDE SERPL-SCNC: 107 MMOL/L (ref 98–107)
CO2 SERPL-SCNC: 26 MMOL/L (ref 21–32)
CREAT SERPL-MCNC: 0.76 MG/DL (ref 0.5–1.3)
EGFRCR SERPLBLD CKD-EPI 2021: >90 ML/MIN/1.73M*2
ERYTHROCYTE [DISTWIDTH] IN BLOOD BY AUTOMATED COUNT: 15.3 % (ref 11.5–14.5)
EST. AVERAGE GLUCOSE BLD GHB EST-MCNC: 108 MG/DL
GLUCOSE SERPL-MCNC: 79 MG/DL (ref 74–99)
HBA1C MFR BLD: 5.4 %
HCT VFR BLD AUTO: 44.4 % (ref 41–52)
HGB BLD-MCNC: 16.2 G/DL (ref 13.5–17.5)
INR PPP: 1.1 (ref 0.9–1.1)
MCH RBC QN AUTO: 33.7 PG (ref 26–34)
MCHC RBC AUTO-ENTMCNC: 36.5 G/DL (ref 32–36)
MCV RBC AUTO: 92 FL (ref 80–100)
NRBC BLD-RTO: 0 /100 WBCS (ref 0–0)
PLATELET # BLD AUTO: 348 X10*3/UL (ref 150–450)
POTASSIUM SERPL-SCNC: 4.5 MMOL/L (ref 3.5–5.3)
PROT SERPL-MCNC: 6.7 G/DL (ref 6.4–8.2)
PROTHROMBIN TIME: 12.7 SECONDS (ref 9.8–12.8)
RBC # BLD AUTO: 4.81 X10*6/UL (ref 4.5–5.9)
RH FACTOR (ANTIGEN D): NORMAL
SODIUM SERPL-SCNC: 142 MMOL/L (ref 136–145)
WBC # BLD AUTO: 4.4 X10*3/UL (ref 4.4–11.3)

## 2024-03-11 PROCEDURE — 93005 ELECTROCARDIOGRAM TRACING: CPT

## 2024-03-11 PROCEDURE — 36415 COLL VENOUS BLD VENIPUNCTURE: CPT

## 2024-03-11 PROCEDURE — 87081 CULTURE SCREEN ONLY: CPT

## 2024-03-11 PROCEDURE — 83036 HEMOGLOBIN GLYCOSYLATED A1C: CPT

## 2024-03-11 PROCEDURE — 93010 ELECTROCARDIOGRAM REPORT: CPT | Performed by: INTERNAL MEDICINE

## 2024-03-11 PROCEDURE — 80053 COMPREHEN METABOLIC PANEL: CPT

## 2024-03-11 PROCEDURE — 99204 OFFICE O/P NEW MOD 45 MIN: CPT | Performed by: NURSE PRACTITIONER

## 2024-03-11 PROCEDURE — 85027 COMPLETE CBC AUTOMATED: CPT

## 2024-03-11 PROCEDURE — 86901 BLOOD TYPING SEROLOGIC RH(D): CPT

## 2024-03-11 PROCEDURE — 85610 PROTHROMBIN TIME: CPT

## 2024-03-11 RX ORDER — CHLORHEXIDINE GLUCONATE 40 MG/ML
SOLUTION TOPICAL 2 TIMES DAILY
Qty: 473 ML | Refills: 0 | Status: SHIPPED | OUTPATIENT
Start: 2024-03-11 | End: 2024-03-16

## 2024-03-11 RX ORDER — CHLORHEXIDINE GLUCONATE ORAL RINSE 1.2 MG/ML
15 SOLUTION DENTAL SEE ADMIN INSTRUCTIONS
Qty: 473 ML | Refills: 0 | Status: SHIPPED | OUTPATIENT
Start: 2024-03-11

## 2024-03-11 ASSESSMENT — ENCOUNTER SYMPTOMS
CHILLS: 0
EYES NEGATIVE: 1
ROS GI COMMENTS: STOMACH CRAMPS
NECK NEGATIVE: 1
FEVER: 0
NEUROLOGICAL NEGATIVE: 1
CARDIOVASCULAR NEGATIVE: 1
CONSTITUTIONAL NEGATIVE: 1
RESPIRATORY NEGATIVE: 1
ENDOCRINE NEGATIVE: 1
MUSCULOSKELETAL NEGATIVE: 1

## 2024-03-11 ASSESSMENT — DUKE ACTIVITY SCORE INDEX (DASI)
CAN YOU DO HEAVY WORK AROUND THE HOUSE LIKE SCRUBBING FLOORS OR LIFTING AND MOVING HEAVY FURNITURE: YES
CAN YOU PARTICIPATE IN MODERATE RECREATIONAL ACTIVITIES LIKE GOLF, BOWLING, DANCING, DOUBLES TENNIS OR THROWING A BASEBALL OR FOOTBALL: YES
CAN YOU DO MODERATE WORK AROUND THE HOUSE LIKE VACUUMING, SWEEPING FLOORS OR CARRYING GROCERIES: YES
CAN YOU WALK A BLOCK OR TWO ON LEVEL GROUND: YES
CAN YOU DO LIGHT WORK AROUND THE HOUSE LIKE DUSTING OR WASHING DISHES: YES
CAN YOU CLIMB A FLIGHT OF STAIRS OR WALK UP A HILL: YES
CAN YOU TAKE CARE OF YOURSELF (EAT, DRESS, BATHE, OR USE TOILET): YES
CAN YOU RUN A SHORT DISTANCE: YES
DASI METS SCORE: 9.9
CAN YOU PARTICIPATE IN STRENOUS SPORTS LIKE SWIMMING, SINGLES TENNIS, FOOTBALL, BASKETBALL, OR SKIING: YES
CAN YOU WALK INDOORS, SUCH AS AROUND YOUR HOUSE: YES
CAN YOU DO YARD WORK LIKE RAKING LEAVES, WEEDING OR PUSHING A MOWER: YES
TOTAL_SCORE: 58.2
CAN YOU HAVE SEXUAL RELATIONS: YES

## 2024-03-11 ASSESSMENT — CHADS2 SCORE
CHADS2 SCORE: 2
AGE GREATER THAN OR EQUAL TO 75: NO
CHF: NO
HYPERTENSION: YES
AGE GREATER THAN OR EQUAL TO 75: NO
DIABETES: YES
PRIOR STROKE OR TIA OR THROMBOEMBOLISM: NO

## 2024-03-11 ASSESSMENT — LIFESTYLE VARIABLES: SMOKING_STATUS: NONSMOKER

## 2024-03-11 ASSESSMENT — PAIN - FUNCTIONAL ASSESSMENT: PAIN_FUNCTIONAL_ASSESSMENT: 0-10
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
## 2024-03-11 NOTE — PREPROCEDURE INSTRUCTIONS
NPO Instructions:    Do not eat any 10 ounces food after midnight the night before your surgery/procedure.  You may have clear liquids until TWO hours before surgery/procedure. This includes water, black tea/coffee, (no milk or cream) apple juice and electrolyte drinks (Gatorade).  You may chew gum up to TWO hours before your surgery/procedure.    Additional Instructions:     Seven/Six Days before Surgery:  We have sent a prescription for Hibiclens soap to your preferred pharmacy.  If you have not already, Please  your prescription and start using five days before surgery.  Follow the instruction sheet provided to you at your CPM/PAT appointment.  Review your medication instructions, stop indicated medications    Five Days before Surgery:  Review your medication instructions, stop indicated medications  Begin using your Hibiclens    Three Days before Surgery:  Review your medication instructions, stop indicated medications    The Day before Surgery:  Review your medication instructions, stop indicated medications  You will be contacted regarding the time of your arrival to facility and surgery time  Do not eat any food after Midnight    Day of Surgery:  Review your medication instructions, take indicated medications  If you have diabetes, please check your fasting blood sugar upon awakening.  If fasting blood sugar is <80 mg/dl, drink 100 ml of apple juice, time limit of 2 hours before  You may have clear liquids until TWO hours before surgery/procedure.  This includes water, black tea/coffee, (no milk or cream) apple juice and electrolyte drinks (Gatorade)  You may chew gum up to TWO hours before your surgery/procedure  Wear  comfortable loose fitting clothing  Do not use moisturizers, creams, lotions or perfume    Avoid herbal supplements, multivitamins and NSAIDS (non-steroidal anti-inflammatory drugs) such as Advil, Aleve, Ibuprofen, Naproxen, Excedrin, Meloxicam or Celebrex for at least 7 days prior to  surgery. May take Tylenol as needed.    Neema Lacy, MSN, NP-C, CNP  Family Nurse Practitioner  Department of Anesthesiology and Perioperative Medicine  Main phone: [phone]  Fax :[phone]  Direct: [phone]

## 2024-03-12 LAB
ATRIAL RATE: 80 BPM
P AXIS: 8 DEGREES
P OFFSET: 211 MS
P ONSET: 159 MS
PR INTERVAL: 128 MS
Q ONSET: 223 MS
QRS COUNT: 13 BEATS
QRS DURATION: 84 MS
QT INTERVAL: 396 MS
QTC CALCULATION(BAZETT): 456 MS
QTC FREDERICIA: 436 MS
R AXIS: 49 DEGREES
STAPHYLOCOCCUS SPEC CULT: ABNORMAL
T AXIS: 33 DEGREES
T OFFSET: 421 MS
VENTRICULAR RATE: 80 BPM

## 2024-03-12 PROCEDURE — 93005 ELECTROCARDIOGRAM TRACING: CPT
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
## 2024-03-14 ENCOUNTER — OFFICE VISIT (OUTPATIENT)
Dept: PHYSICAL MEDICINE AND REHAB | Facility: CLINIC | Age: 54
End: 2024-03-14
Payer: MEDICARE

## 2024-03-14 VITALS
HEIGHT: 69 IN | HEART RATE: 83 BPM | SYSTOLIC BLOOD PRESSURE: 110 MMHG | WEIGHT: 207 LBS | DIASTOLIC BLOOD PRESSURE: 79 MMHG | BODY MASS INDEX: 30.66 KG/M2 | TEMPERATURE: 97.8 F | OXYGEN SATURATION: 97 %

## 2024-03-14 DIAGNOSIS — M53.3 SACROILIAC JOINT DYSFUNCTION OF BOTH SIDES: ICD-10-CM

## 2024-03-14 DIAGNOSIS — R73.09 ELEVATED GLUCOSE LEVEL: ICD-10-CM

## 2024-03-14 DIAGNOSIS — F41.9 ANXIETY: ICD-10-CM

## 2024-03-14 DIAGNOSIS — M54.50 CHRONIC MIDLINE LOW BACK PAIN WITHOUT SCIATICA: ICD-10-CM

## 2024-03-14 DIAGNOSIS — M54.16 CHRONIC LUMBAR RADICULOPATHY: ICD-10-CM

## 2024-03-14 DIAGNOSIS — G62.9 NEUROPATHY: ICD-10-CM

## 2024-03-14 DIAGNOSIS — G56.42 COMPLEX REGIONAL PAIN SYNDROME TYPE 2 OF LEFT UPPER EXTREMITY: ICD-10-CM

## 2024-03-14 DIAGNOSIS — M54.12 CERVICAL RADICULOPATHY, CHRONIC: ICD-10-CM

## 2024-03-14 DIAGNOSIS — G54.2 CERVICAL NEUROPATHY: ICD-10-CM

## 2024-03-14 DIAGNOSIS — M75.102 ROTATOR CUFF SYNDROME OF LEFT SHOULDER: ICD-10-CM

## 2024-03-14 DIAGNOSIS — G57.21 FEMORAL NEUROPATHY OF RIGHT LOWER EXTREMITY: ICD-10-CM

## 2024-03-14 DIAGNOSIS — M79.18 MYOFASCIAL PAIN: ICD-10-CM

## 2024-03-14 DIAGNOSIS — M54.9 BACK PAIN, UNSPECIFIED BACK LOCATION, UNSPECIFIED BACK PAIN LATERALITY, UNSPECIFIED CHRONICITY: Primary | ICD-10-CM

## 2024-03-14 DIAGNOSIS — G89.29 CHRONIC MIDLINE LOW BACK PAIN WITHOUT SCIATICA: ICD-10-CM

## 2024-03-14 DIAGNOSIS — M47.817 SPONDYLOSIS OF LUMBOSACRAL REGION, UNSPECIFIED SPINAL OSTEOARTHRITIS COMPLICATION STATUS: ICD-10-CM

## 2024-03-14 DIAGNOSIS — M79.7 FIBROMYALGIA: ICD-10-CM

## 2024-03-14 DIAGNOSIS — R60.9 PERIPHERAL EDEMA: ICD-10-CM

## 2024-03-14 PROCEDURE — 1036F TOBACCO NON-USER: CPT | Performed by: PHYSICAL MEDICINE & REHABILITATION

## 2024-03-14 PROCEDURE — 99214 OFFICE O/P EST MOD 30 MIN: CPT | Performed by: PHYSICAL MEDICINE & REHABILITATION

## 2024-03-14 PROCEDURE — 3074F SYST BP LT 130 MM HG: CPT | Performed by: PHYSICAL MEDICINE & REHABILITATION

## 2024-03-14 PROCEDURE — 3078F DIAST BP <80 MM HG: CPT | Performed by: PHYSICAL MEDICINE & REHABILITATION

## 2024-03-14 PROCEDURE — 3044F HG A1C LEVEL LT 7.0%: CPT | Performed by: PHYSICAL MEDICINE & REHABILITATION

## 2024-03-14 PROCEDURE — 20553 NJX 1/MLT TRIGGER POINTS 3/>: CPT | Performed by: PHYSICAL MEDICINE & REHABILITATION

## 2024-03-14 PROCEDURE — 3008F BODY MASS INDEX DOCD: CPT | Performed by: PHYSICAL MEDICINE & REHABILITATION

## 2024-03-14 RX ORDER — MELOXICAM 15 MG/1
15 TABLET ORAL DAILY
Qty: 30 TABLET | Refills: 2 | Status: SHIPPED | OUTPATIENT
Start: 2024-03-14 | End: 2024-03-14 | Stop reason: WASHOUT

## 2024-03-14 RX ORDER — PREGABALIN 200 MG/1
200 CAPSULE ORAL 3 TIMES DAILY
Qty: 90 CAPSULE | Refills: 2 | Status: SHIPPED | OUTPATIENT
Start: 2024-03-14 | End: 2024-05-22

## 2024-03-14 RX ORDER — NORTRIPTYLINE HYDROCHLORIDE 25 MG/1
CAPSULE ORAL
Qty: 49 CAPSULE | Refills: 1 | Status: SHIPPED | OUTPATIENT
Start: 2024-03-14 | End: 2024-05-01

## 2024-03-14 RX ORDER — DULOXETIN HYDROCHLORIDE 60 MG/1
60 CAPSULE, DELAYED RELEASE ORAL DAILY
Qty: 90 CAPSULE | Refills: 3 | Status: SHIPPED | OUTPATIENT
Start: 2024-03-14 | End: 2024-06-03

## 2024-03-14 ASSESSMENT — PAIN SCALES - GENERAL: PAINLEVEL: 9

## 2024-03-14 NOTE — PATIENT INSTRUCTIONS
-Ice on and off for the next 24 hours if injection sites are sore. Do gentle range of motion exercises in each area that was injected. Try to do them every hour for about half a minute or so, in every direction that the affected part goes. No pool, bath, or hot tub today. Avoid heavy lifting for the next 2 days.    -Start nortriptyline according to up titration instructions  -Continue lyrica to 200 mg 3 times daily. Monitor for weight gain closely.   - consider other medications in the future, including restarting Robaxin  -Continue Voltaren gel as needed  -Consider referral to new pain management doctor for consideration of repeat stellate ganglion blocks, cervical and lumbar facet block/RFA  -New Cervical MRI ordered previously  - continue daily home exercises, rotator cuff exercises provided  -Consider adam chi , OMT, acupuncture  -Follow up 3-4 months or sooner if needed

## 2024-03-19 RX ORDER — BLOOD-GLUCOSE METER
EACH MISCELLANEOUS
Qty: 50 EACH | Refills: 2 | Status: SHIPPED | OUTPATIENT
Start: 2024-03-19 | End: 2025-03-19

## 2024-03-19 RX ORDER — FUROSEMIDE 20 MG/1
20 TABLET ORAL DAILY
Qty: 30 TABLET | Refills: 1 | Status: SHIPPED | OUTPATIENT
Start: 2024-03-19 | End: 2024-05-01

## 2024-03-19 RX ORDER — MELOXICAM 15 MG/1
15 TABLET ORAL DAILY
Qty: 30 TABLET | Refills: 0 | Status: SHIPPED | OUTPATIENT
Start: 2024-03-19 | End: 2024-04-01

## 2024-03-24 ENCOUNTER — ANESTHESIA EVENT (OUTPATIENT)
Dept: OPERATING ROOM | Facility: HOSPITAL | Age: 54
End: 2024-03-24
Payer: MEDICARE

## 2024-03-25 ENCOUNTER — ANESTHESIA (OUTPATIENT)
Dept: OPERATING ROOM | Facility: HOSPITAL | Age: 54
End: 2024-03-25
Payer: MEDICARE

## 2024-03-25 ENCOUNTER — HOSPITAL ENCOUNTER (OUTPATIENT)
Facility: HOSPITAL | Age: 54
Setting detail: OUTPATIENT SURGERY
Discharge: HOME | End: 2024-03-25
Attending: DENTIST | Admitting: DENTIST
Payer: MEDICARE

## 2024-03-25 VITALS
DIASTOLIC BLOOD PRESSURE: 83 MMHG | TEMPERATURE: 97 F | BODY MASS INDEX: 29.98 KG/M2 | HEIGHT: 70 IN | OXYGEN SATURATION: 92 % | HEART RATE: 82 BPM | SYSTOLIC BLOOD PRESSURE: 126 MMHG | WEIGHT: 209.44 LBS | RESPIRATION RATE: 12 BRPM

## 2024-03-25 DIAGNOSIS — T85.848A PAIN FROM IMPLANTED HARDWARE, INITIAL ENCOUNTER: ICD-10-CM

## 2024-03-25 LAB — GLUCOSE BLD MANUAL STRIP-MCNC: 106 MG/DL (ref 74–99)

## 2024-03-25 PROCEDURE — A4217 STERILE WATER/SALINE, 500 ML: HCPCS | Mod: SE | Performed by: DENTIST

## 2024-03-25 PROCEDURE — 2500000004 HC RX 250 GENERAL PHARMACY W/ HCPCS (ALT 636 FOR OP/ED): Mod: SE | Performed by: ANESTHESIOLOGIST ASSISTANT

## 2024-03-25 PROCEDURE — 3700000001 HC GENERAL ANESTHESIA TIME - INITIAL BASE CHARGE: Performed by: DENTIST

## 2024-03-25 PROCEDURE — 2500000001 HC RX 250 WO HCPCS SELF ADMINISTERED DRUGS (ALT 637 FOR MEDICARE OP): Mod: SE | Performed by: ANESTHESIOLOGY

## 2024-03-25 PROCEDURE — 3700000002 HC GENERAL ANESTHESIA TIME - EACH INCREMENTAL 1 MINUTE: Performed by: DENTIST

## 2024-03-25 PROCEDURE — 2500000005 HC RX 250 GENERAL PHARMACY W/O HCPCS: Mod: SE | Performed by: DENTIST

## 2024-03-25 PROCEDURE — 2500000004 HC RX 250 GENERAL PHARMACY W/ HCPCS (ALT 636 FOR OP/ED): Mod: SE | Performed by: DENTIST

## 2024-03-25 PROCEDURE — C1729 CATH, DRAINAGE: HCPCS | Performed by: DENTIST

## 2024-03-25 PROCEDURE — 2500000004 HC RX 250 GENERAL PHARMACY W/ HCPCS (ALT 636 FOR OP/ED): Mod: SE | Performed by: ANESTHESIOLOGY

## 2024-03-25 PROCEDURE — 88300 SURGICAL PATH GROSS: CPT | Performed by: PATHOLOGY

## 2024-03-25 PROCEDURE — 3600000008 HC OR TIME - EACH INCREMENTAL 1 MINUTE - PROCEDURE LEVEL THREE: Performed by: DENTIST

## 2024-03-25 PROCEDURE — 7100000009 HC PHASE TWO TIME - INITIAL BASE CHARGE: Performed by: DENTIST

## 2024-03-25 PROCEDURE — 7100000002 HC RECOVERY ROOM TIME - EACH INCREMENTAL 1 MINUTE: Performed by: DENTIST

## 2024-03-25 PROCEDURE — 7100000001 HC RECOVERY ROOM TIME - INITIAL BASE CHARGE: Performed by: DENTIST

## 2024-03-25 PROCEDURE — 3600000003 HC OR TIME - INITIAL BASE CHARGE - PROCEDURE LEVEL THREE: Performed by: DENTIST

## 2024-03-25 PROCEDURE — 2500000005 HC RX 250 GENERAL PHARMACY W/O HCPCS: Mod: SE | Performed by: ANESTHESIOLOGY

## 2024-03-25 PROCEDURE — 7100000010 HC PHASE TWO TIME - EACH INCREMENTAL 1 MINUTE: Performed by: DENTIST

## 2024-03-25 PROCEDURE — 82947 ASSAY GLUCOSE BLOOD QUANT: CPT

## 2024-03-25 PROCEDURE — 2500000004 HC RX 250 GENERAL PHARMACY W/ HCPCS (ALT 636 FOR OP/ED): Mod: SE

## 2024-03-25 PROCEDURE — A20680 PR REMOVAL DEEP IMPLANT: Performed by: ANESTHESIOLOGIST ASSISTANT

## 2024-03-25 PROCEDURE — 88300 SURGICAL PATH GROSS: CPT | Mod: TC,SUR,WESLAB | Performed by: DENTIST

## 2024-03-25 PROCEDURE — 88302 TISSUE EXAM BY PATHOLOGIST: CPT | Performed by: PATHOLOGY

## 2024-03-25 PROCEDURE — 2500000005 HC RX 250 GENERAL PHARMACY W/O HCPCS: Mod: SE | Performed by: ANESTHESIOLOGIST ASSISTANT

## 2024-03-25 PROCEDURE — A20680 PR REMOVAL DEEP IMPLANT: Performed by: ANESTHESIOLOGY

## 2024-03-25 PROCEDURE — 2720000007 HC OR 272 NO HCPCS: Performed by: DENTIST

## 2024-03-25 RX ORDER — MIDAZOLAM HYDROCHLORIDE 1 MG/ML
INJECTION INTRAMUSCULAR; INTRAVENOUS AS NEEDED
Status: DISCONTINUED | OUTPATIENT
Start: 2024-03-25 | End: 2024-03-25

## 2024-03-25 RX ORDER — OXYCODONE HYDROCHLORIDE 5 MG/1
5 TABLET ORAL EVERY 4 HOURS PRN
Status: DISCONTINUED | OUTPATIENT
Start: 2024-03-25 | End: 2024-03-25 | Stop reason: HOSPADM

## 2024-03-25 RX ORDER — ROCURONIUM BROMIDE 10 MG/ML
INJECTION, SOLUTION INTRAVENOUS AS NEEDED
Status: DISCONTINUED | OUTPATIENT
Start: 2024-03-25 | End: 2024-03-25

## 2024-03-25 RX ORDER — PROPOFOL 10 MG/ML
INJECTION, EMULSION INTRAVENOUS AS NEEDED
Status: DISCONTINUED | OUTPATIENT
Start: 2024-03-25 | End: 2024-03-25

## 2024-03-25 RX ORDER — HYDROMORPHONE HYDROCHLORIDE 1 MG/ML
INJECTION, SOLUTION INTRAMUSCULAR; INTRAVENOUS; SUBCUTANEOUS AS NEEDED
Status: DISCONTINUED | OUTPATIENT
Start: 2024-03-25 | End: 2024-03-25

## 2024-03-25 RX ORDER — PROPOFOL 10 MG/ML
INJECTION, EMULSION INTRAVENOUS CONTINUOUS PRN
Status: DISCONTINUED | OUTPATIENT
Start: 2024-03-25 | End: 2024-03-25

## 2024-03-25 RX ORDER — OXYCODONE HYDROCHLORIDE 5 MG/1
5 CAPSULE ORAL EVERY 6 HOURS PRN
Qty: 20 CAPSULE | Refills: 0 | Status: SHIPPED | OUTPATIENT
Start: 2024-03-25 | End: 2024-03-30

## 2024-03-25 RX ORDER — ONDANSETRON HYDROCHLORIDE 2 MG/ML
INJECTION, SOLUTION INTRAVENOUS AS NEEDED
Status: DISCONTINUED | OUTPATIENT
Start: 2024-03-25 | End: 2024-03-25

## 2024-03-25 RX ORDER — LIDOCAINE HYDROCHLORIDE AND EPINEPHRINE 10; 10 MG/ML; UG/ML
INJECTION, SOLUTION INFILTRATION; PERINEURAL AS NEEDED
Status: DISCONTINUED | OUTPATIENT
Start: 2024-03-25 | End: 2024-03-25 | Stop reason: HOSPADM

## 2024-03-25 RX ORDER — HYDROMORPHONE HYDROCHLORIDE 1 MG/ML
0.2 INJECTION, SOLUTION INTRAMUSCULAR; INTRAVENOUS; SUBCUTANEOUS EVERY 5 MIN PRN
Status: DISCONTINUED | OUTPATIENT
Start: 2024-03-25 | End: 2024-03-25 | Stop reason: HOSPADM

## 2024-03-25 RX ORDER — ACETAMINOPHEN 500 MG
500 TABLET ORAL EVERY 6 HOURS PRN
Qty: 28 TABLET | Refills: 0 | Status: SHIPPED | OUTPATIENT
Start: 2024-03-25 | End: 2024-04-01

## 2024-03-25 RX ORDER — ONDANSETRON HYDROCHLORIDE 2 MG/ML
4 INJECTION, SOLUTION INTRAVENOUS ONCE AS NEEDED
Status: DISCONTINUED | OUTPATIENT
Start: 2024-03-25 | End: 2024-03-25 | Stop reason: HOSPADM

## 2024-03-25 RX ORDER — SODIUM CHLORIDE, SODIUM LACTATE, POTASSIUM CHLORIDE, CALCIUM CHLORIDE 600; 310; 30; 20 MG/100ML; MG/100ML; MG/100ML; MG/100ML
INJECTION, SOLUTION INTRAVENOUS CONTINUOUS PRN
Status: DISCONTINUED | OUTPATIENT
Start: 2024-03-25 | End: 2024-03-25

## 2024-03-25 RX ORDER — CEFAZOLIN 1 G/1
INJECTION, POWDER, FOR SOLUTION INTRAVENOUS AS NEEDED
Status: DISCONTINUED | OUTPATIENT
Start: 2024-03-25 | End: 2024-03-25

## 2024-03-25 RX ORDER — IBUPROFEN 600 MG/1
600 TABLET ORAL EVERY 6 HOURS PRN
Qty: 28 TABLET | Refills: 0 | Status: SHIPPED | OUTPATIENT
Start: 2024-03-25 | End: 2024-04-01

## 2024-03-25 RX ORDER — SODIUM CHLORIDE 0.9 G/100ML
IRRIGANT IRRIGATION AS NEEDED
Status: DISCONTINUED | OUTPATIENT
Start: 2024-03-25 | End: 2024-03-25 | Stop reason: HOSPADM

## 2024-03-25 RX ORDER — AMOXICILLIN AND CLAVULANATE POTASSIUM 875; 125 MG/1; MG/1
1 TABLET, FILM COATED ORAL 2 TIMES DAILY
Qty: 14 TABLET | Refills: 0 | Status: SHIPPED | OUTPATIENT
Start: 2024-03-25 | End: 2024-04-01

## 2024-03-25 RX ORDER — OXYCODONE HYDROCHLORIDE 5 MG/1
5 TABLET ORAL ONCE
Status: COMPLETED | OUTPATIENT
Start: 2024-03-25 | End: 2024-03-25

## 2024-03-25 RX ORDER — LIDOCAINE HCL/PF 100 MG/5ML
SYRINGE (ML) INTRAVENOUS AS NEEDED
Status: DISCONTINUED | OUTPATIENT
Start: 2024-03-25 | End: 2024-03-25

## 2024-03-25 RX ORDER — SODIUM CHLORIDE, SODIUM LACTATE, POTASSIUM CHLORIDE, CALCIUM CHLORIDE 600; 310; 30; 20 MG/100ML; MG/100ML; MG/100ML; MG/100ML
100 INJECTION, SOLUTION INTRAVENOUS CONTINUOUS
Status: DISCONTINUED | OUTPATIENT
Start: 2024-03-25 | End: 2024-03-25 | Stop reason: HOSPADM

## 2024-03-25 RX ORDER — FENTANYL CITRATE 50 UG/ML
INJECTION, SOLUTION INTRAMUSCULAR; INTRAVENOUS AS NEEDED
Status: DISCONTINUED | OUTPATIENT
Start: 2024-03-25 | End: 2024-03-25

## 2024-03-25 RX ORDER — HYDROMORPHONE HYDROCHLORIDE 1 MG/ML
0.5 INJECTION, SOLUTION INTRAMUSCULAR; INTRAVENOUS; SUBCUTANEOUS EVERY 5 MIN PRN
Status: DISCONTINUED | OUTPATIENT
Start: 2024-03-25 | End: 2024-03-25 | Stop reason: HOSPADM

## 2024-03-25 RX ORDER — LIDOCAINE HYDROCHLORIDE 10 MG/ML
0.1 INJECTION INFILTRATION; PERINEURAL ONCE
Status: DISCONTINUED | OUTPATIENT
Start: 2024-03-25 | End: 2024-03-25 | Stop reason: HOSPADM

## 2024-03-25 RX ORDER — CHLORHEXIDINE GLUCONATE ORAL RINSE 1.2 MG/ML
15 SOLUTION DENTAL 2 TIMES DAILY
Qty: 120 ML | Refills: 0 | Status: SHIPPED | OUTPATIENT
Start: 2024-03-25

## 2024-03-25 RX ADMIN — MIDAZOLAM HYDROCHLORIDE 2 MG: 1 INJECTION, SOLUTION INTRAMUSCULAR; INTRAVENOUS at 13:28

## 2024-03-25 RX ADMIN — Medication 2 L/MIN: at 15:45

## 2024-03-25 RX ADMIN — LIDOCAINE HYDROCHLORIDE 30 MG: 20 INJECTION INTRAVENOUS at 13:36

## 2024-03-25 RX ADMIN — CEFAZOLIN 2 G: 1 INJECTION, POWDER, FOR SOLUTION INTRAMUSCULAR; INTRAVENOUS at 13:47

## 2024-03-25 RX ADMIN — ONDANSETRON 4 MG: 2 INJECTION INTRAMUSCULAR; INTRAVENOUS at 15:16

## 2024-03-25 RX ADMIN — PROPOFOL 30 MCG/KG/MIN: 10 INJECTION, EMULSION INTRAVENOUS at 14:00

## 2024-03-25 RX ADMIN — SODIUM CHLORIDE, POTASSIUM CHLORIDE, SODIUM LACTATE AND CALCIUM CHLORIDE 100 ML/HR: 600; 310; 30; 20 INJECTION, SOLUTION INTRAVENOUS at 15:50

## 2024-03-25 RX ADMIN — FENTANYL CITRATE 50 MCG: 50 INJECTION, SOLUTION INTRAMUSCULAR; INTRAVENOUS at 13:37

## 2024-03-25 RX ADMIN — HYDROMORPHONE HYDROCHLORIDE 0.3 MG: 1 INJECTION, SOLUTION INTRAMUSCULAR; INTRAVENOUS; SUBCUTANEOUS at 14:51

## 2024-03-25 RX ADMIN — DEXAMETHASONE SODIUM PHOSPHATE 10 MG: 4 INJECTION INTRA-ARTICULAR; INTRALESIONAL; INTRAMUSCULAR; INTRAVENOUS; SOFT TISSUE at 13:59

## 2024-03-25 RX ADMIN — PROPOFOL 20 MG: 10 INJECTION, EMULSION INTRAVENOUS at 15:07

## 2024-03-25 RX ADMIN — HYDROMORPHONE HYDROCHLORIDE 0.5 MG: 1 INJECTION, SOLUTION INTRAMUSCULAR; INTRAVENOUS; SUBCUTANEOUS at 15:48

## 2024-03-25 RX ADMIN — PROPOFOL 180 MG: 10 INJECTION, EMULSION INTRAVENOUS at 13:36

## 2024-03-25 RX ADMIN — OXYCODONE HYDROCHLORIDE 5 MG: 5 TABLET ORAL at 16:04

## 2024-03-25 RX ADMIN — HYDROMORPHONE HYDROCHLORIDE 0.2 MG: 1 INJECTION, SOLUTION INTRAMUSCULAR; INTRAVENOUS; SUBCUTANEOUS at 15:06

## 2024-03-25 RX ADMIN — OXYCODONE HYDROCHLORIDE 5 MG: 5 TABLET ORAL at 16:47

## 2024-03-25 RX ADMIN — ROCURONIUM BROMIDE 40 MG: 10 INJECTION INTRAVENOUS at 13:38

## 2024-03-25 RX ADMIN — HYDROMORPHONE HYDROCHLORIDE 0.5 MG: 1 INJECTION, SOLUTION INTRAMUSCULAR; INTRAVENOUS; SUBCUTANEOUS at 15:32

## 2024-03-25 RX ADMIN — SODIUM CHLORIDE, POTASSIUM CHLORIDE, SODIUM LACTATE AND CALCIUM CHLORIDE: 600; 310; 30; 20 INJECTION, SOLUTION INTRAVENOUS at 13:28

## 2024-03-25 RX ADMIN — FENTANYL CITRATE 50 MCG: 50 INJECTION, SOLUTION INTRAMUSCULAR; INTRAVENOUS at 14:31

## 2024-03-25 SDOH — HEALTH STABILITY: MENTAL HEALTH: CURRENT SMOKER: 1

## 2024-03-25 ASSESSMENT — PAIN SCALES - GENERAL
PAINLEVEL_OUTOF10: 4
PAINLEVEL_OUTOF10: 7
PAIN_LEVEL: 0
PAINLEVEL_OUTOF10: 9
PAINLEVEL_OUTOF10: 6
PAINLEVEL_OUTOF10: 9
PAINLEVEL_OUTOF10: 0 - NO PAIN
PAINLEVEL_OUTOF10: 5 - MODERATE PAIN
PAINLEVEL_OUTOF10: 3
PAINLEVEL_OUTOF10: 5 - MODERATE PAIN

## 2024-03-25 ASSESSMENT — PAIN - FUNCTIONAL ASSESSMENT
PAIN_FUNCTIONAL_ASSESSMENT: 0-10

## 2024-03-25 ASSESSMENT — COLUMBIA-SUICIDE SEVERITY RATING SCALE - C-SSRS
6. HAVE YOU EVER DONE ANYTHING, STARTED TO DO ANYTHING, OR PREPARED TO DO ANYTHING TO END YOUR LIFE?: NO
2. HAVE YOU ACTUALLY HAD ANY THOUGHTS OF KILLING YOURSELF?: NO
1. IN THE PAST MONTH, HAVE YOU WISHED YOU WERE DEAD OR WISHED YOU COULD GO TO SLEEP AND NOT WAKE UP?: NO

## 2024-03-25 ASSESSMENT — PAIN DESCRIPTION - LOCATION: LOCATION: MOUTH

## 2024-03-25 NOTE — DISCHARGE INSTRUCTIONS
BLEEDING  ° Change gauze as directed every 20-30 minutes until active bleeding has subsided (usually 2-3 hours).  ° Make sure to apply good pressure to the gauze.  ° You may remove gauze to begin drinking, but return fresh gauze to surgery sites if bleeding is still present.  ° It is normal to experience light bleeding or oozing for up to 24 hours. If there is severe bleeding follow instructions at the bottom of the form under ``Excessive Bleeding´´    HYGIENE  ° If prescribed Chlorhexidine rinse, gently rinse and spit the medication three times per day. Do not swallow the medication.    DIET  ° Do not drink through a straw.  ° Start first with clear liquids, and then gradually advance to soup, and soft foods.  ° Avoiding eating foods that are spicy, hot, tough, or chrunchy until the surgical sites have healed.    MEDICATIONS  ° Pain medication should be taken only during the time that you feel significant discomfort. If the pain is severe, the prescription medication can be used. However, if only mild discomfort is experienced, try to use a less potent, over the counter medication such as Advil (ibuprofen and/or Tylenol (Acetaminophen). These can be taken in crushed tablets or in liquid form.  ° Antibiotics: If prescribed please take antibiotics at the appropriate interval as described on the bottle. Be sure not to miss any doses and take the medicine until it is gone.    FIRST DAY AFTER SURGERY  ° Avoid using full-strength mouthwashes for 2 weeks.  ° Begin using a chlorhexidine or warm salt-water rinse (1/4 teaspoon salt in a glass of warm water) after meals for the first week.  ° Pain and swelling is normal and expected, and may last for 10-14 days. Don´t be alarmed if the third day is the worst.  ° Continue eating soft foods. You may begin to gradually return to your normal diet as tolerated. Avoid spicy foods and drinks for 2 weeks.    PLEASE REPORT ANY OF THE FOLLOWING TO OUR TEAM:  ° Sudden or excessive  bleeding, swelling, or bruising.  ° Any itching, rash, or adverse reaction to medication.  ° Fever over 100 degrees Fahrenheit.  ° Drainage or discharge from the incision sites (other than blood).  ° Any injury to the face over the next 6 weeks.    If you have any questions or concerns please contact us during regular office hours ([phone]). For any emergency or after hours questions do not hesitate to contact the resident on call. You may call [phone] for the hospital  and ask for the ``Oral Surgeon On Call´´.

## 2024-03-25 NOTE — ANESTHESIA PROCEDURE NOTES
Peripheral IV  Date/Time: 3/25/2024 1:28 PM      Placement  Needle size: 18 G  Laterality: right  Location: hand  Local anesthetic: none  Site prep: alcohol  Technique: anatomical landmarks  Attempts: 1

## 2024-03-25 NOTE — ANESTHESIA PROCEDURE NOTES
Airway  Date/Time: 3/25/2024 1:36 PM  Urgency: elective    Airway not difficult    Staffing  Performed: SUJIT   Authorized by: Ana Marai Yates MD    Performed by: SUJIT Multani  Patient location during procedure: OR    Indications and Patient Condition  Indications for airway management: anesthesia  Spontaneous Ventilation: absent  Sedation level: deep  Preoxygenated: yes  Patient position: sniffing  MILS maintained throughout  Mask difficulty assessment: 1 - vent by mask  Planned trial extubation    Final Airway Details  Final airway type: endotracheal airway      Successful airway: ETT  Cuffed: yes   Successful intubation technique: video laryngoscopy  Endotracheal tube insertion site: oral  Blade: Reyna  Blade size: #4  ETT size (mm): 7.5  Cormack-Lehane Classification: grade I - full view of glottis  Placement verified by: chest auscultation and capnometry   Measured from: lips  ETT to lips (cm): 22  Number of attempts at approach: 1

## 2024-03-25 NOTE — ANESTHESIA POSTPROCEDURE EVALUATION
Patient: Augie Tucker    Procedure Summary       Date: 03/25/24 Room / Location: [facility] OR 06 / [facility]    Anesthesia Start: 1328 Anesthesia Stop: 1540    Procedure: Removal Hardware Left mandible (Left) Diagnosis:       Pain from implanted hardware, initial encounter      (Pain from implanted hardware, initial encounter [T85.846H])    Surgeons: Willie Becerra DDS Responsible Provider: Ana Maria Yates MD    Anesthesia Type: general ASA Status: 3            Anesthesia Type: general    Vitals Value Taken Time   /88 03/25/24 1550   Temp 36.4 03/25/24 1550   Pulse 88 03/25/24 1550   Resp 16 03/25/24 1550   SpO2 98 03/25/24 1550       Anesthesia Post Evaluation    Patient location during evaluation: PACU  Patient participation: complete - patient participated  Level of consciousness: awake and alert  Pain score: 0  Pain management: satisfactory to patient  Airway patency: patent  Cardiovascular status: stable  Respiratory status: room air  Hydration status: stable  Postoperative Nausea and Vomiting: none        No notable events documented.
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
## 2024-03-25 NOTE — ANESTHESIA PREPROCEDURE EVALUATION
Patient: Augie Tucker    Procedure Information       Date/Time: 03/25/24 1200    Procedure: Removal Hardware Head (Left)    Location: [facility] OR 06 / [facility] [place]    Surgeons: Willie Becerra DDS            Relevant Problems   Cardiovascular   (+) HTN (hypertension)   (+) Hyperlipidemia      Endocrine   (+) Class 2 obesity with alveolar hypoventilation and body mass index (BMI) of 36.0 to 36.9 in adult (CMS/HCC)   (+) Diabetes mellitus type 2, uncomplicated (CMS/HCC)      GI   (+) GERD (gastroesophageal reflux disease)      /Renal   (+) Hepatic steatosis   (+) Hepatitis C, chronic (CMS/HCC)      Neuro/Psych   (+) Anxiety   (+) Cervical neuropathy   (+) Cervical radiculopathy, chronic   (+) Chronic lumbar radiculopathy   (+) Complex regional pain syndrome type 2 of left upper extremity   (+) Depression   (+) PTSD (post-traumatic stress disorder)      Pulmonary   (+) Chronic obstructive pulmonary disease (CMS/HCC)   (+) Obstructive sleep apnea      GI/Hepatic   (+) Elevated liver function tests   (+) Hepatic steatosis   (+) Hepatitis C, chronic (CMS/HCC)      Musculoskeletal   (+) Complex regional pain syndrome type 2 of left upper extremity   (+) Fibromyalgia   (+) Lumbosacral spondylosis      Infectious Disease   (+) Hepatitis C, chronic (CMS/HCC)   (+) Left foot infection      Other   (+) Arthritis of carpometacarpal (CMC) joint of both thumbs       Clinical information reviewed:    Allergies                NPO Detail:  No data recorded     Physical Exam    Airway  Mallampati: II  TM distance: <3 FB  Neck ROM: limited     Cardiovascular   Rhythm: regular  Rate: normal     Dental   (+) upper dentures, lower dentures     Pulmonary    Abdominal            Anesthesia Plan    History of general anesthesia?: yes  History of complications of general anesthesia?: no    ASA 3     general     The patient is a current smoker.    intravenous induction   Anesthetic plan and risks discussed with patient.  Use of  blood products discussed with patient who.    Plan discussed with CRNA.

## 2024-03-26 NOTE — OP NOTE
Removal Hardware Left mandible (L) Operative Note     Date: 3/25/2024  OR Location: [facility] OR    Name: Augie Tucker, : 1970, Age: 53 y.o., MRN: [id number], Sex: male    Diagnosis  Pre-op Diagnosis     * Pain from implanted hardware, initial encounter [T85.848A] Post-op Diagnosis     * Pain from implanted hardware, initial encounter [T85.848A]     Procedures  Removal Hardware Left mandible   - AK REMOVAL IMPLANT DEEP      Surgeons      * Willie Becerra - Primary    Resident/Fellow/Other Assistant:  Surgeon(s) and Role:     * Jaskaran Thomas DDS - Resident - Assisting     * Marcia Weems DDS - Resident - Assisting    Procedure Summary  Anesthesia: General  ASA: III  Anesthesia Staff: Anesthesiologist: Ana Maria Yates MD  C-AA: SUJIT Multani; SUJIT Hastings  Anesthesia Break User: SUJIT Victoria  Estimated Blood Loss: 25 mL  Intra-op Medications:   Administrations occurring from 1200 to 1445 on 24:   Medication Name Total Dose   sodium chloride 0.9 % irrigation solution 1,000 mL   lidocaine-epinephrine (Xylocaine W/EPI) 1 %-1:100,000 injection 6 mL   gelatin absorbable (Gelfoam) 100 sponge 1 each              Anesthesia Record               Intraprocedure I/O Totals          Intake    Propofol Drip 0.00 mL    The total shown is the total volume documented since Anesthesia Start was filed.    LR infusion 600.00 mL    Total Intake 600 mL          Specimen:   ID Type Source Tests Collected by Time   1 : EXPLANTED MANDIBLE HARDWARE FOR GROSS PATHOLOGY Tissue HARDWARE SURGICAL PATHOLOGY EXAM Willie Becerra DDS 3/25/2024 1502        Staff:   Circulator: Africa Blanco RN  Relief Circulator: Kimberly Christiansen RN  Relief Scrub: Natali Rae  Scrub Person: Palak Marrero Mariano         Drains and/or Catheters: * None in log *    Tourniquet Times: NA        Implants: NA    Findings: Mandibular hardware of anterior left mandibular ramus intact and well integrated into bone, however screw tips  "have eroded through medial cortex and through soft tissue.     Indications: Augie Tucker is an 53 y.o. male who is having surgery for Pain from implanted hardware, initial encounter [T85.139S]. Patient had ORIF of a left mandibular fracture approximately 20-30 years ago per patient report. The screws have started to erode through the lingual cortex and cause pain against the patient's tongue. He presents today for removal of hardware.     The patient was seen in the preoperative area. The risks, benefits, complications, treatment options, non-operative alternatives, expected recovery and outcomes were discussed with the patient. The possibilities of reaction to medication, pulmonary aspiration, injury to surrounding structures, bleeding, recurrent infection, the need for additional procedures, failure to diagnose a condition, and creating a complication requiring transfusion or operation were discussed with the patient. Risk of possible worsening V3 paresthesia as well as lingual paresthesia and jaw fracture were reviewed among other risks. The patient concurred with the proposed plan, giving informed consent.  The site of surgery was properly noted/marked if necessary per policy. The patient has been actively warmed in preoperative area. Preoperative antibiotics have been ordered and given within 1 hours of incision. Venous thrombosis prophylaxis are not indicated.    Procedure Details:   The patient was greeted in the pre-op holding area, where all preoperative risks and complications were reviewed. Later, the patient was brought into the operating room by the anesthesia staff and was placed in the supine position. A \"Time out\" was performed to confirmed patient's identity and the procedure to be performed. The patient was then induced for general anesthesia and intubated with an oral endotracheal tube which was secured by the anesthesia team. The patient was prepped and draped in standard oral and " maxillofacial surgery fashion. Throat pack was placed.    1% lido with 1:100 K epi was injected via left IA block and buccal and lingual infiltration. Pre-incision pause was performed. The site of incision at the right and left neck were then marked. Approx. 10cc 1% lidocaine with 1:100K epi was administered via intraoral buccal infiltration at the planned incision sites. A preincision pause was performed. Bovie electrocautery used to make standard BSSO incision at left anterior ramus. Incision carried through periosteum down to bone. Periosteal elevator used to dissect in a subperiosteal plane and expose hardware and lateral and medial cortices to aid in retraction of tissues.  Bipolar used to cauterize and bleeding, and site was then hemostatic. It was noted that the plate was very well integrated with some bony overgrowth. Bone was removed from over plate with periosteal elevator and Medicine in Practice handpiece with fissure bur and copious irrigation. Universal screw  was used to attempt to remove screws. There were new screwdrivers that fit the screws and bony ingrowth into crosshatch pattern made removal with screwdriver not feasible. NeoAccel handpiece used to section plate at each screw site. Hand instruments used to elevate pieces of plate off of the bone and free from screw heads. Large needle  used to grasp screws and unscrew them to remove. Some of the screws required troughing with surgical handpiece, fissure bur and copious irrigation to allow for purchase point and to mobilize the screw. All screws were successfully removed along with plate fragments. Sharp bony edges were smoothed with bone file. Site was irrigated with copious normal saline and Irrisept. There were no mandibular fractures noted after inspection of the mandible. Site hemostatic.     Incision closed with 3-0 vicryl in a running fashion. Primary water tight closure achieved.     Oral cavity suctioned clean. Throat pack removed. Care of  the patient was then transferred over to the anesthesia team. The patient was emerged from anesthesia, extubated and was taken to PACU in stable condition.     Dr. Becerra was present for all critical portions of the case.    Complications:  None; patient tolerated the procedure well.    Disposition: PACU - hemodynamically stable.  Condition: stable         Additional Details: NA    Attending Attestation:     Willie Becerra  Phone Number: [phone]

## 2024-03-28 NOTE — TELEPHONE ENCOUNTER
Tried calling pt by phone - no working number.  [employer] message sent as well as a letter in the mail.  1/11/24 appt has been canceled, he has been instructed to call the office and provide a working number and reschedule this appt.   [Initial Visit] : an initial visit for

## 2024-03-29 DIAGNOSIS — I10 PRIMARY HYPERTENSION: ICD-10-CM

## 2024-03-29 DIAGNOSIS — G89.29 CHRONIC MIDLINE LOW BACK PAIN WITHOUT SCIATICA: ICD-10-CM

## 2024-03-29 DIAGNOSIS — M54.50 CHRONIC MIDLINE LOW BACK PAIN WITHOUT SCIATICA: ICD-10-CM

## 2024-04-01 RX ORDER — MELOXICAM 15 MG/1
15 TABLET ORAL DAILY
Qty: 30 TABLET | Refills: 0 | Status: SHIPPED | OUTPATIENT
Start: 2024-04-01 | End: 2024-05-01

## 2024-04-01 RX ORDER — METOPROLOL SUCCINATE 50 MG/1
50 TABLET, EXTENDED RELEASE ORAL DAILY
Qty: 30 TABLET | Refills: 2 | Status: SHIPPED | OUTPATIENT
Start: 2024-04-01

## 2024-04-01 RX ORDER — HYDROCHLOROTHIAZIDE 25 MG/1
25 TABLET ORAL DAILY
Qty: 30 TABLET | Refills: 2 | Status: SHIPPED | OUTPATIENT
Start: 2024-04-01

## 2024-04-02 LAB
LABORATORY COMMENT REPORT: NORMAL
PATH REPORT.FINAL DX SPEC: NORMAL
PATH REPORT.GROSS SPEC: NORMAL
PATH REPORT.RELEVANT HX SPEC: NORMAL
PATH REPORT.TOTAL CANCER: NORMAL

## 2024-04-26 RX ORDER — OXYMETAZOLINE HCL 0.05 %
2 SPRAY, NON-AEROSOL (ML) NASAL EVERY 12 HOURS PRN
COMMUNITY
Start: 2024-04-24 | End: 2024-05-02

## 2024-04-26 RX ORDER — MICONAZOLE NITRATE 2 %
2 CREAM (GRAM) TOPICAL EVERY 2 HOUR PRN
COMMUNITY
Start: 2024-04-24

## 2024-04-26 NOTE — PROGRESS NOTES
Subjective   Reason for Visit: Augie Tucker is an 53 y.o. male here for a Medicare Wellness visit.     HPI    Ingrown Toenails: He has thick nails, about 10 years ago he needed them removed. Bilateral great toes      Had CMC of left thumb done, overall doing well. Has not followed up for a while, decreased strength. Did therapy a couple days a week and was doing good. Going to go to .      Blood infection from previous foot surgery with Dr. Lr. Was septic. He is still retaining water at times. He had atb from PICC line.      Weight gain: His mom had bariatric surgery, he has a gym downstairs, doing bike and walking on treadmill. He was started on ozempic and this has helped, would like to increase dose today.       Hep C: Has been taking the medication for a month Epclusa for 12 weeks.  Following up every 12 weeks.      RLS: He is constantly shaking his legs, started about a month ago. It used to be a side effect of medications. States he has walked on the treadmill before to try an help with this.      Right shoulder and back causing him pain, going to talk to Dr. Begum about this. No specific injury, taking his ibuprofen as needed, Voltaren does not help.      Knee pain: States he has been having right knee pain, has tried knee sleeve before and this was not helping. Would like a knee brace to help.      BLE: He is having swelling, he was started on low dose Lasix to take prior to this follow up and they have not been helping. He is not able to wear his regular shoes, states he has not increased his urination. He is having low back pain also. Denies blood in urine, pain with urinating.   Gets urine done daily, he is going to ask them to check urine culture next time they do it.      ED: He tried sildenafil and this did not help much. He was switched over tadalafil 10mg is working better for him and he was would like a refill.      Insomnia: States that zyprexa worked for him in the past for sleeping, he  "has to go to bed at 9pm but he has been up all night long. Increasing his hydroxazine to 50mg today.   He was given referral for sleep study before and never followed through     GERD: Waiting for appointment with GI. Dr. Woodruff October 25th. Needing prilosec for heartburn. He states he had one about 3 years ago. He is having issues with stomach burning, he is still losing weight. inconsistent, issues with diarrhea and constipation.     Shoulder pain: Out of Voltaren, interested in something else He was seeing Dr. Begum before for his back and neck and shoulder pains. Cant use his left shoulder The steroid helped before, cant sleep on it. This could be worsening his sleep. Lidocaine patches have helped in the past. Would like another round of medrol dose pack as this has also helped.      Neck Pain: He has a follow up with Neuro. He is seeing Aleah Avalos on Aug. 11th. He states its causing \"combustion in his head\", Causing blurred vision. Still feeling like someone is standing on his chest at times. He was told that he might have bone spurrs that could be causing issues in his neck. He was having left arm numbness. States that he was told he could see neurosurgery, needing referral. He was started on naproxen last time, Its not helping, has been taking Indocin from the hospital for a week. He is out of ibuprofen, only had it for 18 days 800mg tabs, this has helped in the past.   He is having pain in the back of his neck, states he feels popping and pain up the back of his neck. Also has hand numbness and tingling. Happening daily. States that he was able to bring his carpal tunnel braces for this. He has been given ibuprofen there for pain/headache and this is not helping much. States naproxen has helped in the past.      Going to see  With [facility]. Dr. Batista Stopped the sumatriptan and taking auto injection once per month. Needing to see new neurology. He now states that when he closes his eyes he is losing his " balance.      Chest pains: States this has improved a lot, bp has improved also.      He had some TIA's before in the past, he was worried about this.      All other systems reviewed and negative for complaint unless stated above.     Patient Care Team:  LEBRON Mock as PCP - General (Family Medicine)  LEBRON Mock as PCP - [employer] PCP     Review of Systems    Objective   Vitals:  There were no vitals taken for this visit.      Physical Exam    Assessment/Plan   Problem List Items Addressed This Visit    None      #Ingrown toenail   Referral for podiatry          #Fatigue   #Weight gain   blood work ordered   discussed diet and exercise   Discussed Mediterranean diet, info provided   On ozempic and this is helping      #Knee pain   Knee brace ordered   Ibuprofen for pain       #RLS  blood work ordered   discussed supportive care   if blood work normal discussed possible ropinirole     #ED  Sildenafil did not help   Continue tadalafil      #Neck pain  Xrays reviewd, degenerative changes   rx ibuprofen 800mg   Holding meloxicam      #Knee pain  Brace ordered      #Shoulder pain   established with Dr. Begum     #Migraines  #Balance issues   Referral to neuro      #Chest pains  #Anxiety  Improved   Lexapro 20mg      #Insomnia   increased hydroxyzine   Seeing Psych with community counseling  sleep study ordered   Dr. Brock   Increase duloxetine today      #GERD  #Abdominal pain  #Diarrhea  #Constipation  GI referral provided   refilled pantorprazole      #Edema  Stopping amlodipine   Leg swelling      #HTN   Check bp   Would like bp cuff ordered   Check bp at home and bring to follow up   Continue hydrochlorothiazide and metoprolol     #Asthma   Continue albuterol

## 2024-05-01 ENCOUNTER — APPOINTMENT (OUTPATIENT)
Dept: PRIMARY CARE | Facility: CLINIC | Age: 54
End: 2024-05-01
Payer: MEDICARE

## 2024-05-01 DIAGNOSIS — G57.21 FEMORAL NEUROPATHY OF RIGHT LOWER EXTREMITY: ICD-10-CM

## 2024-05-01 DIAGNOSIS — G89.29 CHRONIC MIDLINE LOW BACK PAIN WITHOUT SCIATICA: ICD-10-CM

## 2024-05-01 DIAGNOSIS — R60.9 PERIPHERAL EDEMA: ICD-10-CM

## 2024-05-01 DIAGNOSIS — M54.50 CHRONIC MIDLINE LOW BACK PAIN WITHOUT SCIATICA: ICD-10-CM

## 2024-05-01 RX ORDER — NORTRIPTYLINE HYDROCHLORIDE 50 MG/1
50 CAPSULE ORAL NIGHTLY
Qty: 30 CAPSULE | Refills: 3 | Status: SHIPPED | OUTPATIENT
Start: 2024-05-01 | End: 2024-08-29

## 2024-05-01 RX ORDER — FUROSEMIDE 20 MG/1
20 TABLET ORAL DAILY
Qty: 30 TABLET | Refills: 1 | Status: SHIPPED | OUTPATIENT
Start: 2024-05-01

## 2024-05-01 RX ORDER — MELOXICAM 15 MG/1
15 TABLET ORAL DAILY
Qty: 30 TABLET | Refills: 0 | Status: SHIPPED | OUTPATIENT
Start: 2024-05-01 | End: 2024-05-22

## 2024-05-06 DIAGNOSIS — E78.2 MODERATE MIXED HYPERLIPIDEMIA NOT REQUIRING STATIN THERAPY: ICD-10-CM

## 2024-05-06 NOTE — OP NOTE
PREOPERATIVE DIAGNOSIS:  1. Inferior calcaneal spur, left.  2. Chronic plantar fasciitis, left.    POSTOPERATIVE DIAGNOSIS:    OPERATION/PROCEDURE:  1. Partial resection, partial plantar fasciectomy, left.  2. Resection of inferior calcaneal spur, left.    SURGEON:  Augie Crespo DPM.    ASSISTANT(S):  None.    ANESTHESIA:    PROCEDURE IN DETAIL:  The patient was brought to the OR and placed on the table in supine  position.  Under the influence of general anesthesia, a thigh  tourniquet was placed.  Foot was prepped and draped in usual aseptic  manner.  Left lower extremity elevated for exsanguination.  Limb and  the tourniquet raised to a pressure of 300 mmHg.  At this time, a  linear incision was placed over an area which had previous surgery,  currently bulging and the incision was made horizontal and parallel  to the weightbearing surface.  There was noted to be an immediate  extrusion of fat after bovieing of subcuticular tissue.  This  concentrated area of fat was dissected from the soft tissue  attachments and in addition, there was a very large bursa which was  most likely chronic in nature.  This entire soft tissue mass was  resected and sent to pathology.  At this time, a [place] elevator was  used to separate the plantar fascia from the abductor hallucis muscle  belly.  This was taken transversely.  An incision again was made  parallel to the weightbearing surface making contact with the  calcaneus.  A Kocher was then transposed dorsally and plantarly to  the plantar fascia, preserving the lateral column of the plantar  fascia.  A 1 cm section was then resected.  This was also sent to  pathology.  The calcaneal spur itself was then resected via a  rongeur.  The rough bony surface was then smoothed with a power rasp.   The entire area was lavaged.  At this time, a ANTON drain was placed  and it exited medially to the ankle.  This was secured in a lateral  trap fashion with 4-0 Prolene.  Subcuticular  closure with 4-0 Vicryl  suture and the skin was repaired with both 3-0 and 4-0 Prolene.  A  posterior tibial nerve block was then performed with 5 cc of 0.5%  plain Marcaine.  Initial additional amount was infiltrated throughout  the heel pad.  1 cc of dexamethasone phosphate also infiltrated  proximally.  A dry sterile compressive dressing was applied.  This  extended itself onto the proximal portion of the lower extremity.  This was protective of the ANTON drain.  He was minimally active at this  time.  Estimated blood loss during the case was approximately 10 cc.  Tourniquet time was 31 minute.     The patient returned to recovery room with vital signs stable and dry  sterile dressing intact on the left.  He tolerated the procedure  well.  He was given a prescription for Toradol in advance.  His  postoperative visits have been scheduled in advance.  He has a knee  scooter and a walking boot for protective purposes only.  He  understands the concept that he needs to be nonweightbearing for the  next 3 weeks.  He will be closely monitored throughout the  postoperative course.       Augie Lr DPM    DD:  05/09/2023 14:36:17 EST  DT:  05/10/2023 03:18:09 EST  DICTATION NUMBER:  [id number]  INTERNAL JOB NUMBER:  [id number]    CC:  Augie Lr DPM, Fax: [phone]        Electronic Signatures:  Augie Lr) (Signed on 13-Jun-2023 08:45)   Authored  Unsigned, Draft (SYS GENERATED) (Entered on 10-May-2023 03:18)   Entered    Last Updated: 13-Jun-2023 08:45 by Augie Lr)

## 2024-05-07 RX ORDER — ATORVASTATIN CALCIUM 40 MG/1
40 TABLET, FILM COATED ORAL DAILY
Qty: 30 TABLET | Refills: 2 | Status: SHIPPED | OUTPATIENT
Start: 2024-05-07

## 2024-05-13 ENCOUNTER — APPOINTMENT (OUTPATIENT)
Dept: PRIMARY CARE | Facility: CLINIC | Age: 54
End: 2024-05-13
Payer: MEDICARE

## 2024-05-22 DIAGNOSIS — G89.29 CHRONIC MIDLINE LOW BACK PAIN WITHOUT SCIATICA: ICD-10-CM

## 2024-05-22 DIAGNOSIS — M54.50 CHRONIC MIDLINE LOW BACK PAIN WITHOUT SCIATICA: ICD-10-CM

## 2024-05-22 DIAGNOSIS — M54.12 CERVICAL RADICULOPATHY, CHRONIC: ICD-10-CM

## 2024-05-22 DIAGNOSIS — G62.9 NEUROPATHY: ICD-10-CM

## 2024-05-22 RX ORDER — PREGABALIN 200 MG/1
200 CAPSULE ORAL 3 TIMES DAILY
Qty: 90 CAPSULE | Refills: 2 | Status: SHIPPED | OUTPATIENT
Start: 2024-05-22

## 2024-05-22 RX ORDER — MELOXICAM 15 MG/1
15 TABLET ORAL DAILY
Qty: 30 TABLET | Refills: 0 | Status: SHIPPED | OUTPATIENT
Start: 2024-05-22

## 2024-06-03 DIAGNOSIS — F41.9 ANXIETY: ICD-10-CM

## 2024-06-03 RX ORDER — DULOXETIN HYDROCHLORIDE 60 MG/1
60 CAPSULE, DELAYED RELEASE ORAL 2 TIMES DAILY
Qty: 60 CAPSULE | Refills: 2 | Status: SHIPPED | OUTPATIENT
Start: 2024-06-03
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
## 2024-06-19 NOTE — PATIENT INSTRUCTIONS
-Ice on and off for the next 24 hours if injection sites are sore. Do gentle range of motion exercises in each area that was injected. Try to do them every hour for about half a minute or so, in every direction that the affected part goes. No pool, bath, or hot tub today. Avoid heavy lifting for the next 2 days.    -continue nortriptyline, lyrica, cymbalta  -Lyrica contract next visit   -Take meloxicam only as needed  - consider other medications in the future, including restarting Robaxin  -Continue Voltaren gel as needed  -Consider referral to new pain management doctor for consideration of repeat stellate ganglion blocks, cervical and lumbar facet block/RFA  -New Cervical MRI ordered previously. Proceed if your left arm symptoms come back  - continue daily home exercises  -Consider adam chi , OMT, acupuncture  -Follow up 3-4 months or sooner if needed

## 2024-06-20 ENCOUNTER — APPOINTMENT (OUTPATIENT)
Dept: PHYSICAL MEDICINE AND REHAB | Facility: CLINIC | Age: 54
End: 2024-06-20
Payer: MEDICARE

## 2024-06-20 VITALS
WEIGHT: 212 LBS | DIASTOLIC BLOOD PRESSURE: 79 MMHG | BODY MASS INDEX: 30.42 KG/M2 | TEMPERATURE: 97.1 F | HEART RATE: 78 BPM | SYSTOLIC BLOOD PRESSURE: 116 MMHG

## 2024-06-20 DIAGNOSIS — M53.3 SACROILIAC JOINT DYSFUNCTION OF BOTH SIDES: ICD-10-CM

## 2024-06-20 DIAGNOSIS — G57.21 FEMORAL NEUROPATHY OF RIGHT LOWER EXTREMITY: Primary | ICD-10-CM

## 2024-06-20 DIAGNOSIS — G54.2 CERVICAL NEUROPATHY: ICD-10-CM

## 2024-06-20 DIAGNOSIS — M54.9 BACK PAIN, UNSPECIFIED BACK LOCATION, UNSPECIFIED BACK PAIN LATERALITY, UNSPECIFIED CHRONICITY: ICD-10-CM

## 2024-06-20 DIAGNOSIS — G62.9 NEUROPATHY: ICD-10-CM

## 2024-06-20 DIAGNOSIS — G89.29 CHRONIC MIDLINE LOW BACK PAIN WITHOUT SCIATICA: ICD-10-CM

## 2024-06-20 DIAGNOSIS — M79.7 FIBROMYALGIA: ICD-10-CM

## 2024-06-20 DIAGNOSIS — G56.42 COMPLEX REGIONAL PAIN SYNDROME TYPE 2 OF LEFT UPPER EXTREMITY: ICD-10-CM

## 2024-06-20 DIAGNOSIS — M54.50 CHRONIC MIDLINE LOW BACK PAIN WITHOUT SCIATICA: ICD-10-CM

## 2024-06-20 DIAGNOSIS — M79.18 MYOFASCIAL PAIN: ICD-10-CM

## 2024-06-20 DIAGNOSIS — M54.12 CERVICAL RADICULOPATHY, CHRONIC: ICD-10-CM

## 2024-06-20 DIAGNOSIS — M54.16 CHRONIC LUMBAR RADICULOPATHY: ICD-10-CM

## 2024-06-20 DIAGNOSIS — M47.817 SPONDYLOSIS OF LUMBOSACRAL REGION, UNSPECIFIED SPINAL OSTEOARTHRITIS COMPLICATION STATUS: ICD-10-CM

## 2024-06-20 DIAGNOSIS — M75.102 ROTATOR CUFF SYNDROME OF LEFT SHOULDER: ICD-10-CM

## 2024-06-20 DIAGNOSIS — F41.9 ANXIETY: ICD-10-CM

## 2024-06-20 PROBLEM — Z86.73 HX OF TIA (TRANSIENT ISCHEMIC ATTACK) AND STROKE: Status: ACTIVE | Noted: 2024-04-21

## 2024-06-20 PROBLEM — F31.9 BIPOLAR I DISORDER (MULTI): Status: ACTIVE | Noted: 2024-06-20

## 2024-06-20 PROBLEM — F33.2 MAJOR DEPRESSIVE DISORDER, RECURRENT SEVERE WITHOUT PSYCHOTIC FEATURES (MULTI): Status: ACTIVE | Noted: 2024-04-20

## 2024-06-20 PROCEDURE — 3074F SYST BP LT 130 MM HG: CPT | Performed by: PHYSICAL MEDICINE & REHABILITATION

## 2024-06-20 PROCEDURE — 3008F BODY MASS INDEX DOCD: CPT | Performed by: PHYSICAL MEDICINE & REHABILITATION

## 2024-06-20 PROCEDURE — 3078F DIAST BP <80 MM HG: CPT | Performed by: PHYSICAL MEDICINE & REHABILITATION

## 2024-06-20 PROCEDURE — 3044F HG A1C LEVEL LT 7.0%: CPT | Performed by: PHYSICAL MEDICINE & REHABILITATION

## 2024-06-20 PROCEDURE — 20553 NJX 1/MLT TRIGGER POINTS 3/>: CPT | Performed by: PHYSICAL MEDICINE & REHABILITATION

## 2024-06-20 RX ORDER — NORTRIPTYLINE HYDROCHLORIDE 50 MG/1
50 CAPSULE ORAL NIGHTLY
Qty: 30 CAPSULE | Refills: 3 | Status: SHIPPED | OUTPATIENT
Start: 2024-06-20 | End: 2024-10-18

## 2024-06-20 RX ORDER — HYDROXYZINE PAMOATE 50 MG/1
CAPSULE ORAL
COMMUNITY
Start: 2024-03-14

## 2024-06-20 RX ORDER — DULOXETIN HYDROCHLORIDE 60 MG/1
60 CAPSULE, DELAYED RELEASE ORAL 2 TIMES DAILY
Qty: 60 CAPSULE | Refills: 2 | Status: SHIPPED | OUTPATIENT
Start: 2024-06-20

## 2024-06-20 RX ORDER — MELOXICAM 15 MG/1
15 TABLET ORAL DAILY
Qty: 30 TABLET | Refills: 0 | Status: SHIPPED | OUTPATIENT
Start: 2024-06-20

## 2024-06-20 RX ORDER — PREGABALIN 200 MG/1
200 CAPSULE ORAL 3 TIMES DAILY
Qty: 90 CAPSULE | Refills: 2 | Status: SHIPPED | OUTPATIENT
Start: 2024-06-20

## 2024-06-20 RX ORDER — MUPIROCIN 20 MG/G
1 OINTMENT TOPICAL 2 TIMES DAILY
COMMUNITY
Start: 2024-05-22

## 2024-06-20 ASSESSMENT — PAIN SCALES - GENERAL: PAINLEVEL: 6

## 2024-06-20 NOTE — PROGRESS NOTES
Subjective   Reason for Visit: Augie Tucker is an 54 y.o. male here for a Medicare Wellness visit.         HPI    Sinus issues: He is going to be having surgery on his sinuses with ENT, Dr. Edwards on July 3rd. He has a mass and going to get biopsy. [facility]     HTN: he has been monitoring his bp at home, taking lasix for edema and metoprolol 50mg. He has been having some episodes of dizziness with standing. BP has been running low based on home readings.      Weight gain: His mom had bariatric surgery, he has a gym downstairs, doing bike and walking on treadmill. He was started on ozempic and this has helped      Hep C: Has been taking the medication for a month Epclusa for 12 weeks.  Following up every 12 weeks.      RLS: He is constantly shaking his legs, started about a month ago. It used to be a side effect of medications. States he has walked on the treadmill before to try an help with this.      Right shoulder and back causing him pain, going to talk to Dr. Begum about this. No specific injury, taking his ibuprofen as needed, Voltaren does not help.      Knee pain: States he has been having right knee pain, has tried knee sleeve before and this was not helping. Would like a knee brace to help.      BLE: He is having swelling, he was started on low dose Lasix to take prior to this follow up and they have not been helping. He is not able to wear his regular shoes, states he has not increased his urination. He is having low back pain also. Denies blood in urine, pain with urinating. Gets urine done daily, he is going to ask them to check urine culture next time they do it.      ED: He tried sildenafil and this did not help much. He was switched over tadalafil 10mg is working better for him and he was would like a refill.      Insomnia: States that zyprexa worked for him in the past for sleeping, he has to go to bed at 9pm but he has been up all night long. Increasing his hydroxazine to 50mg today.   He  "was given referral for sleep study before and never followed through     GERD: Waiting for appointment with GI. Dr. Woodruff October 25th. Needing prilosec for heartburn. He states he had one about 3 years ago. He is having issues with stomach burning, he is still losing weight. inconsistent, issues with diarrhea and constipation.     Shoulder pain: Out of Voltaren, interested in something else He was seeing Dr. Begum before for his back and neck and shoulder pains. Cant use his left shoulder The steroid helped before, cant sleep on it. This could be worsening his sleep. Lidocaine patches have helped in the past. Would like another round of medrol dose pack as this has also helped.      Neck Pain: He has a follow up with Neuro. He is seeing Aleah Avalos on Aug. 11th. He states its causing \"combustion in his head\", Causing blurred vision. Still feeling like someone is standing on his chest at times. He was told that he might have bone spurrs that could be causing issues in his neck. He was having left arm numbness. States that he was told he could see neurosurgery, needing referral. He was started on naproxen last time, Its not helping, has been taking Indocin from the hospital for a week. He is out of ibuprofen, only had it for 18 days 800mg tabs, this has helped in the past.   He is having pain in the back of his neck, states he feels popping and pain up the back of his neck. Also has hand numbness and tingling. Happening daily. States that he was able to bring his carpal tunnel braces for this. He has been given ibuprofen there for pain/headache and this is not helping much. States naproxen has helped in the past.      Going to see  With [facility]. Dr. Batista Stopped the sumatriptan and taking auto injection once per month. Needing to see new neurology. He now states that when he closes his eyes he is losing his balance.      Chest pains: States this has improved a lot, bp has improved also.      He had some TIA's " before in the past, he was worried about this.      All other systems reviewed and negative for complaint unless stated above.     Patient Care Team:  LEBRON Mock as PCP - General (Family Medicine)  LEBRON Mock as PCP - [employer] PCP     Review of Systems   Constitutional:  Negative for chills and fever.   HENT:  Negative for congestion, ear pain and rhinorrhea.    Eyes:  Negative for discharge and redness.   Respiratory:  Negative for cough, shortness of breath and wheezing.    Cardiovascular:  Negative for chest pain and leg swelling.   Gastrointestinal:  Negative for abdominal pain, constipation, diarrhea, nausea and vomiting.   Genitourinary:  Negative for difficulty urinating, frequency and urgency.   Musculoskeletal:  Negative for gait problem.   Skin:  Negative for rash and wound.   Neurological:  Negative for dizziness, weakness and headaches.   Psychiatric/Behavioral:  Negative for confusion. The patient is not nervous/anxious.        Objective   Vitals:  /75 (BP Location: Right arm, Patient Position: Sitting, BP Cuff Size: Large adult)   Pulse 70   Wt 101 kg (222 lb)   BMI 31.85 kg/m²       Physical Exam  Vitals reviewed.   Constitutional:       Appearance: Normal appearance.   HENT:      Head: Normocephalic.      Right Ear: Tympanic membrane, ear canal and external ear normal.      Left Ear: External ear normal. There is impacted cerumen.      Nose: No rhinorrhea.      Mouth/Throat:      Mouth: Mucous membranes are moist.      Pharynx: Oropharynx is clear.   Eyes:      Pupils: Pupils are equal, round, and reactive to light.   Cardiovascular:      Rate and Rhythm: Normal rate and regular rhythm.      Pulses: Normal pulses.   Pulmonary:      Effort: Pulmonary effort is normal.      Breath sounds: Normal breath sounds.   Abdominal:      General: Abdomen is flat. Bowel sounds are normal.      Palpations: Abdomen is soft.   Musculoskeletal:          General: No tenderness. Normal range of motion.      Right lower leg: No edema.      Left lower leg: No edema.   Lymphadenopathy:      Cervical: No cervical adenopathy.   Skin:     General: Skin is warm and dry.      Findings: No rash.   Neurological:      Mental Status: He is alert and oriented to person, place, and time.   Psychiatric:         Mood and Affect: Mood normal.         Behavior: Behavior normal.         Assessment/Plan   Problem List Items Addressed This Visit    None    #Sinus issues   Getting surgery on July 3rd   Mass, getting biopsied  Has audiology appointment in July     #Cerumen impaction   Rx debrox drop   Removed some wax in office   Can stop in for flushing if needed.     #HTN   Check bp   Lowering dose of metoprolol to 25mg   Having dizziness with standings   Continue lasix, peripheral edema   Checking cmp      #Ingrown toenail   Referral for podiatry          #Fatigue   #Weight gain   blood work ordered   discussed diet and exercise   Discussed Mediterranean diet, info provided   On ozempic and this is helping      #Knee pain   Knee brace ordered   Ibuprofen for pain       #RLS  blood work ordered   discussed supportive care   if blood work normal discussed possible ropinirole     #ED  Sildenafil did not help   Continue tadalafil      #Neck pain  Xrays reviewd, degenerative changes   rx ibuprofen 800mg   Holding meloxicam      #Knee pain  Brace ordered      #Shoulder pain   established with Dr. Begum     #Migraines  #Balance issues   Referral to neuro      #Chest pains  #Anxiety  Improved   Lexapro 20mg      #Insomnia   increased hydroxyzine   Seeing Psych with community counseling  sleep study ordered   Dr. Brock   Increase duloxetine today      #GERD  #Abdominal pain  #Diarrhea  #Constipation  GI referral provided   refilled pantorprazole     #Asthma   Continue albuterol

## 2024-06-25 ENCOUNTER — APPOINTMENT (OUTPATIENT)
Dept: PRIMARY CARE | Facility: CLINIC | Age: 54
End: 2024-06-25
Payer: MEDICARE

## 2024-06-25 VITALS
WEIGHT: 222 LBS | SYSTOLIC BLOOD PRESSURE: 112 MMHG | HEART RATE: 70 BPM | DIASTOLIC BLOOD PRESSURE: 75 MMHG | BODY MASS INDEX: 31.85 KG/M2

## 2024-06-25 DIAGNOSIS — Z00.00 ROUTINE GENERAL MEDICAL EXAMINATION AT HEALTH CARE FACILITY: Primary | ICD-10-CM

## 2024-06-25 DIAGNOSIS — H61.22 IMPACTED CERUMEN OF LEFT EAR: ICD-10-CM

## 2024-06-25 DIAGNOSIS — I10 PRIMARY HYPERTENSION: ICD-10-CM

## 2024-06-25 DIAGNOSIS — T78.40XA ALLERGY, INITIAL ENCOUNTER: ICD-10-CM

## 2024-06-25 PROCEDURE — G0439 PPPS, SUBSEQ VISIT: HCPCS | Performed by: REGISTERED NURSE

## 2024-06-25 PROCEDURE — 3044F HG A1C LEVEL LT 7.0%: CPT | Performed by: REGISTERED NURSE

## 2024-06-25 PROCEDURE — 3078F DIAST BP <80 MM HG: CPT | Performed by: REGISTERED NURSE

## 2024-06-25 PROCEDURE — 3008F BODY MASS INDEX DOCD: CPT | Performed by: REGISTERED NURSE

## 2024-06-25 PROCEDURE — 3074F SYST BP LT 130 MM HG: CPT | Performed by: REGISTERED NURSE

## 2024-06-25 RX ORDER — METOPROLOL SUCCINATE 25 MG/1
25 TABLET, EXTENDED RELEASE ORAL DAILY
Qty: 90 TABLET | Refills: 3 | Status: SHIPPED | OUTPATIENT
Start: 2024-06-25 | End: 2025-06-25

## 2024-06-25 RX ORDER — CETIRIZINE HYDROCHLORIDE 10 MG/1
10 TABLET ORAL DAILY
Qty: 30 TABLET | Refills: 5 | Status: SHIPPED | OUTPATIENT
Start: 2024-06-25 | End: 2024-12-22

## 2024-06-25 ASSESSMENT — ENCOUNTER SYMPTOMS
WOUND: 0
COUGH: 0
FREQUENCY: 0
NAUSEA: 0
DIZZINESS: 0
DIARRHEA: 0
DIFFICULTY URINATING: 0
SHORTNESS OF BREATH: 0
WHEEZING: 0
CHILLS: 0
FEVER: 0
RHINORRHEA: 0
ABDOMINAL PAIN: 0
CONFUSION: 0
WEAKNESS: 0
HEADACHES: 0
VOMITING: 0
EYE REDNESS: 0
CONSTIPATION: 0
EYE DISCHARGE: 0
NERVOUS/ANXIOUS: 0

## 2024-06-25 ASSESSMENT — ACTIVITIES OF DAILY LIVING (ADL)
TAKING_MEDICATION: NEEDS ASSISTANCE
BATHING: INDEPENDENT
DOING_HOUSEWORK: INDEPENDENT
GROCERY_SHOPPING: INDEPENDENT
MANAGING_FINANCES: INDEPENDENT
DRESSING: INDEPENDENT

## 2024-06-25 ASSESSMENT — PATIENT HEALTH QUESTIONNAIRE - PHQ9
10. IF YOU CHECKED OFF ANY PROBLEMS, HOW DIFFICULT HAVE THESE PROBLEMS MADE IT FOR YOU TO DO YOUR WORK, TAKE CARE OF THINGS AT HOME, OR GET ALONG WITH OTHER PEOPLE: NOT DIFFICULT AT ALL
SUM OF ALL RESPONSES TO PHQ9 QUESTIONS 1 AND 2: 2
1. LITTLE INTEREST OR PLEASURE IN DOING THINGS: MORE THAN HALF THE DAYS
2. FEELING DOWN, DEPRESSED OR HOPELESS: NOT AT ALL

## 2024-07-01 DIAGNOSIS — R60.9 PERIPHERAL EDEMA: ICD-10-CM

## 2024-07-01 DIAGNOSIS — E11.9 TYPE 2 DIABETES MELLITUS WITHOUT COMPLICATION, WITHOUT LONG-TERM CURRENT USE OF INSULIN (MULTI): ICD-10-CM

## 2024-07-01 DIAGNOSIS — E66.2 CLASS 2 OBESITY WITH ALVEOLAR HYPOVENTILATION WITHOUT SERIOUS COMORBIDITY WITH BODY MASS INDEX (BMI) OF 36.0 TO 36.9 IN ADULT (MULTI): ICD-10-CM

## 2024-07-01 RX ORDER — FUROSEMIDE 20 MG/1
20 TABLET ORAL DAILY
Qty: 30 TABLET | Refills: 1 | Status: SHIPPED | OUTPATIENT
Start: 2024-07-01

## 2024-07-01 RX ORDER — SEMAGLUTIDE 1.34 MG/ML
1 INJECTION, SOLUTION SUBCUTANEOUS
Qty: 3 ML | Refills: 2 | Status: SHIPPED | OUTPATIENT
Start: 2024-07-01

## 2024-07-17 DIAGNOSIS — E78.2 MODERATE MIXED HYPERLIPIDEMIA NOT REQUIRING STATIN THERAPY: ICD-10-CM

## 2024-07-17 RX ORDER — ATORVASTATIN CALCIUM 40 MG/1
40 TABLET, FILM COATED ORAL DAILY
Qty: 30 TABLET | Refills: 2 | Status: SHIPPED | OUTPATIENT
Start: 2024-07-17

## 2024-07-18 DIAGNOSIS — G89.29 CHRONIC MIDLINE LOW BACK PAIN WITHOUT SCIATICA: ICD-10-CM

## 2024-07-18 DIAGNOSIS — M54.50 CHRONIC MIDLINE LOW BACK PAIN WITHOUT SCIATICA: ICD-10-CM

## 2024-07-18 RX ORDER — MELOXICAM 15 MG/1
15 TABLET ORAL DAILY
Qty: 30 TABLET | Refills: 0 | Status: SHIPPED | OUTPATIENT
Start: 2024-07-18

## 2024-08-14 ENCOUNTER — TELEPHONE (OUTPATIENT)
Dept: PULMONOLOGY | Facility: HOSPITAL | Age: 54
End: 2024-08-14
Payer: MEDICARE

## 2024-08-14 DIAGNOSIS — H61.22 IMPACTED CERUMEN OF LEFT EAR: ICD-10-CM

## 2024-08-14 DIAGNOSIS — R60.9 PERIPHERAL EDEMA: ICD-10-CM

## 2024-08-14 RX ORDER — FUROSEMIDE 20 MG/1
20 TABLET ORAL DAILY
Qty: 30 TABLET | Refills: 1 | Status: SHIPPED | OUTPATIENT
Start: 2024-08-14

## 2024-08-14 RX ORDER — OLIVE OIL
OIL (ML) MISCELLANEOUS
Qty: 15 ML | Refills: 0 | Status: SHIPPED | OUTPATIENT
Start: 2024-08-14

## 2024-08-14 NOTE — TELEPHONE ENCOUNTER
Called the patient several times and left many messages explaining that we are unable to refill his pulmonary medications until he has scheduled an appointment with Jenni Inman.

## 2024-08-15 DIAGNOSIS — J43.2 CENTRILOBULAR EMPHYSEMA (MULTI): ICD-10-CM

## 2024-08-19 RX ORDER — FLUTICASONE FUROATE, UMECLIDINIUM BROMIDE AND VILANTEROL TRIFENATATE 100; 62.5; 25 UG/1; UG/1; UG/1
POWDER RESPIRATORY (INHALATION)
Qty: 60 EACH | Refills: 0 | Status: SHIPPED | OUTPATIENT
Start: 2024-08-19

## 2024-08-21 DIAGNOSIS — G89.29 CHRONIC MIDLINE LOW BACK PAIN WITHOUT SCIATICA: ICD-10-CM

## 2024-08-21 DIAGNOSIS — M54.50 CHRONIC MIDLINE LOW BACK PAIN WITHOUT SCIATICA: ICD-10-CM

## 2024-08-21 RX ORDER — MELOXICAM 15 MG/1
15 TABLET ORAL DAILY
Qty: 30 TABLET | Refills: 0 | Status: SHIPPED | OUTPATIENT
Start: 2024-08-21

## 2024-08-21 NOTE — PROGRESS NOTES
Subjective   Patient ID: Augie Tucker III is a 54 y.o. male who presents for Follow-up (Medication refills, discuss medications; ).    HPI     Sinus issues: He is going to be having surgery on his sinuses with ENT, Dr. Edwards on July 3rd. He has a mass and going to get biopsy. [facility]      HTN: he has been monitoring his bp at home, taking lasix for edema and metoprolol 50mg. He has been having some episodes of dizziness with standing. BP has been running low based on home readings.      Weight gain: His mom had bariatric surgery, he has a gym downstairs, doing bike and walking on treadmill. He was started on ozempic and this has helped.   He will eat some protein.       Hep C: Has been taking the medication for a month Epclusa for 12 weeks.  Following up every 12 weeks.      RLS: He is constantly shaking his legs, started about a month ago. It used to be a side effect of medications. States he has walked on the treadmill before to try an help with this.      Right shoulder and back causing him pain, going to talk to Dr. Begum about this. No specific injury, taking his ibuprofen as needed, Voltaren does not help.      Knee pain: States he has been having right knee pain, has tried knee sleeve before and this was not helping. Would like a knee brace to help.      BLE: He is having swelling, he was started on low dose Lasix to take prior to this follow up and they have not been helping. He is not able to wear his regular shoes, states he has not increased his urination. He is having low back pain also. Denies blood in urine, pain with urinating. Gets urine done daily, he is going to ask them to check urine culture next time they do it.      ED: He tried sildenafil and this did not help much. He was switched over tadalafil 10mg is working better for him and he was would like a refill.      Insomnia: States that zyprexa worked for him in the past for sleeping, he has to go to bed at 9pm but he has been up all  "night long. Increasing his hydroxazine to 50mg today.   He was given referral for sleep study before and never followed through     GERD: Waiting for appointment with GI. Dr. Woodruff October 25th. Needing prilosec for heartburn. He states he had one about 3 years ago. He is having issues with stomach burning, he is still losing weight. inconsistent, issues with diarrhea and constipation.     Shoulder pain: Out of Voltaren, interested in something else He was seeing Dr. Begum before for his back and neck and shoulder pains. Cant use his left shoulder The steroid helped before, cant sleep on it. This could be worsening his sleep. Lidocaine patches have helped in the past. Would like another round of medrol dose pack as this has also helped.      Neck Pain: He has a follow up with Neuro. He is seeing Aleah Avalos on Aug. 11th. He states its causing \"combustion in his head\", Causing blurred vision. Still feeling like someone is standing on his chest at times. He was told that he might have bone spurrs that could be causing issues in his neck. He was having left arm numbness. States that he was told he could see neurosurgery, needing referral. He was started on naproxen last time, Its not helping, has been taking Indocin from the hospital for a week. He is out of ibuprofen, only had it for 18 days 800mg tabs, this has helped in the past.   He is having pain in the back of his neck, states he feels popping and pain up the back of his neck. Also has hand numbness and tingling. Happening daily. States that he was able to bring his carpal tunnel braces for this. He has been given ibuprofen there for pain/headache and this is not helping much. States naproxen has helped in the past.      Going to see  With [facility]. Dr. Batista Stopped the sumatriptan and taking auto injection once per month. Needing to see new neurology. He now states that when he closes his eyes he is losing his balance.      Chest pains: States this has " "improved a lot, bp has improved also.      He had some TIA's before in the past, he was worried about this.      All other systems reviewed and negative for complaint unless stated above.     Review of Systems   Constitutional:  Negative for chills and fever.   HENT:  Negative for congestion, ear pain and rhinorrhea.    Eyes:  Negative for discharge and redness.   Respiratory:  Negative for cough, shortness of breath and wheezing.    Cardiovascular:  Negative for chest pain and leg swelling.   Gastrointestinal:  Negative for abdominal pain, constipation, diarrhea, nausea and vomiting.   Genitourinary:  Negative for difficulty urinating, frequency and urgency.   Musculoskeletal:  Negative for gait problem.   Skin:  Negative for rash and wound.   Neurological:  Negative for dizziness, weakness and headaches.   Psychiatric/Behavioral:  Negative for confusion. The patient is not nervous/anxious.        Objective   /70 (BP Location: Right arm, Patient Position: Sitting, BP Cuff Size: Large adult)   Pulse 76   Ht 1.778 m (5' 10\")   Wt 104 kg (230 lb)   BMI 33.00 kg/m²     Physical Exam  Vitals reviewed.   Constitutional:       Appearance: Normal appearance.   HENT:      Head: Normocephalic.      Right Ear: Tympanic membrane, ear canal and external ear normal.      Left Ear: Tympanic membrane, ear canal and external ear normal.      Nose: No rhinorrhea.      Mouth/Throat:      Mouth: Mucous membranes are moist.      Pharynx: Oropharynx is clear.   Eyes:      Pupils: Pupils are equal, round, and reactive to light.   Cardiovascular:      Rate and Rhythm: Normal rate and regular rhythm.      Pulses: Normal pulses.   Pulmonary:      Effort: Pulmonary effort is normal.      Breath sounds: Normal breath sounds.   Abdominal:      General: Abdomen is flat. Bowel sounds are normal.      Palpations: Abdomen is soft.   Musculoskeletal:         General: No tenderness. Normal range of motion.      Right lower leg: No edema. "      Left lower leg: No edema.   Lymphadenopathy:      Cervical: No cervical adenopathy.   Skin:     General: Skin is warm and dry.      Findings: No rash.   Neurological:      Mental Status: He is alert and oriented to person, place, and time.   Psychiatric:         Mood and Affect: Mood normal.         Behavior: Behavior normal.       Assessment/Plan       #Sinus issues   Getting surgery on July 3rd   Mass, getting biopsied  Has audiology appointment in July      #Cerumen impaction   Rx debrox drop   Removed some wax in office   Can stop in for flushing if needed.      #HTN   Check bp   Lowering dose of metoprolol to 25mg   Having dizziness with standings   Continue lasix, peripheral edema   Checking cmp       #Ingrown toenail   Referral for podiatry          #Fatigue   #Weight gain   blood work ordered   discussed diet and exercise   Discussed Mediterranean diet, info provided   On ozempic and this is helping   May need to change to tirzepatide   Works with mom on diet   Follow up to discussed ozempic in 2 months   On higher dose     #Knee pain   Knee brace ordered   Ibuprofen for pain       #RLS  blood work ordered   discussed supportive care   if blood work normal discussed possible ropinirole     #ED  Sildenafil did not help   Continue tadalafil      #Neck pain  Xrays reviewd, degenerative changes   rx ibuprofen 800mg   Holding meloxicam      #Knee pain  Brace ordered      #Shoulder pain   established with Dr. Begum     #Migraines  #Balance issues   Referral to neuro      #Chest pains  #Anxiety  Improved   Lexapro 20mg      #Insomnia   increased hydroxyzine   Seeing Psych with community counseling  sleep study ordered   Dr. Brock   Increase duloxetine today      #GERD  #Abdominal pain  #Diarrhea  #Constipation  GI referral provided   refilled pantorprazole      #Asthma   Continue albuterol

## 2024-08-26 ENCOUNTER — APPOINTMENT (OUTPATIENT)
Dept: PRIMARY CARE | Facility: CLINIC | Age: 54
End: 2024-08-26
Payer: MEDICARE

## 2024-08-26 VITALS
WEIGHT: 230 LBS | DIASTOLIC BLOOD PRESSURE: 70 MMHG | SYSTOLIC BLOOD PRESSURE: 106 MMHG | HEART RATE: 76 BPM | HEIGHT: 70 IN | BODY MASS INDEX: 32.93 KG/M2

## 2024-08-26 DIAGNOSIS — Z12.5 PROSTATE CANCER SCREENING: ICD-10-CM

## 2024-08-26 DIAGNOSIS — R73.09 ELEVATED GLUCOSE LEVEL: ICD-10-CM

## 2024-08-26 DIAGNOSIS — Z12.11 COLON CANCER SCREENING: ICD-10-CM

## 2024-08-26 DIAGNOSIS — E11.9 TYPE 2 DIABETES MELLITUS WITHOUT COMPLICATION, WITHOUT LONG-TERM CURRENT USE OF INSULIN (MULTI): ICD-10-CM

## 2024-08-26 DIAGNOSIS — F31.9 BIPOLAR I DISORDER (MULTI): ICD-10-CM

## 2024-08-26 DIAGNOSIS — I70.0 ATHEROSCLEROSIS OF AORTA (CMS-HCC): Primary | ICD-10-CM

## 2024-08-26 DIAGNOSIS — B18.2 CHRONIC HEPATITIS C WITHOUT HEPATIC COMA (MULTI): ICD-10-CM

## 2024-08-26 DIAGNOSIS — E66.2 CLASS 2 OBESITY WITH ALVEOLAR HYPOVENTILATION WITHOUT SERIOUS COMORBIDITY WITH BODY MASS INDEX (BMI) OF 36.0 TO 36.9 IN ADULT (MULTI): ICD-10-CM

## 2024-08-26 DIAGNOSIS — J44.9 CHRONIC OBSTRUCTIVE PULMONARY DISEASE, UNSPECIFIED COPD TYPE (MULTI): ICD-10-CM

## 2024-08-26 DIAGNOSIS — N52.9 ERECTILE DYSFUNCTION, UNSPECIFIED ERECTILE DYSFUNCTION TYPE: ICD-10-CM

## 2024-08-26 DIAGNOSIS — K59.00 CONSTIPATION, UNSPECIFIED CONSTIPATION TYPE: ICD-10-CM

## 2024-08-26 DIAGNOSIS — F41.9 ANXIETY: ICD-10-CM

## 2024-08-26 DIAGNOSIS — F17.200 SMOKING ADDICTION: ICD-10-CM

## 2024-08-26 PROCEDURE — 99214 OFFICE O/P EST MOD 30 MIN: CPT | Performed by: REGISTERED NURSE

## 2024-08-26 PROCEDURE — 3074F SYST BP LT 130 MM HG: CPT | Performed by: REGISTERED NURSE

## 2024-08-26 PROCEDURE — 3078F DIAST BP <80 MM HG: CPT | Performed by: REGISTERED NURSE

## 2024-08-26 PROCEDURE — 3044F HG A1C LEVEL LT 7.0%: CPT | Performed by: REGISTERED NURSE

## 2024-08-26 PROCEDURE — 3008F BODY MASS INDEX DOCD: CPT | Performed by: REGISTERED NURSE

## 2024-08-26 RX ORDER — BUPROPION HYDROCHLORIDE 300 MG/1
TABLET ORAL
COMMUNITY
Start: 2024-07-29

## 2024-08-26 RX ORDER — SEMAGLUTIDE 1.34 MG/ML
2 INJECTION, SOLUTION SUBCUTANEOUS
Qty: 3 ML | Refills: 2 | Status: SHIPPED | OUTPATIENT
Start: 2024-08-26

## 2024-08-26 RX ORDER — DULOXETIN HYDROCHLORIDE 60 MG/1
60 CAPSULE, DELAYED RELEASE ORAL 2 TIMES DAILY
Qty: 60 CAPSULE | Refills: 2 | Status: SHIPPED | OUTPATIENT
Start: 2024-08-26

## 2024-08-26 RX ORDER — DOCUSATE SODIUM 100 MG/1
100 CAPSULE, LIQUID FILLED ORAL 2 TIMES DAILY
COMMUNITY
End: 2024-08-26 | Stop reason: SDUPTHER

## 2024-08-26 RX ORDER — BUPROPION HYDROCHLORIDE 150 MG/1
TABLET ORAL
COMMUNITY
Start: 2024-07-29 | End: 2024-08-26 | Stop reason: WASHOUT

## 2024-08-26 RX ORDER — TRAZODONE HYDROCHLORIDE 150 MG/1
TABLET ORAL
COMMUNITY
Start: 2024-07-29

## 2024-08-26 RX ORDER — SILDENAFIL 100 MG/1
100 TABLET, FILM COATED ORAL DAILY PRN
Qty: 12 TABLET | Refills: 3 | Status: SHIPPED | OUTPATIENT
Start: 2024-08-26 | End: 2025-08-26

## 2024-08-26 RX ORDER — BLOOD-GLUCOSE METER
EACH MISCELLANEOUS
Qty: 50 EACH | Refills: 2 | Status: SHIPPED | OUTPATIENT
Start: 2024-08-26 | End: 2025-08-26

## 2024-08-26 RX ORDER — DOCUSATE SODIUM 100 MG/1
100 CAPSULE, LIQUID FILLED ORAL 2 TIMES DAILY
Qty: 60 CAPSULE | Refills: 1 | Status: SHIPPED | OUTPATIENT
Start: 2024-08-26

## 2024-08-26 RX ORDER — MICONAZOLE NITRATE 2 %
2 CREAM (GRAM) TOPICAL EVERY 2 HOUR PRN
Qty: 100 EACH | Refills: 0 | Status: SHIPPED | OUTPATIENT
Start: 2024-08-26

## 2024-08-26 ASSESSMENT — ENCOUNTER SYMPTOMS
RHINORRHEA: 0
FEVER: 0
NAUSEA: 0
FREQUENCY: 0
EYE REDNESS: 0
WOUND: 0
VOMITING: 0
ABDOMINAL PAIN: 0
HEADACHES: 0
DIARRHEA: 0
NERVOUS/ANXIOUS: 0
SHORTNESS OF BREATH: 0
DIFFICULTY URINATING: 0
CONFUSION: 0
COUGH: 0
CONSTIPATION: 0
DIZZINESS: 0
CHILLS: 0
EYE DISCHARGE: 0
WEAKNESS: 0
WHEEZING: 0

## 2024-08-30 ENCOUNTER — APPOINTMENT (OUTPATIENT)
Dept: RADIOLOGY | Facility: HOSPITAL | Age: 54
End: 2024-08-30
Payer: MEDICARE

## 2024-08-30 ENCOUNTER — APPOINTMENT (OUTPATIENT)
Dept: CARDIOLOGY | Facility: HOSPITAL | Age: 54
End: 2024-08-30
Payer: MEDICARE

## 2024-08-30 ENCOUNTER — HOSPITAL ENCOUNTER (EMERGENCY)
Facility: HOSPITAL | Age: 54
Discharge: HOME | End: 2024-08-30
Attending: EMERGENCY MEDICINE
Payer: MEDICARE

## 2024-08-30 VITALS
BODY MASS INDEX: 30.3 KG/M2 | SYSTOLIC BLOOD PRESSURE: 133 MMHG | OXYGEN SATURATION: 96 % | HEIGHT: 70 IN | RESPIRATION RATE: 16 BRPM | DIASTOLIC BLOOD PRESSURE: 71 MMHG | TEMPERATURE: 99 F | HEART RATE: 90 BPM | WEIGHT: 211.64 LBS

## 2024-08-30 DIAGNOSIS — G89.29 CHRONIC MIDLINE THORACIC BACK PAIN: ICD-10-CM

## 2024-08-30 DIAGNOSIS — M54.6 CHRONIC MIDLINE THORACIC BACK PAIN: ICD-10-CM

## 2024-08-30 DIAGNOSIS — J32.3 SPHENOID SINUSITIS, UNSPECIFIED CHRONICITY: ICD-10-CM
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
## 2024-08-30 DIAGNOSIS — M54.2 NECK PAIN: ICD-10-CM

## 2024-08-30 LAB
ALBUMIN SERPL BCP-MCNC: 4.7 G/DL (ref 3.4–5)
ALP SERPL-CCNC: 117 U/L (ref 33–120)
ALT SERPL W P-5'-P-CCNC: 20 U/L (ref 10–52)
AMPHETAMINES UR QL SCN: NORMAL
ANION GAP SERPL CALC-SCNC: 16 MMOL/L (ref 10–20)
APPEARANCE UR: CLEAR
AST SERPL W P-5'-P-CCNC: 18 U/L (ref 9–39)
BARBITURATES UR QL SCN: NORMAL
BASOPHILS # BLD AUTO: 0.12 X10*3/UL (ref 0–0.1)
BASOPHILS NFR BLD AUTO: 1.3 %
BENZODIAZ UR QL SCN: NORMAL
BILIRUB SERPL-MCNC: 1 MG/DL (ref 0–1.2)
BILIRUB UR STRIP.AUTO-MCNC: NEGATIVE MG/DL
BUN SERPL-MCNC: 14 MG/DL (ref 6–23)
BZE UR QL SCN: NORMAL
CALCIUM SERPL-MCNC: 10.1 MG/DL (ref 8.6–10.3)
CANNABINOIDS UR QL SCN: NORMAL
CARDIAC TROPONIN I PNL SERPL HS: 4 NG/L (ref 0–20)
CHLORIDE SERPL-SCNC: 103 MMOL/L (ref 98–107)
CO2 SERPL-SCNC: 24 MMOL/L (ref 21–32)
COLOR UR: YELLOW
CREAT SERPL-MCNC: 0.87 MG/DL (ref 0.5–1.3)
EGFRCR SERPLBLD CKD-EPI 2021: >90 ML/MIN/1.73M*2
EOSINOPHIL # BLD AUTO: 0.53 X10*3/UL (ref 0–0.7)
EOSINOPHIL NFR BLD AUTO: 5.6 %
ERYTHROCYTE [DISTWIDTH] IN BLOOD BY AUTOMATED COUNT: 14.3 % (ref 11.5–14.5)
ETHANOL SERPL-MCNC: <10 MG/DL
FENTANYL+NORFENTANYL UR QL SCN: NORMAL
GLUCOSE SERPL-MCNC: 107 MG/DL (ref 74–99)
GLUCOSE UR STRIP.AUTO-MCNC: NEGATIVE MG/DL
HCT VFR BLD AUTO: 50.1 % (ref 41–52)
HGB BLD-MCNC: 17.3 G/DL (ref 13.5–17.5)
HOLD SPECIMEN: NORMAL
IMM GRANULOCYTES # BLD AUTO: 0.02 X10*3/UL (ref 0–0.7)
IMM GRANULOCYTES NFR BLD AUTO: 0.2 % (ref 0–0.9)
INR PPP: 1.1 (ref 0.9–1.1)
KETONES UR STRIP.AUTO-MCNC: NEGATIVE MG/DL
LEUKOCYTE ESTERASE UR QL STRIP.AUTO: NEGATIVE
LYMPHOCYTES # BLD AUTO: 3.37 X10*3/UL (ref 1.2–4.8)
LYMPHOCYTES NFR BLD AUTO: 35.8 %
MCH RBC QN AUTO: 30.2 PG (ref 26–34)
MCHC RBC AUTO-ENTMCNC: 34.5 G/DL (ref 32–36)
MCV RBC AUTO: 88 FL (ref 80–100)
METHADONE UR QL SCN: NORMAL
MONOCYTES # BLD AUTO: 0.96 X10*3/UL (ref 0.1–1)
MONOCYTES NFR BLD AUTO: 10.2 %
NEUTROPHILS # BLD AUTO: 4.42 X10*3/UL (ref 1.2–7.7)
NEUTROPHILS NFR BLD AUTO: 46.9 %
NITRITE UR QL STRIP.AUTO: NEGATIVE
NRBC BLD-RTO: 0 /100 WBCS (ref 0–0)
OPIATES UR QL SCN: NORMAL
OXYCODONE+OXYMORPHONE UR QL SCN: NORMAL
PCP UR QL SCN: NORMAL
PH UR STRIP.AUTO: 6 [PH]
PLATELET # BLD AUTO: 303 X10*3/UL (ref 150–450)
POTASSIUM SERPL-SCNC: 3.9 MMOL/L (ref 3.5–5.3)
PROT SERPL-MCNC: 8.2 G/DL (ref 6.4–8.2)
PROT UR STRIP.AUTO-MCNC: NEGATIVE MG/DL
PROTHROMBIN TIME: 12 SECONDS (ref 9.8–12.8)
RBC # BLD AUTO: 5.72 X10*6/UL (ref 4.5–5.9)
RBC # UR STRIP.AUTO: NEGATIVE /UL
SODIUM SERPL-SCNC: 139 MMOL/L (ref 136–145)
SP GR UR STRIP.AUTO: 1.02
UROBILINOGEN UR STRIP.AUTO-MCNC: <2 MG/DL
WBC # BLD AUTO: 9.4 X10*3/UL (ref 4.4–11.3)

## 2024-08-30 PROCEDURE — 72128 CT CHEST SPINE W/O DYE: CPT | Mod: RCN

## 2024-08-30 PROCEDURE — 72170 X-RAY EXAM OF PELVIS: CPT | Mod: FOREIGN READ | Performed by: RADIOLOGY

## 2024-08-30 PROCEDURE — 85025 COMPLETE CBC W/AUTO DIFF WBC: CPT | Performed by: EMERGENCY MEDICINE

## 2024-08-30 PROCEDURE — 70450 CT HEAD/BRAIN W/O DYE: CPT | Performed by: RADIOLOGY

## 2024-08-30 PROCEDURE — 71260 CT THORAX DX C+: CPT | Mod: RCN | Performed by: RADIOLOGY

## 2024-08-30 PROCEDURE — 84484 ASSAY OF TROPONIN QUANT: CPT | Performed by: EMERGENCY MEDICINE

## 2024-08-30 PROCEDURE — 80307 DRUG TEST PRSMV CHEM ANLYZR: CPT | Performed by: EMERGENCY MEDICINE

## 2024-08-30 PROCEDURE — 96360 HYDRATION IV INFUSION INIT: CPT | Mod: 59

## 2024-08-30 PROCEDURE — 81003 URINALYSIS AUTO W/O SCOPE: CPT | Performed by: EMERGENCY MEDICINE

## 2024-08-30 PROCEDURE — 70450 CT HEAD/BRAIN W/O DYE: CPT

## 2024-08-30 PROCEDURE — 2500000004 HC RX 250 GENERAL PHARMACY W/ HCPCS (ALT 636 FOR OP/ED): Performed by: EMERGENCY MEDICINE

## 2024-08-30 PROCEDURE — 74177 CT ABD & PELVIS W/CONTRAST: CPT

## 2024-08-30 PROCEDURE — 80053 COMPREHEN METABOLIC PANEL: CPT | Performed by: EMERGENCY MEDICINE

## 2024-08-30 PROCEDURE — 72131 CT LUMBAR SPINE W/O DYE: CPT | Mod: RCN | Performed by: RADIOLOGY

## 2024-08-30 PROCEDURE — 96361 HYDRATE IV INFUSION ADD-ON: CPT

## 2024-08-30 PROCEDURE — 93005 ELECTROCARDIOGRAM TRACING: CPT

## 2024-08-30 PROCEDURE — 36415 COLL VENOUS BLD VENIPUNCTURE: CPT | Performed by: EMERGENCY MEDICINE

## 2024-08-30 PROCEDURE — 90715 TDAP VACCINE 7 YRS/> IM: CPT | Performed by: EMERGENCY MEDICINE

## 2024-08-30 PROCEDURE — 72131 CT LUMBAR SPINE W/O DYE: CPT | Mod: RCN

## 2024-08-30 PROCEDURE — 74177 CT ABD & PELVIS W/CONTRAST: CPT | Mod: RCN | Performed by: RADIOLOGY

## 2024-08-30 PROCEDURE — 99285 EMERGENCY DEPT VISIT HI MDM: CPT | Mod: 25

## 2024-08-30 PROCEDURE — 72125 CT NECK SPINE W/O DYE: CPT

## 2024-08-30 PROCEDURE — 85610 PROTHROMBIN TIME: CPT | Performed by: EMERGENCY MEDICINE

## 2024-08-30 PROCEDURE — 90471 IMMUNIZATION ADMIN: CPT | Performed by: EMERGENCY MEDICINE

## 2024-08-30 PROCEDURE — 72125 CT NECK SPINE W/O DYE: CPT | Performed by: RADIOLOGY

## 2024-08-30 PROCEDURE — 2550000001 HC RX 255 CONTRASTS: Mod: SE | Performed by: EMERGENCY MEDICINE

## 2024-08-30 PROCEDURE — 72128 CT CHEST SPINE W/O DYE: CPT | Mod: RCN | Performed by: RADIOLOGY

## 2024-08-30 PROCEDURE — 72170 X-RAY EXAM OF PELVIS: CPT

## 2024-08-30 PROCEDURE — 82077 ASSAY SPEC XCP UR&BREATH IA: CPT | Performed by: EMERGENCY MEDICINE

## 2024-08-30 RX ORDER — AZITHROMYCIN 500 MG/1
500 TABLET, FILM COATED ORAL DAILY
Qty: 5 TABLET | Refills: 0 | Status: SHIPPED | OUTPATIENT
Start: 2024-08-30 | End: 2024-09-04

## 2024-08-30 RX ORDER — SODIUM CHLORIDE 9 MG/ML
100 INJECTION, SOLUTION INTRAVENOUS CONTINUOUS
Status: DISCONTINUED | OUTPATIENT
Start: 2024-08-30 | End: 2024-08-30 | Stop reason: HOSPADM

## 2024-08-30 RX ORDER — BUPRENORPHINE HYDROCHLORIDE AND NALOXONE HYDROCHLORIDE DIHYDRATE 8; 2 MG/1; MG/1
1 TABLET SUBLINGUAL 3 TIMES DAILY
COMMUNITY

## 2024-08-30 ASSESSMENT — PAIN DESCRIPTION - LOCATION: LOCATION: CHEST

## 2024-08-30 ASSESSMENT — PAIN SCALES - GENERAL: PAINLEVEL_OUTOF10: 10 - WORST POSSIBLE PAIN

## 2024-08-30 ASSESSMENT — COLUMBIA-SUICIDE SEVERITY RATING SCALE - C-SSRS
1. IN THE PAST MONTH, HAVE YOU WISHED YOU WERE DEAD OR WISHED YOU COULD GO TO SLEEP AND NOT WAKE UP?: NO
6. HAVE YOU EVER DONE ANYTHING, STARTED TO DO ANYTHING, OR PREPARED TO DO ANYTHING TO END YOUR LIFE?: NO
2. HAVE YOU ACTUALLY HAD ANY THOUGHTS OF KILLING YOURSELF?: NO

## 2024-08-30 ASSESSMENT — PAIN DESCRIPTION - ORIENTATION: ORIENTATION: RIGHT

## 2024-08-30 ASSESSMENT — PAIN - FUNCTIONAL ASSESSMENT: PAIN_FUNCTIONAL_ASSESSMENT: 0-10

## 2024-08-30 ASSESSMENT — PAIN DESCRIPTION - DESCRIPTORS: DESCRIPTORS: ACHING
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
## 2024-08-30 NOTE — DISCHARGE INSTRUCTIONS
Continue home medications.    Return there for any worsening symptoms or concerns.    Follow-up with your primary care provider Tuesday or Wednesday for recheck.

## 2024-09-03 ENCOUNTER — HOSPITAL ENCOUNTER (OUTPATIENT)
Dept: CARDIOLOGY | Facility: HOSPITAL | Age: 54
Discharge: HOME | End: 2024-09-03
Payer: MEDICARE

## 2024-09-03 LAB
ATRIAL RATE: 85 BPM
ATRIAL RATE: 92 BPM
P AXIS: 39 DEGREES
P AXIS: 55 DEGREES
P OFFSET: 202 MS
P OFFSET: 205 MS
P ONSET: 137 MS
P ONSET: 141 MS
PR INTERVAL: 164 MS
PR INTERVAL: 170 MS
Q ONSET: 222 MS
Q ONSET: 223 MS
QRS COUNT: 14 BEATS
QRS COUNT: 15 BEATS
QRS DURATION: 86 MS
QRS DURATION: 90 MS
QT INTERVAL: 378 MS
QT INTERVAL: 388 MS
QTC CALCULATION(BAZETT): 461 MS
QTC CALCULATION(BAZETT): 467 MS
QTC FREDERICIA: 435 MS
QTC FREDERICIA: 435 MS
R AXIS: 43 DEGREES
R AXIS: 60 DEGREES
T AXIS: 23 DEGREES
T AXIS: 39 DEGREES
T OFFSET: 411 MS
T OFFSET: 417 MS
VENTRICULAR RATE: 85 BPM
VENTRICULAR RATE: 92 BPM

## 2024-09-03 PROCEDURE — 93005 ELECTROCARDIOGRAM TRACING: CPT

## 2024-09-19 DIAGNOSIS — G89.29 CHRONIC MIDLINE LOW BACK PAIN WITHOUT SCIATICA: ICD-10-CM

## 2024-09-19 DIAGNOSIS — M54.50 CHRONIC MIDLINE LOW BACK PAIN WITHOUT SCIATICA: ICD-10-CM

## 2024-09-19 RX ORDER — MELOXICAM 15 MG/1
15 TABLET ORAL DAILY
Qty: 30 TABLET | Refills: 0 | Status: SHIPPED | OUTPATIENT
Start: 2024-09-19

## 2024-10-07 ENCOUNTER — TELEPHONE (OUTPATIENT)
Dept: PHYSICAL MEDICINE AND REHAB | Facility: CLINIC | Age: 54
End: 2024-10-07
Payer: MEDICARE

## 2024-10-07 NOTE — TELEPHONE ENCOUNTER
"Patient LVM asking if his appt is at \"the new or old office\"  I sent him a [employer] message and mail via [employer] previously with this info and asked him to call me back with a working phone number.  The number he left on  [phone] is not a working number.  "

## 2024-10-08 DIAGNOSIS — G89.29 CHRONIC MIDLINE LOW BACK PAIN WITHOUT SCIATICA: ICD-10-CM

## 2024-10-08 DIAGNOSIS — E66.812 CLASS 2 OBESITY WITH ALVEOLAR HYPOVENTILATION WITHOUT SERIOUS COMORBIDITY WITH BODY MASS INDEX (BMI) OF 36.0 TO 36.9 IN ADULT: ICD-10-CM

## 2024-10-08 DIAGNOSIS — K59.00 CONSTIPATION, UNSPECIFIED CONSTIPATION TYPE: ICD-10-CM

## 2024-10-08 DIAGNOSIS — E66.2 CLASS 2 OBESITY WITH ALVEOLAR HYPOVENTILATION WITHOUT SERIOUS COMORBIDITY WITH BODY MASS INDEX (BMI) OF 36.0 TO 36.9 IN ADULT: ICD-10-CM

## 2024-10-08 DIAGNOSIS — E78.2 MODERATE MIXED HYPERLIPIDEMIA NOT REQUIRING STATIN THERAPY: ICD-10-CM

## 2024-10-08 DIAGNOSIS — M54.50 CHRONIC MIDLINE LOW BACK PAIN WITHOUT SCIATICA: ICD-10-CM

## 2024-10-08 DIAGNOSIS — E11.9 TYPE 2 DIABETES MELLITUS WITHOUT COMPLICATION, WITHOUT LONG-TERM CURRENT USE OF INSULIN (MULTI): ICD-10-CM

## 2024-10-08 DIAGNOSIS — R60.0 PERIPHERAL EDEMA: ICD-10-CM

## 2024-10-08 RX ORDER — MELOXICAM 15 MG/1
15 TABLET ORAL DAILY
Qty: 30 TABLET | Refills: 0 | Status: SHIPPED | OUTPATIENT
Start: 2024-10-08

## 2024-10-08 RX ORDER — FUROSEMIDE 20 MG/1
20 TABLET ORAL DAILY
Qty: 30 TABLET | Refills: 1 | Status: SHIPPED | OUTPATIENT
Start: 2024-10-08

## 2024-10-08 RX ORDER — ATORVASTATIN CALCIUM 40 MG/1
40 TABLET, FILM COATED ORAL DAILY
Qty: 30 TABLET | Refills: 2 | Status: SHIPPED | OUTPATIENT
Start: 2024-10-08

## 2024-10-08 RX ORDER — SEMAGLUTIDE 2.68 MG/ML
INJECTION, SOLUTION SUBCUTANEOUS
Qty: 3 ML | Refills: 1 | Status: SHIPPED | OUTPATIENT
Start: 2024-10-08

## 2024-10-08 RX ORDER — DOCUSATE SODIUM 100 MG/1
100 CAPSULE, LIQUID FILLED ORAL 2 TIMES DAILY
Qty: 60 CAPSULE | Refills: 1 | Status: SHIPPED | OUTPATIENT
Start: 2024-10-08

## 2024-10-10 ENCOUNTER — APPOINTMENT (OUTPATIENT)
Dept: PHYSICAL MEDICINE AND REHAB | Facility: CLINIC | Age: 54
End: 2024-10-10
Payer: MEDICARE

## 2024-10-15 ENCOUNTER — TELEPHONE (OUTPATIENT)
Dept: PRIMARY CARE | Facility: CLINIC | Age: 54
End: 2024-10-15
Payer: MEDICARE

## 2024-10-15 DIAGNOSIS — R11.0 NAUSEA: Primary | ICD-10-CM

## 2024-10-15 RX ORDER — ONDANSETRON 4 MG/1
4 TABLET, FILM COATED ORAL EVERY 8 HOURS PRN
Qty: 20 TABLET | Refills: 0 | Status: SHIPPED | OUTPATIENT
Start: 2024-10-15 | End: 2024-10-22

## 2024-10-15 NOTE — TELEPHONE ENCOUNTER
Pts mother called in stating since increasing the Ozempic he has had some side effects. Nausea and slurring his words. He is passing all his drug tests so she thinks it may be this. Too high of a dose maybe. It has been 2 weeks. Mom is just worried about him and his demeanor as he is driving a distance for work training and he has complained that he does not feel himself.

## 2024-10-15 NOTE — TELEPHONE ENCOUNTER
No I don't think this is the reason for it, he can decrease the dose but I'm very concerned about the slurred speech and his change of mental status, I would like him to go to the ER for eval

## 2024-10-15 NOTE — TELEPHONE ENCOUNTER
Spoke to pts mother and she states she has tried to get him to and he wouldn't go but maybe now she can get him to go with your advice. He is asking for a refill on his zofran too if possible.

## 2024-10-21 DIAGNOSIS — K21.9 GASTROESOPHAGEAL REFLUX DISEASE, UNSPECIFIED WHETHER ESOPHAGITIS PRESENT: ICD-10-CM

## 2024-10-21 RX ORDER — SUCRALFATE 1 G/1
1 TABLET ORAL
Qty: 60 TABLET | Refills: 1 | Status: SHIPPED | OUTPATIENT
Start: 2024-10-21

## 2024-10-28 ENCOUNTER — APPOINTMENT (OUTPATIENT)
Dept: PRIMARY CARE | Facility: CLINIC | Age: 54
End: 2024-10-28
Payer: MEDICARE

## 2024-11-12 DIAGNOSIS — M54.50 CHRONIC MIDLINE LOW BACK PAIN WITHOUT SCIATICA: ICD-10-CM

## 2024-11-12 DIAGNOSIS — G89.29 CHRONIC MIDLINE LOW BACK PAIN WITHOUT SCIATICA: ICD-10-CM

## 2024-11-12 RX ORDER — MELOXICAM 15 MG/1
15 TABLET ORAL DAILY
Qty: 30 TABLET | Refills: 0 | Status: SHIPPED | OUTPATIENT
Start: 2024-11-12

## 2024-11-19 ENCOUNTER — TELEPHONE (OUTPATIENT)
Dept: PHYSICAL MEDICINE AND REHAB | Facility: CLINIC | Age: 54
End: 2024-11-19
Payer: MEDICARE

## 2024-11-19 DIAGNOSIS — G62.9 NEUROPATHY: ICD-10-CM

## 2024-11-19 DIAGNOSIS — M54.12 CERVICAL RADICULOPATHY, CHRONIC: ICD-10-CM

## 2024-11-19 RX ORDER — PREGABALIN 200 MG/1
200 CAPSULE ORAL 3 TIMES DAILY
Qty: 90 CAPSULE | Refills: 0 | Status: SHIPPED | OUTPATIENT
Start: 2024-11-19

## 2024-11-19 NOTE — TELEPHONE ENCOUNTER
----- Message from Aleah Begum sent at 11/19/2024  1:21 PM EST -----  Please let him know that I will renew his Lyrica 1 time, but he is due for a contract renewal.  He can come in for this, or we can mail it to him, what ever he prefers.

## 2024-12-07 DIAGNOSIS — E66.812 CLASS 2 OBESITY WITH ALVEOLAR HYPOVENTILATION WITHOUT SERIOUS COMORBIDITY WITH BODY MASS INDEX (BMI) OF 36.0 TO 36.9 IN ADULT: ICD-10-CM

## 2024-12-07 DIAGNOSIS — R60.0 PERIPHERAL EDEMA: ICD-10-CM

## 2024-12-07 DIAGNOSIS — E66.2 CLASS 2 OBESITY WITH ALVEOLAR HYPOVENTILATION WITHOUT SERIOUS COMORBIDITY WITH BODY MASS INDEX (BMI) OF 36.0 TO 36.9 IN ADULT: ICD-10-CM

## 2024-12-07 DIAGNOSIS — M54.50 CHRONIC MIDLINE LOW BACK PAIN WITHOUT SCIATICA: ICD-10-CM

## 2024-12-07 DIAGNOSIS — G89.29 CHRONIC MIDLINE LOW BACK PAIN WITHOUT SCIATICA: ICD-10-CM

## 2024-12-07 DIAGNOSIS — K59.00 CONSTIPATION, UNSPECIFIED CONSTIPATION TYPE: ICD-10-CM

## 2024-12-07 DIAGNOSIS — K21.9 GASTROESOPHAGEAL REFLUX DISEASE, UNSPECIFIED WHETHER ESOPHAGITIS PRESENT: ICD-10-CM

## 2024-12-07 DIAGNOSIS — E11.9 TYPE 2 DIABETES MELLITUS WITHOUT COMPLICATION, WITHOUT LONG-TERM CURRENT USE OF INSULIN (MULTI): ICD-10-CM

## 2024-12-07 RX ORDER — MELOXICAM 15 MG/1
15 TABLET ORAL DAILY
Qty: 30 TABLET | Refills: 0 | Status: SHIPPED | OUTPATIENT
Start: 2024-12-07

## 2024-12-09 DIAGNOSIS — G57.21 FEMORAL NEUROPATHY OF RIGHT LOWER EXTREMITY: ICD-10-CM

## 2024-12-09 RX ORDER — FUROSEMIDE 20 MG/1
20 TABLET ORAL DAILY
Qty: 30 TABLET | Refills: 1 | Status: SHIPPED | OUTPATIENT
Start: 2024-12-09

## 2024-12-09 RX ORDER — DOCUSATE SODIUM 100 MG/1
100 CAPSULE, LIQUID FILLED ORAL 2 TIMES DAILY
Qty: 60 CAPSULE | Refills: 1 | Status: SHIPPED | OUTPATIENT
Start: 2024-12-09

## 2024-12-09 RX ORDER — SEMAGLUTIDE 2.68 MG/ML
INJECTION, SOLUTION SUBCUTANEOUS
Qty: 3 ML | Refills: 1 | Status: SHIPPED | OUTPATIENT
Start: 2024-12-09

## 2024-12-09 RX ORDER — NORTRIPTYLINE HYDROCHLORIDE 50 MG/1
50 CAPSULE ORAL NIGHTLY
Qty: 30 CAPSULE | Refills: 3 | Status: SHIPPED | OUTPATIENT
Start: 2024-12-09 | End: 2025-04-08

## 2024-12-09 RX ORDER — SUCRALFATE 1 G/1
1 TABLET ORAL
Qty: 60 TABLET | Refills: 1 | Status: SHIPPED | OUTPATIENT
Start: 2024-12-09

## 2025-01-07 DIAGNOSIS — M54.50 CHRONIC MIDLINE LOW BACK PAIN WITHOUT SCIATICA: ICD-10-CM

## 2025-01-07 DIAGNOSIS — E78.2 MODERATE MIXED HYPERLIPIDEMIA NOT REQUIRING STATIN THERAPY: ICD-10-CM

## 2025-01-07 DIAGNOSIS — G89.29 CHRONIC MIDLINE LOW BACK PAIN WITHOUT SCIATICA: ICD-10-CM

## 2025-01-07 RX ORDER — ATORVASTATIN CALCIUM 40 MG/1
40 TABLET, FILM COATED ORAL DAILY
Qty: 30 TABLET | Refills: 2 | Status: SHIPPED | OUTPATIENT
Start: 2025-01-07

## 2025-01-07 RX ORDER — MELOXICAM 15 MG/1
15 TABLET ORAL DAILY
Qty: 30 TABLET | Refills: 0 | Status: SHIPPED | OUTPATIENT
Start: 2025-01-07

## 2025-02-05 NOTE — PROGRESS NOTES
Subjective   Patient ID: Augie Tucker III is a 54 y.o. male who presents for Medicare Annual Wellness Visit Subsequent (Spider bite, left hand, noticed it last night; hip pain; hearing loss in left ear; ).    HPI     Augie is here for follow up. Concerns of hip pain, spider bite, loss of hearing left ear.     Spider bite- left hand above his thumb, states it looks better, states it looked black last night, when he noticed it, states its red, warm, swollen, and tender.     Hip pain-Feels that they are grinding. Has jimenez in the past. Eventually went away.   Was following with pain management in [place]. They left the area needs new referral. Asking to fill his lyrica this one time.     Hearing loss- left ear, loss of hearing. Feeling claustrophobic. Feels that there is a lot of pressure in his ear, denies fevers, drainage, or pain.  This started About 2 months ago.     Sinus issues: Had sinus surgery his headaches are better.  He is mass was biopsied     HTN: DBP elevated today, has been monitoring his bp at home, taking lasix for edema, no longer taking metoprolol due to episodes of dizziness with standing.      Weight gain: Currently on 2 mg Ozempic. His mom had bariatric surgery, he has a gym downstairs, doing bike and walking on treadmill.      Hep C: Taken medication for treatment Epclusa Follows with specialist     RLS: He is constantly shaking his legs, started about a month ago. It used to be a side effect of medications. States he has walked on the treadmill before to try an help with this.      Right shoulder and back causing him pain, going to talk to Dr. Begum about this. No specific injury, taking his ibuprofen as needed, Voltaren does not help.      Knee pain: States he has been having right knee pain, has tried knee sleeve before and this was not helping. Would like a knee brace to help.      BLE: He is having swelling, he was started on low dose Lasix to take prior to this follow up and they have not  "been helping. He is not able to wear his regular shoes, states he has not increased his urination. He is having low back pain also. Denies blood in urine, pain with urinating. Gets urine done daily, he is going to ask them to check urine culture next time they do it.      ED: He tried sildenafil and this did not help much. He was switched over tadalafil 10mg is working better for him and he was would like a refill.      Insomnia: States that zyprexa worked for him in the past for sleeping, he has to go to bed at 9pm but he has been up all night long. Increasing his hydroxazine to 50mg today.   He was given referral for sleep study before and never followed through     GERD: Waiting for appointment with GI. Dr. Woodruff October 25th. Needing prilosec for heartburn. He states he had one about 3 years ago. He is having issues with stomach burning, he is still losing weight. inconsistent, issues with diarrhea and constipation.     Shoulder pain: Out of Voltaren, interested in something else He was seeing Dr. Begum before for his back and neck and shoulder pains. Cant use his left shoulder The steroid helped before, cant sleep on it. This could be worsening his sleep. Lidocaine patches have helped in the past. Would like another round of medrol dose pack as this has also helped.      Neck Pain: He has a follow up with Neuro. He is seeing Aleah Avalos on Aug. 11th. He states its causing \"combustion in his head\", Causing blurred vision. Still feeling like someone is standing on his chest at times. He was told that he might have bone spurrs that could be causing issues in his neck. He was having left arm numbness. States that he was told he could see neurosurgery, needing referral. He was started on naproxen last time, Its not helping, has been taking Indocin from the hospital for a week. He is out of ibuprofen, only had it for 18 days 800mg tabs, this has helped in the past.   He is having pain in the back of his neck, " states he feels popping and pain up the back of his neck. Also has hand numbness and tingling. Happening daily. States that he was able to bring his carpal tunnel braces for this. He has been given ibuprofen there for pain/headache and this is not helping much. States naproxen has helped in the past.      Going to see  With [facility]. Dr. Batista Stopped the sumatriptan and taking auto injection once per month. Needing to see new neurology. He now states that when he closes his eyes he is losing his balance.      Chest pains: States this has improved a lot, bp has improved also.      He had some TIA's before in the past, he was worried about this.      All other systems reviewed and negative for complaint unless stated above.       Objective   BP (!) 138/92 (BP Location: Right arm, Patient Position: Sitting, BP Cuff Size: Adult)   Pulse 82   Wt 96.5 kg (212 lb 12.8 oz)   BMI 30.53 kg/m²     Constitutional:  Negative for chills and fever.   HENT:  Negative for congestion, ear pain and rhinorrhea.    Eyes:  Negative for discharge and redness.   Respiratory:  Negative for cough, shortness of breath and wheezing.    Cardiovascular:  Negative for chest pain and leg swelling.   Gastrointestinal:  Negative for abdominal pain, constipation, diarrhea, nausea and vomiting.   Genitourinary:  Negative for difficulty urinating, frequency and urgency.   Musculoskeletal:  Negative for gait problem.   Skin:  Negative for rash and wound.   Neurological:  Negative for dizziness, weakness and headaches.   Psychiatric/Behavioral:  Negative for confusion. The patient is not nervous/anxious.      Physical Exam  Vitals reviewed.   Constitutional:       Appearance: Normal appearance.   HENT:      Head: Normocephalic.      Right Ear: Tympanic membrane, ear canal and external ear normal.      Left Ear: Tympanic membrane, ear canal and external ear normal.      Nose: Nose normal.      Mouth/Throat:      Mouth: Mucous membranes are moist.       Pharynx: Oropharynx is clear.   Eyes:      Pupils: Pupils are equal, round, and reactive to light.   Cardiovascular:      Rate and Rhythm: Normal rate and regular rhythm.      Pulses: Normal pulses.      Heart sounds: Normal heart sounds.   Pulmonary:      Effort: Pulmonary effort is normal.      Breath sounds: Normal breath sounds.   Abdominal:      General: Bowel sounds are normal.      Palpations: Abdomen is soft.   Musculoskeletal:      Cervical back: Normal range of motion.   Skin:     General: Skin is warm.      Capillary Refill: Capillary refill takes 2 to 3 seconds.      Findings: Abrasion, erythema, signs of injury, lesion and wound present.   Neurological:      General: No focal deficit present.      Mental Status: He is alert and oriented to person, place, and time.   Psychiatric:         Mood and Affect: Mood normal.         Assessment & Plan  Restless legs syndrome  Refill requip    Neuropathy  Refilled Lyrica one time  OARRS reviewed  Referral to pain management    Anxiety  Refilled cymbalta and hyrdoxyzine    Moderate mixed hyperlipidemia not requiring statin therapy  Refilled lipitor continue 40 mg  Heart healthy diet  Exercise 150 minutes/day  Peripheral edema  Lasix 20 mg daily    Chronic midline low back pain without sciatica  Mobic  Referral to pain management  Primary hypertension  Restart Metoprolol 25 mg     Centrilobular emphysema (Multi)  Trial Singulair  Refilled albuterol and Trelegy Ellipta    Insect bite of left hand, initial encounter    Orders:    cephalexin (Keflex) 500 mg capsule; Take 1 capsule (500 mg) by mouth 2 times a day for 10 days.    mupirocin (Bactroban) 2 % ointment; Apply 1 Application topically 2 times a day for 5 days.    Type 2 diabetes mellitus without complication, without long-term current use of insulin (Multi)  Glucose monitor ordered- his is not working  A1C ordered for 3 months  Continue Ozempic 2 mg    #Cerumen impaction   Rx debrox drop   Can stop in for  flushing if needed.      #Migraines  #Balance issues   Referral to neuro      #Chest pains  #Anxiety  Improved   Lexapro 20mg      #Insomnia   increased hydroxyzine   Seeing Psych with community counseling  sleep study ordered   Dr. Brock   Increase duloxetine today      #GERD  #Abdominal pain  #Diarrhea  #Constipation  GI referral provided   refilled pantorprazole

## 2025-02-07 ENCOUNTER — APPOINTMENT (OUTPATIENT)
Dept: PRIMARY CARE | Facility: CLINIC | Age: 55
End: 2025-02-07
Payer: MEDICARE

## 2025-02-07 ENCOUNTER — TELEPHONE (OUTPATIENT)
Dept: PRIMARY CARE | Facility: CLINIC | Age: 55
End: 2025-02-07

## 2025-02-07 VITALS
WEIGHT: 212.8 LBS | HEART RATE: 82 BPM | SYSTOLIC BLOOD PRESSURE: 138 MMHG | DIASTOLIC BLOOD PRESSURE: 92 MMHG | BODY MASS INDEX: 30.53 KG/M2

## 2025-02-07 DIAGNOSIS — E11.9 TYPE 2 DIABETES MELLITUS WITHOUT COMPLICATION, WITHOUT LONG-TERM CURRENT USE OF INSULIN (MULTI): Primary | ICD-10-CM

## 2025-02-07 DIAGNOSIS — Z13.220 LIPID SCREENING: ICD-10-CM

## 2025-02-07 DIAGNOSIS — I10 PRIMARY HYPERTENSION: ICD-10-CM

## 2025-02-07 DIAGNOSIS — G89.29 CHRONIC MIDLINE LOW BACK PAIN WITHOUT SCIATICA: ICD-10-CM

## 2025-02-07 DIAGNOSIS — M54.50 CHRONIC MIDLINE LOW BACK PAIN WITHOUT SCIATICA: ICD-10-CM

## 2025-02-07 DIAGNOSIS — E11.9 TYPE 2 DIABETES MELLITUS WITHOUT COMPLICATION, WITHOUT LONG-TERM CURRENT USE OF INSULIN (MULTI): ICD-10-CM

## 2025-02-07 DIAGNOSIS — R60.0 PERIPHERAL EDEMA: ICD-10-CM

## 2025-02-07 DIAGNOSIS — R79.9 ABNORMAL FINDING OF BLOOD CHEMISTRY, UNSPECIFIED: ICD-10-CM

## 2025-02-07 DIAGNOSIS — S60.562A INSECT BITE OF LEFT HAND, INITIAL ENCOUNTER: Primary | ICD-10-CM

## 2025-02-07 DIAGNOSIS — J43.2 CENTRILOBULAR EMPHYSEMA (MULTI): ICD-10-CM

## 2025-02-07 DIAGNOSIS — H61.22 IMPACTED CERUMEN OF LEFT EAR: ICD-10-CM

## 2025-02-07 DIAGNOSIS — F41.9 ANXIETY: ICD-10-CM

## 2025-02-07 DIAGNOSIS — R73.03 PREDIABETES: ICD-10-CM

## 2025-02-07 DIAGNOSIS — E78.2 MODERATE MIXED HYPERLIPIDEMIA NOT REQUIRING STATIN THERAPY: ICD-10-CM

## 2025-02-07 DIAGNOSIS — Z13.29 THYROID DISORDER SCREENING: ICD-10-CM
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
## 2025-02-07 DIAGNOSIS — G25.81 RESTLESS LEGS SYNDROME: ICD-10-CM

## 2025-02-07 DIAGNOSIS — G62.9 NEUROPATHY: ICD-10-CM

## 2025-02-07 DIAGNOSIS — M54.12 CERVICAL RADICULOPATHY, CHRONIC: ICD-10-CM

## 2025-02-07 DIAGNOSIS — M25.559 HIP PAIN, UNSPECIFIED LATERALITY: ICD-10-CM

## 2025-02-07 RX ORDER — DULOXETIN HYDROCHLORIDE 60 MG/1
60 CAPSULE, DELAYED RELEASE ORAL 2 TIMES DAILY
Qty: 60 CAPSULE | Refills: 2 | Status: SHIPPED | OUTPATIENT
Start: 2025-02-07

## 2025-02-07 RX ORDER — LANCETS 30 GAUGE
EACH MISCELLANEOUS
Qty: 1 EACH | Refills: 0 | Status: SHIPPED | OUTPATIENT
Start: 2025-02-07

## 2025-02-07 RX ORDER — PREGABALIN 200 MG/1
200 CAPSULE ORAL 3 TIMES DAILY
Qty: 90 CAPSULE | Refills: 0 | Status: SHIPPED | OUTPATIENT
Start: 2025-02-07

## 2025-02-07 RX ORDER — ALBUTEROL SULFATE 90 UG/1
2 INHALANT RESPIRATORY (INHALATION) EVERY 4 HOURS PRN
Qty: 18 G | Refills: 11 | Status: SHIPPED | OUTPATIENT
Start: 2025-02-07 | End: 2025-03-09

## 2025-02-07 RX ORDER — FLUTICASONE FUROATE, UMECLIDINIUM BROMIDE AND VILANTEROL TRIFENATATE 100; 62.5; 25 UG/1; UG/1; UG/1
1 POWDER RESPIRATORY (INHALATION) DAILY
Qty: 60 EACH | Refills: 0 | Status: SHIPPED | OUTPATIENT
Start: 2025-02-07 | End: 2025-04-08

## 2025-02-07 RX ORDER — ROPINIROLE 0.5 MG/1
0.5 TABLET, FILM COATED ORAL DAILY
Qty: 90 TABLET | Refills: 3 | Status: SHIPPED | OUTPATIENT
Start: 2025-02-07 | End: 2026-02-07

## 2025-02-07 RX ORDER — FUROSEMIDE 20 MG/1
20 TABLET ORAL DAILY
Qty: 30 TABLET | Refills: 1 | Status: SHIPPED | OUTPATIENT
Start: 2025-02-07

## 2025-02-07 RX ORDER — ATORVASTATIN CALCIUM 40 MG/1
40 TABLET, FILM COATED ORAL DAILY
Qty: 30 TABLET | Refills: 2 | Status: SHIPPED | OUTPATIENT
Start: 2025-02-07

## 2025-02-07 RX ORDER — METOPROLOL SUCCINATE 25 MG/1
25 TABLET, EXTENDED RELEASE ORAL DAILY
Qty: 90 TABLET | Refills: 3 | Status: SHIPPED | OUTPATIENT
Start: 2025-02-07 | End: 2026-02-07

## 2025-02-07 RX ORDER — HYDROXYZINE PAMOATE 50 MG/1
50 CAPSULE ORAL 3 TIMES DAILY PRN
Qty: 30 CAPSULE | Refills: 3 | Status: SHIPPED | OUTPATIENT
Start: 2025-02-07 | End: 2025-03-19

## 2025-02-07 RX ORDER — MUPIROCIN 20 MG/G
1 OINTMENT TOPICAL 2 TIMES DAILY
Qty: 1 G | Refills: 0 | Status: SHIPPED | OUTPATIENT
Start: 2025-02-07 | End: 2025-02-12

## 2025-02-07 RX ORDER — MONTELUKAST SODIUM 10 MG/1
10 TABLET ORAL NIGHTLY
Qty: 30 TABLET | Refills: 5 | Status: SHIPPED | OUTPATIENT
Start: 2025-02-07 | End: 2025-08-06

## 2025-02-07 RX ORDER — MELOXICAM 15 MG/1
15 TABLET ORAL DAILY
Qty: 30 TABLET | Refills: 0 | Status: SHIPPED | OUTPATIENT
Start: 2025-02-07

## 2025-02-07 RX ORDER — CEPHALEXIN 500 MG/1
500 CAPSULE ORAL 2 TIMES DAILY
Qty: 20 CAPSULE | Refills: 0 | Status: SHIPPED | OUTPATIENT
Start: 2025-02-07 | End: 2025-02-17

## 2025-02-07 ASSESSMENT — ACTIVITIES OF DAILY LIVING (ADL)
DOING_HOUSEWORK: INDEPENDENT
MANAGING_FINANCES: INDEPENDENT
DRESSING: INDEPENDENT
GROCERY_SHOPPING: INDEPENDENT
BATHING: INDEPENDENT
TAKING_MEDICATION: INDEPENDENT

## 2025-02-07 ASSESSMENT — PATIENT HEALTH QUESTIONNAIRE - PHQ9
2. FEELING DOWN, DEPRESSED OR HOPELESS: NOT AT ALL
SUM OF ALL RESPONSES TO PHQ9 QUESTIONS 1 AND 2: 0
1. LITTLE INTEREST OR PLEASURE IN DOING THINGS: NOT AT ALL

## 2025-02-07 NOTE — TELEPHONE ENCOUNTER
Ortho referral placed for hip pain.     Orthopedics:   Milad Hernandez () [phone]  [facility] [phone]  [facility]: [phone]  Augie Julien () [phone]  Michael Centeno ([facility]) [phone]

## 2025-02-07 NOTE — PATIENT INSTRUCTIONS
Pain Management:     [facility] () [phone]  [facility] () [phone]  Jeremiah Pond ([facility]) [phone]

## 2025-02-20 DIAGNOSIS — K59.00 CONSTIPATION, UNSPECIFIED CONSTIPATION TYPE: ICD-10-CM

## 2025-02-20 DIAGNOSIS — M54.12 CERVICAL RADICULOPATHY, CHRONIC: ICD-10-CM

## 2025-02-20 DIAGNOSIS — E11.9 TYPE 2 DIABETES MELLITUS WITHOUT COMPLICATION, WITHOUT LONG-TERM CURRENT USE OF INSULIN (MULTI): ICD-10-CM

## 2025-02-20 DIAGNOSIS — E66.2 CLASS 2 OBESITY WITH ALVEOLAR HYPOVENTILATION WITHOUT SERIOUS COMORBIDITY WITH BODY MASS INDEX (BMI) OF 36.0 TO 36.9 IN ADULT: ICD-10-CM

## 2025-02-20 DIAGNOSIS — R11.0 NAUSEA: ICD-10-CM

## 2025-02-20 DIAGNOSIS — K21.9 GASTROESOPHAGEAL REFLUX DISEASE, UNSPECIFIED WHETHER ESOPHAGITIS PRESENT: ICD-10-CM

## 2025-02-20 DIAGNOSIS — E66.812 CLASS 2 OBESITY WITH ALVEOLAR HYPOVENTILATION WITHOUT SERIOUS COMORBIDITY WITH BODY MASS INDEX (BMI) OF 36.0 TO 36.9 IN ADULT: ICD-10-CM

## 2025-02-20 DIAGNOSIS — S60.562A INSECT BITE OF LEFT HAND, INITIAL ENCOUNTER: ICD-10-CM
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
## 2025-02-20 DIAGNOSIS — G62.9 NEUROPATHY: ICD-10-CM

## 2025-02-20 DIAGNOSIS — J43.2 CENTRILOBULAR EMPHYSEMA (MULTI): ICD-10-CM

## 2025-02-21 RX ORDER — PREGABALIN 200 MG/1
200 CAPSULE ORAL 3 TIMES DAILY
Qty: 90 CAPSULE | Refills: 0 | Status: SHIPPED | OUTPATIENT
Start: 2025-02-21 | End: 2025-03-23

## 2025-02-21 RX ORDER — MUPIROCIN 20 MG/G
OINTMENT TOPICAL 2 TIMES DAILY
Qty: 22 G | Refills: 0 | Status: SHIPPED | OUTPATIENT
Start: 2025-02-21 | End: 2025-02-26

## 2025-02-21 RX ORDER — ONDANSETRON 4 MG/1
TABLET, FILM COATED ORAL
Qty: 20 TABLET | Refills: 0 | Status: SHIPPED | OUTPATIENT
Start: 2025-02-21

## 2025-02-21 RX ORDER — FLUTICASONE FUROATE, UMECLIDINIUM BROMIDE AND VILANTEROL TRIFENATATE 100; 62.5; 25 UG/1; UG/1; UG/1
1 POWDER RESPIRATORY (INHALATION) DAILY
Qty: 60 EACH | Refills: 0 | Status: SHIPPED | OUTPATIENT
Start: 2025-02-21

## 2025-02-21 RX ORDER — SUCRALFATE 1 G/1
1 TABLET ORAL
Qty: 60 TABLET | Refills: 1 | Status: SHIPPED | OUTPATIENT
Start: 2025-02-21

## 2025-02-21 RX ORDER — LANCETS 30 GAUGE
EACH MISCELLANEOUS
Qty: 1 EACH | Refills: 0 | Status: SHIPPED | OUTPATIENT
Start: 2025-02-21

## 2025-02-21 RX ORDER — SEMAGLUTIDE 2.68 MG/ML
INJECTION, SOLUTION SUBCUTANEOUS
Qty: 3 ML | Refills: 1 | Status: SHIPPED | OUTPATIENT
Start: 2025-02-21

## 2025-02-21 RX ORDER — DOCUSATE SODIUM 100 MG/1
100 CAPSULE, LIQUID FILLED ORAL 2 TIMES DAILY
Qty: 60 CAPSULE | Refills: 1 | Status: SHIPPED | OUTPATIENT
Start: 2025-02-21

## 2025-03-06 ENCOUNTER — APPOINTMENT (OUTPATIENT)
Dept: ORTHOPEDIC SURGERY | Facility: CLINIC | Age: 55
End: 2025-03-06
Payer: MEDICARE

## 2025-03-17 DIAGNOSIS — N52.9 ERECTILE DYSFUNCTION, UNSPECIFIED ERECTILE DYSFUNCTION TYPE: Primary | ICD-10-CM

## 2025-03-17 RX ORDER — SILDENAFIL 100 MG/1
100 TABLET, FILM COATED ORAL DAILY PRN
Qty: 10 TABLET | Refills: 0 | Status: SHIPPED | OUTPATIENT
Start: 2025-03-17

## 2025-03-17 RX ORDER — SILDENAFIL 100 MG/1
100 TABLET, FILM COATED ORAL DAILY PRN
COMMUNITY
End: 2025-03-17 | Stop reason: SDUPTHER

## 2025-04-01 DIAGNOSIS — M54.50 CHRONIC MIDLINE LOW BACK PAIN WITHOUT SCIATICA: ICD-10-CM

## 2025-04-01 DIAGNOSIS — G89.29 CHRONIC MIDLINE LOW BACK PAIN WITHOUT SCIATICA: ICD-10-CM

## 2025-04-01 DIAGNOSIS — J43.2 CENTRILOBULAR EMPHYSEMA (MULTI): ICD-10-CM

## 2025-04-01 RX ORDER — FLUTICASONE FUROATE, UMECLIDINIUM BROMIDE AND VILANTEROL TRIFENATATE 100; 62.5; 25 UG/1; UG/1; UG/1
1 POWDER RESPIRATORY (INHALATION) DAILY
Qty: 60 EACH | Refills: 0 | Status: SHIPPED | OUTPATIENT
Start: 2025-04-01

## 2025-04-01 RX ORDER — MELOXICAM 15 MG/1
15 TABLET ORAL DAILY
Qty: 30 TABLET | Refills: 0 | Status: SHIPPED | OUTPATIENT
Start: 2025-04-01

## 2025-04-08 DIAGNOSIS — M54.12 CERVICAL RADICULOPATHY, CHRONIC: ICD-10-CM

## 2025-04-08 DIAGNOSIS — G62.9 NEUROPATHY: ICD-10-CM

## 2025-04-08 RX ORDER — PREGABALIN 200 MG/1
200 CAPSULE ORAL 3 TIMES DAILY
Qty: 90 CAPSULE | Refills: 0 | Status: SHIPPED | OUTPATIENT
Start: 2025-04-08

## 2025-04-08 NOTE — TELEPHONE ENCOUNTER
Medication: Pregablin  -CSA: 9/7/23   -UDS: 8/30/24 (ED)  -Last appt: 2/7/25  -Next appt date: 6/9/25

## 2025-04-14 DIAGNOSIS — N52.9 ERECTILE DYSFUNCTION, UNSPECIFIED ERECTILE DYSFUNCTION TYPE: ICD-10-CM

## 2025-04-15 RX ORDER — SILDENAFIL 100 MG/1
100 TABLET, FILM COATED ORAL DAILY PRN
Qty: 10 TABLET | Refills: 0 | Status: SHIPPED | OUTPATIENT
Start: 2025-04-15

## 2025-04-25 DIAGNOSIS — G62.9 NEUROPATHY: ICD-10-CM

## 2025-04-25 DIAGNOSIS — M54.12 CERVICAL RADICULOPATHY, CHRONIC: ICD-10-CM

## 2025-04-25 DIAGNOSIS — K59.00 CONSTIPATION, UNSPECIFIED CONSTIPATION TYPE: ICD-10-CM

## 2025-04-25 DIAGNOSIS — M54.50 CHRONIC MIDLINE LOW BACK PAIN WITHOUT SCIATICA: ICD-10-CM

## 2025-04-25 DIAGNOSIS — R60.0 PERIPHERAL EDEMA: ICD-10-CM

## 2025-04-25 DIAGNOSIS — G89.29 CHRONIC MIDLINE LOW BACK PAIN WITHOUT SCIATICA: ICD-10-CM

## 2025-04-25 DIAGNOSIS — K21.9 GASTROESOPHAGEAL REFLUX DISEASE, UNSPECIFIED WHETHER ESOPHAGITIS PRESENT: ICD-10-CM

## 2025-04-28 DIAGNOSIS — E11.9 TYPE 2 DIABETES MELLITUS WITHOUT COMPLICATION, WITHOUT LONG-TERM CURRENT USE OF INSULIN: ICD-10-CM

## 2025-04-28 DIAGNOSIS — E66.2 CLASS 2 OBESITY WITH ALVEOLAR HYPOVENTILATION WITHOUT SERIOUS COMORBIDITY WITH BODY MASS INDEX (BMI) OF 36.0 TO 36.9 IN ADULT: ICD-10-CM

## 2025-04-28 DIAGNOSIS — E66.812 CLASS 2 OBESITY WITH ALVEOLAR HYPOVENTILATION WITHOUT SERIOUS COMORBIDITY WITH BODY MASS INDEX (BMI) OF 36.0 TO 36.9 IN ADULT: ICD-10-CM

## 2025-04-28 RX ORDER — SUCRALFATE 1 G/1
1 TABLET ORAL
Qty: 60 TABLET | Refills: 1 | Status: SHIPPED | OUTPATIENT
Start: 2025-04-28

## 2025-04-28 RX ORDER — FUROSEMIDE 20 MG/1
20 TABLET ORAL DAILY
Qty: 30 TABLET | Refills: 1 | Status: SHIPPED | OUTPATIENT
Start: 2025-04-28

## 2025-04-28 RX ORDER — MELOXICAM 15 MG/1
15 TABLET ORAL DAILY
Qty: 30 TABLET | Refills: 0 | OUTPATIENT
Start: 2025-04-28

## 2025-04-28 RX ORDER — LANCETS 30 GAUGE
EACH MISCELLANEOUS
Qty: 1 EACH | Refills: 0 | Status: SHIPPED | OUTPATIENT
Start: 2025-04-28

## 2025-04-28 RX ORDER — SEMAGLUTIDE 2.68 MG/ML
INJECTION, SOLUTION SUBCUTANEOUS
Qty: 3 ML | Refills: 1 | Status: SHIPPED | OUTPATIENT
Start: 2025-04-28

## 2025-04-28 RX ORDER — DOCUSATE SODIUM 100 MG/1
100 CAPSULE, LIQUID FILLED ORAL 2 TIMES DAILY
Qty: 60 CAPSULE | Refills: 1 | Status: SHIPPED | OUTPATIENT
Start: 2025-04-28

## 2025-04-28 NOTE — TELEPHONE ENCOUNTER
It looks like he was getting the pregabalin from JOAQUIN Begum.. if he wants to get it from us can he stop in and do a CSA

## 2025-04-30 RX ORDER — PREGABALIN 200 MG/1
200 CAPSULE ORAL 3 TIMES DAILY
Qty: 90 CAPSULE | Refills: 0 | OUTPATIENT
Start: 2025-04-30

## 2025-05-02 DIAGNOSIS — G57.21 FEMORAL NEUROPATHY OF RIGHT LOWER EXTREMITY: ICD-10-CM

## 2025-05-02 RX ORDER — NORTRIPTYLINE HYDROCHLORIDE 50 MG/1
50 CAPSULE ORAL NIGHTLY
Qty: 30 CAPSULE | Refills: 1 | Status: SHIPPED | OUTPATIENT
Start: 2025-05-02

## 2025-05-05 ENCOUNTER — TELEPHONE (OUTPATIENT)
Dept: PHYSICAL MEDICINE AND REHAB | Facility: CLINIC | Age: 55
End: 2025-05-05
Payer: MEDICARE

## 2025-05-05 DIAGNOSIS — J43.2 CENTRILOBULAR EMPHYSEMA (MULTI): ICD-10-CM

## 2025-05-05 DIAGNOSIS — E78.2 MODERATE MIXED HYPERLIPIDEMIA NOT REQUIRING STATIN THERAPY: ICD-10-CM

## 2025-05-05 RX ORDER — FLUTICASONE FUROATE, UMECLIDINIUM BROMIDE AND VILANTEROL TRIFENATATE 100; 62.5; 25 UG/1; UG/1; UG/1
1 POWDER RESPIRATORY (INHALATION) DAILY
Qty: 60 EACH | Refills: 0 | Status: SHIPPED | OUTPATIENT
Start: 2025-05-05

## 2025-05-05 RX ORDER — ATORVASTATIN CALCIUM 40 MG/1
40 TABLET, FILM COATED ORAL DAILY
Qty: 30 TABLET | Refills: 2 | Status: SHIPPED | OUTPATIENT
Start: 2025-05-05

## 2025-05-05 NOTE — TELEPHONE ENCOUNTER
----- Message from Aleah Begum sent at 5/2/2025  1:39 PM EDT -----  I refilled his nortriptyline 1 time but I have not seen him in the most a year.  Thank you

## 2025-05-07 DIAGNOSIS — G89.29 CHRONIC MIDLINE LOW BACK PAIN WITHOUT SCIATICA: ICD-10-CM

## 2025-05-07 DIAGNOSIS — M54.50 CHRONIC MIDLINE LOW BACK PAIN WITHOUT SCIATICA: ICD-10-CM

## 2025-05-07 RX ORDER — MELOXICAM 15 MG/1
15 TABLET ORAL DAILY
Qty: 30 TABLET | Refills: 0 | Status: SHIPPED | OUTPATIENT
Start: 2025-05-07

## 2025-05-17 LAB
ALBUMIN SERPL-MCNC: 4.2 G/DL (ref 3.6–5.1)
ALP SERPL-CCNC: 129 U/L (ref 35–144)
ALT SERPL-CCNC: 28 U/L (ref 9–46)
ANION GAP SERPL CALCULATED.4IONS-SCNC: 10 MMOL/L (CALC) (ref 7–17)
AST SERPL-CCNC: 13 U/L (ref 10–35)
BASOPHILS # BLD AUTO: 89 CELLS/UL (ref 0–200)
BASOPHILS NFR BLD AUTO: 1.2 %
BILIRUB SERPL-MCNC: 0.3 MG/DL (ref 0.2–1.2)
BUN SERPL-MCNC: 17 MG/DL (ref 7–25)
CALCIUM SERPL-MCNC: 8.9 MG/DL (ref 8.6–10.3)
CHLORIDE SERPL-SCNC: 104 MMOL/L (ref 98–110)
CHOLEST SERPL-MCNC: 135 MG/DL
CHOLEST/HDLC SERPL: 3.5 (CALC)
CO2 SERPL-SCNC: 26 MMOL/L (ref 20–32)
CREAT SERPL-MCNC: 0.89 MG/DL (ref 0.7–1.3)
EGFRCR SERPLBLD CKD-EPI 2021: 102 ML/MIN/1.73M2
EOSINOPHIL # BLD AUTO: 348 CELLS/UL (ref 15–500)
EOSINOPHIL NFR BLD AUTO: 4.7 %
ERYTHROCYTE [DISTWIDTH] IN BLOOD BY AUTOMATED COUNT: 14.2 % (ref 11–15)
EST. AVERAGE GLUCOSE BLD GHB EST-MCNC: 103 MG/DL
EST. AVERAGE GLUCOSE BLD GHB EST-SCNC: 5.7 MMOL/L
GLUCOSE SERPL-MCNC: 89 MG/DL (ref 65–99)
HBA1C MFR BLD: 5.2 %
HCT VFR BLD AUTO: 47.6 % (ref 38.5–50)
HDLC SERPL-MCNC: 39 MG/DL
HGB BLD-MCNC: 15.6 G/DL (ref 13.2–17.1)
LDLC SERPL CALC-MCNC: 76 MG/DL (CALC)
LYMPHOCYTES # BLD AUTO: 2679 CELLS/UL (ref 850–3900)
LYMPHOCYTES NFR BLD AUTO: 36.2 %
MCH RBC QN AUTO: 32.4 PG (ref 27–33)
MCHC RBC AUTO-ENTMCNC: 32.8 G/DL (ref 32–36)
MCV RBC AUTO: 98.8 FL (ref 80–100)
MONOCYTES # BLD AUTO: 570 CELLS/UL (ref 200–950)
MONOCYTES NFR BLD AUTO: 7.7 %
NEUTROPHILS # BLD AUTO: 3715 CELLS/UL (ref 1500–7800)
NEUTROPHILS NFR BLD AUTO: 50.2 %
NONHDLC SERPL-MCNC: 96 MG/DL (CALC)
PLATELET # BLD AUTO: 308 THOUSAND/UL (ref 140–400)
PMV BLD REES-ECKER: 9.6 FL (ref 7.5–12.5)
POTASSIUM SERPL-SCNC: 3.7 MMOL/L (ref 3.5–5.3)
PROT SERPL-MCNC: 6.6 G/DL (ref 6.1–8.1)
RBC # BLD AUTO: 4.82 MILLION/UL (ref 4.2–5.8)
SODIUM SERPL-SCNC: 140 MMOL/L (ref 135–146)
TRIGL SERPL-MCNC: 118 MG/DL
TSH SERPL-ACNC: 0.55 MIU/L (ref 0.4–4.5)
WBC # BLD AUTO: 7.4 THOUSAND/UL (ref 3.8–10.8)

## 2025-05-20 DIAGNOSIS — N52.9 ERECTILE DYSFUNCTION, UNSPECIFIED ERECTILE DYSFUNCTION TYPE: ICD-10-CM

## 2025-05-20 RX ORDER — SILDENAFIL 100 MG/1
100 TABLET, FILM COATED ORAL DAILY PRN
Qty: 10 TABLET | Refills: 0 | Status: SHIPPED | OUTPATIENT
Start: 2025-05-20

## 2025-05-27 NOTE — PROGRESS NOTES
Subjective   Patient ID: Augie Tucker III is a 54 y.o. male who presents for Follow-up (4M), Shortness of Breath (Increased over 2 months), and Anxiety (Due to SOB).    HPI    Forearm pain: Has a visible knot in the dorsal forearm, states about a week ago he felt a popping sensation in his arm while lifting something heavy. No open areas, no redeness/drainage.     SOB: worse over the past 2 months. Had PFT done in 11/2023.  Normal. Happening daily. More with activity. He was on diazepam.  Feeling slight wheezing.  Still taking Singulair, trelegy.     Anxiety: R/t SOB. Was on diazepam in the past. Having worsening episodes with his breathing, hoping to be better once his breathing is improved.      Hip pain-Feels that they are grinding. Has jimenez in the past. Eventually went away. Was following with pain management in [place]. They left the area needs new referral. Asking to fill his lyrica this one time.      Hearing loss- left ear, loss of hearing. Feeling claustrophobic. Feels that there is a lot of pressure in his ear, denies fevers, drainage, or pain.  This started About 2 months ago.      Sinus issues: Had sinus surgery his headaches are better.  He is mass was biopsied     HTN: BP better today.  has been monitoring his bp at home, taking lasix for edema, no longer taking metoprolol due to episodes of dizziness with standing.       Weight gain: Currently on 2 mg Ozempic. His mom had bariatric surgery, he has a gym downstairs, doing bike and walking on treadmill.      Hep C: Taken medication for treatment Epclusa Follows with specialist     RLS: He is constantly shaking his legs, started about a month ago. It used to be a side effect of medications. States he has walked on the treadmill before to try an help with this.      Right shoulder and back causing him pain, going to talk to Dr. Begum about this. No specific injury, taking his ibuprofen as needed, Voltaren does not help.      Knee pain: States he has been  "having right knee pain, has tried knee sleeve before and this was not helping. Would like a knee brace to help.      BLE: He is having swelling, he was started on low dose Lasix to take prior to this follow up and they have not been helping. He is not able to wear his regular shoes, states he has not increased his urination. He is having low back pain also. Denies blood in urine, pain with urinating. Gets urine done daily, he is going to ask them to check urine culture next time they do it.      ED: He tried sildenafil and this did not help much. He was switched over tadalafil 10mg is working better for him and he was would like a refill.      Insomnia: States that zyprexa worked for him in the past for sleeping, he has to go to bed at 9pm but he has been up all night long. Increasing his hydroxazine to 50mg today.   He was given referral for sleep study before and never followed through     GERD: Waiting for appointment with GI. Dr. Woodruff October 25th. Needing prilosec for heartburn. He states he had one about 3 years ago. He is having issues with stomach burning, he is still losing weight. inconsistent, issues with diarrhea and constipation.     Shoulder pain: Out of Voltaren, interested in something else He was seeing Dr. Begum before for his back and neck and shoulder pains. Cant use his left shoulder The steroid helped before, cant sleep on it. This could be worsening his sleep. Lidocaine patches have helped in the past. Would like another round of medrol dose pack as this has also helped.      Neck Pain: He has a follow up with Neuro. He is seeing Aleah Avalos on Aug. 11th. He states its causing \"combustion in his head\", Causing blurred vision. Still feeling like someone is standing on his chest at times. He was told that he might have bone spurrs that could be causing issues in his neck. He was having left arm numbness. States that he was told he could see neurosurgery, needing referral. He was started on " "naproxen last time, Its not helping, has been taking Indocin from the hospital for a week. He is out of ibuprofen, only had it for 18 days 800mg tabs, this has helped in the past.   He is having pain in the back of his neck, states he feels popping and pain up the back of his neck. Also has hand numbness and tingling. Happening daily. States that he was able to bring his carpal tunnel braces for this. He has been given ibuprofen there for pain/headache and this is not helping much. States naproxen has helped in the past.      Going to see  With [facility]. Dr. Batista Stopped the sumatriptan and taking auto injection once per month. Needing to see new neurology. He now states that when he closes his eyes he is losing his balance.      Chest pains: States this has improved a lot, bp has improved also.      He had some TIA's before in the past, he was worried about this.     Review of systems completed and unremarkable other than what is documented in HPI.    Objective   /85 (BP Location: Right arm, Patient Position: Sitting, BP Cuff Size: Adult)   Pulse 85   Ht 1.778 m (5' 10\")   Wt 96.1 kg (211 lb 12.8 oz)   SpO2 99%   BMI 30.39 kg/m²     Patient Health Questionnaire-2 Score: 1 (6/9/2025  9:45 AM)    Physical Exam  Musculoskeletal:      Right forearm: Swelling and deformity present.       Gen: No acute distress, alert and oriented x3, pleasant   HEENT: moist mucous membranes, b/l external auditory canals are clear of debris, TMs within normal limits, no oropharyngeal lesions, eomi, perrla   Neck: thyroid within normal limits, no lymphadenopathy   CV: RRR, normal S1/S2, no murmur   Resp: Clear to auscultation bilaterally, no wheezes or rhonchi appreciated  Abd: soft, nontender, non-distended, no guarding/rigidity, bowel sounds present  Extr: no edema, no calf tenderness  Derm: Skin is warm and dry, no rashes appreciated  Psych: mood is good, affect is congruent, good hygiene, normal speech and eye " contact  Neuro: cranial nerves grossly intact, normal gait      Assessment/Plan       #Forearm swelling   Likely a torn tendon   Referral for ortho to be seen asap     #Anxiety   Discussed short course of diazepam for SOB     #SOB  Worsening with ambulation   Using trelegy and albuterol as needed   Trial airsupra for PRN use   If no improvement at follow up may need repeat PFT   Possible refer to Pulm     #DM2   Glucose monitor ordered- his is not working  A1C ordered for 3 months  Continue Ozempic 2 mg    #Cerumen impaction   Rx debrox drop   Can stop in for flushing if needed.      #Migraines  #Balance issues   Referral to neuro      #Chest pains  #Anxiety  Improved   Lexapro 20mg   Rx diazepam with SOB      #Insomnia   increased hydroxyzine   No longer seeing them   sleep study ordered   Dr. Brock   Increase duloxetine today      #GERD  #Abdominal pain  #Diarrhea  #Constipation  GI referral provided   refilled pantorprazole     #HCM  PSA last done 8/2024  Colonoscopy due, ordered

## 2025-06-02 DIAGNOSIS — J43.2 CENTRILOBULAR EMPHYSEMA (MULTI): ICD-10-CM

## 2025-06-02 RX ORDER — FLUTICASONE FUROATE, UMECLIDINIUM BROMIDE AND VILANTEROL TRIFENATATE 100; 62.5; 25 UG/1; UG/1; UG/1
1 POWDER RESPIRATORY (INHALATION) DAILY
Qty: 60 EACH | Refills: 0 | Status: SHIPPED | OUTPATIENT
Start: 2025-06-02

## 2025-06-09 ENCOUNTER — APPOINTMENT (OUTPATIENT)
Dept: PRIMARY CARE | Facility: CLINIC | Age: 55
End: 2025-06-09
Payer: MEDICARE

## 2025-06-09 VITALS
BODY MASS INDEX: 30.32 KG/M2 | SYSTOLIC BLOOD PRESSURE: 133 MMHG | OXYGEN SATURATION: 99 % | DIASTOLIC BLOOD PRESSURE: 85 MMHG | WEIGHT: 211.8 LBS | HEIGHT: 70 IN | HEART RATE: 85 BPM

## 2025-06-09 DIAGNOSIS — Z79.899 MEDICATION MANAGEMENT: ICD-10-CM

## 2025-06-09 DIAGNOSIS — M18.0 ARTHRITIS OF CARPOMETACARPAL (CMC) JOINT OF BOTH THUMBS: ICD-10-CM

## 2025-06-09 DIAGNOSIS — E11.9 TYPE 2 DIABETES MELLITUS WITHOUT COMPLICATION, WITHOUT LONG-TERM CURRENT USE OF INSULIN: ICD-10-CM

## 2025-06-09 DIAGNOSIS — M79.89 SWELLING OF FOREARM: ICD-10-CM

## 2025-06-09 DIAGNOSIS — F41.9 ANXIETY: Primary | ICD-10-CM

## 2025-06-09 DIAGNOSIS — Z12.11 COLON CANCER SCREENING: ICD-10-CM

## 2025-06-09 PROCEDURE — 3075F SYST BP GE 130 - 139MM HG: CPT | Performed by: REGISTERED NURSE

## 2025-06-09 PROCEDURE — 3008F BODY MASS INDEX DOCD: CPT | Performed by: REGISTERED NURSE

## 2025-06-09 PROCEDURE — 99214 OFFICE O/P EST MOD 30 MIN: CPT | Performed by: REGISTERED NURSE

## 2025-06-09 PROCEDURE — 3079F DIAST BP 80-89 MM HG: CPT | Performed by: REGISTERED NURSE

## 2025-06-09 RX ORDER — NALOXONE HYDROCHLORIDE 4 MG/.1ML
1 SPRAY NASAL AS NEEDED
Qty: 2 EACH | Refills: 0 | Status: SHIPPED | OUTPATIENT
Start: 2025-06-09

## 2025-06-09 RX ORDER — DICLOFENAC SODIUM 10 MG/G
4 GEL TOPICAL 4 TIMES DAILY
Qty: 100 G | Refills: 1 | Status: SHIPPED | OUTPATIENT
Start: 2025-06-09

## 2025-06-09 RX ORDER — DIAZEPAM 2 MG/1
2 TABLET ORAL 3 TIMES DAILY PRN
Qty: 30 TABLET | Refills: 0 | Status: SHIPPED | OUTPATIENT
Start: 2025-06-09 | End: 2025-06-19

## 2025-06-09 ASSESSMENT — PATIENT HEALTH QUESTIONNAIRE - PHQ9
SUM OF ALL RESPONSES TO PHQ9 QUESTIONS 1 AND 2: 1
1. LITTLE INTEREST OR PLEASURE IN DOING THINGS: SEVERAL DAYS
2. FEELING DOWN, DEPRESSED OR HOPELESS: NOT AT ALL

## 2025-06-09 NOTE — PATIENT INSTRUCTIONS
Lab Work:  Schedule an appointment for labs at [facility]/patient or call [phone] 24 hours a day, 7 days a week.   You can also download the [facility] lab scheduling anjali on your phone.     Radiology [phone]     Orthopedics:   Milad Hernandez () [phone]  Reinaldo Felder () [phone]  [facility] [phone]  [facility]: [phone]  Augie Julien () [phone]  Michael Centeno ([facility]) [phone]    Surgery:  Ignacio De La Garza ()  -Endoscopy ([place] and [place]) [phone]

## 2025-06-10 DIAGNOSIS — M54.50 CHRONIC MIDLINE LOW BACK PAIN WITHOUT SCIATICA: ICD-10-CM

## 2025-06-10 DIAGNOSIS — G89.29 CHRONIC MIDLINE LOW BACK PAIN WITHOUT SCIATICA: ICD-10-CM

## 2025-06-10 DIAGNOSIS — F41.9 ANXIETY: ICD-10-CM

## 2025-06-10 DIAGNOSIS — I10 PRIMARY HYPERTENSION: ICD-10-CM

## 2025-06-10 DIAGNOSIS — T78.40XA ALLERGY, INITIAL ENCOUNTER: ICD-10-CM

## 2025-06-10 RX ORDER — MELOXICAM 15 MG/1
15 TABLET ORAL DAILY
Qty: 90 TABLET | Refills: 2 | Status: SHIPPED | OUTPATIENT
Start: 2025-06-10

## 2025-06-10 RX ORDER — METOPROLOL SUCCINATE 25 MG/1
25 TABLET, EXTENDED RELEASE ORAL DAILY
Qty: 90 TABLET | Refills: 1 | Status: SHIPPED | OUTPATIENT
Start: 2025-06-10

## 2025-06-10 RX ORDER — DULOXETIN HYDROCHLORIDE 60 MG/1
60 CAPSULE, DELAYED RELEASE ORAL 2 TIMES DAILY
Qty: 180 CAPSULE | Refills: 2 | Status: SHIPPED | OUTPATIENT
Start: 2025-06-10

## 2025-06-10 RX ORDER — CETIRIZINE HYDROCHLORIDE 10 MG/1
10 TABLET ORAL DAILY
Qty: 90 TABLET | Refills: 1 | Status: SHIPPED | OUTPATIENT
Start: 2025-06-10

## 2025-06-30 DIAGNOSIS — N52.9 ERECTILE DYSFUNCTION, UNSPECIFIED ERECTILE DYSFUNCTION TYPE: ICD-10-CM

## 2025-06-30 RX ORDER — SILDENAFIL 100 MG/1
100 TABLET, FILM COATED ORAL DAILY PRN
Qty: 10 TABLET | Refills: 0 | Status: SHIPPED | OUTPATIENT
Start: 2025-06-30

## 2025-07-01 ENCOUNTER — ANESTHESIA EVENT (OUTPATIENT)
Dept: GASTROENTEROLOGY | Facility: HOSPITAL | Age: 55
End: 2025-07-01
Payer: MEDICARE

## 2025-07-01 PROBLEM — E66.812 CLASS 2 OBESITY WITH ALVEOLAR HYPOVENTILATION AND BODY MASS INDEX (BMI) OF 36.0 TO 36.9 IN ADULT: Status: RESOLVED | Noted: 2023-02-03 | Resolved: 2025-07-01

## 2025-07-01 PROBLEM — E66.2 CLASS 2 OBESITY WITH ALVEOLAR HYPOVENTILATION AND BODY MASS INDEX (BMI) OF 36.0 TO 36.9 IN ADULT: Status: RESOLVED | Noted: 2023-02-03 | Resolved: 2025-07-01

## 2025-07-01 PROBLEM — E66.811 CLASS 1 OBESITY IN ADULT: Status: ACTIVE | Noted: 2025-07-01

## 2025-07-01 SDOH — HEALTH STABILITY: MENTAL HEALTH: CURRENT SMOKER: 1

## 2025-07-01 NOTE — ANESTHESIA PREPROCEDURE EVALUATION
Patient: Augie Tucker III    Procedure Information       Date/Time: 07/17/25 1235    Scheduled providers: Ignacio De La Garza MD    Procedure: COLONOSCOPY    Location: [facility]            Relevant Problems   Anesthesia (within normal limits)      Cardiac   (+) HTN (hypertension)   (+) Hyperlipidemia      Pulmonary   (+) Chronic obstructive pulmonary disease (Multi)      Neuro   (+) Anxiety   (+) Bipolar I disorder (Multi)   (+) Cervical neuropathy   (+) Chronic lumbar radiculopathy   (+) Complex regional pain syndrome type 2 of left upper extremity   (+) Depression   (+) Hx of TIA (transient ischemic attack) and stroke   (+) Major depressive disorder, recurrent severe without psychotic features (Multi)   (+) Migraine   (+) PTSD (post-traumatic stress disorder)      GI   (+) GERD (gastroesophageal reflux disease)      /Renal (within normal limits)      Liver   (+) Elevated liver function tests   (+) Hepatitis C, chronic (Multi)      Endocrine   (+) Class 1 obesity in adult   (+) Diabetes mellitus type 2, uncomplicated      Hematology (within normal limits)      Musculoskeletal   (+) Complex regional pain syndrome type 2 of left upper extremity   (+) Fibromyalgia      HEENT (within normal limits)      ID   (+) Hepatitis C, chronic (Multi)      Skin (within normal limits)      GYN (within normal limits)      Respiratory   (+) Obstructive sleep apnea      Symptoms and Signs   (+) Syncope and collapse      Tobacco   (+) Tobacco use disorder      Mental Health   (+) Cannabis-related disorder (Multi)       Clinical information reviewed:                   NPO Detail:  No data recorded     Physical Exam    Airway  Mallampati: III  TM distance: >3 FB  Neck ROM: full  Mouth opening: 3 or more finger widths     Cardiovascular - normal exam   Dental     (+) upper dentures     Pulmonary - normal exam   Abdominal - normal exam           Anesthesia Plan    History of general anesthesia?: yes  History of complications of  general anesthesia?: yes    ASA 3     MAC     The patient is a current smoker.  Patient was previously instructed to abstain from smoking on day of procedure.  Patient did not smoke on day of procedure.    intravenous induction   Anesthetic plan and risks discussed with patient.  Use of blood products discussed with patient who consented to blood products.    Plan discussed with attending.

## 2025-07-02 ENCOUNTER — PRE-ADMISSION TESTING (OUTPATIENT)
Dept: PREADMISSION TESTING | Facility: HOSPITAL | Age: 55
End: 2025-07-02
Payer: MEDICARE

## 2025-07-03 ENCOUNTER — PRE-ADMISSION TESTING (OUTPATIENT)
Dept: PREADMISSION TESTING | Facility: HOSPITAL | Age: 55
End: 2025-07-03
Payer: MEDICARE

## 2025-07-03 ASSESSMENT — DUKE ACTIVITY SCORE INDEX (DASI)
DASI METS SCORE: 8
TOTAL_SCORE: 42.7
CAN YOU RUN A SHORT DISTANCE: NO
CAN YOU HAVE SEXUAL RELATIONS: YES
CAN YOU PARTICIPATE IN STRENOUS SPORTS LIKE SWIMMING, SINGLES TENNIS, FOOTBALL, BASKETBALL, OR SKIING: NO
CAN YOU WALK INDOORS, SUCH AS AROUND YOUR HOUSE: YES
CAN YOU DO YARD WORK LIKE RAKING LEAVES, WEEDING OR PUSHING A MOWER: YES
CAN YOU CLIMB A FLIGHT OF STAIRS OR WALK UP A HILL: YES
CAN YOU PARTICIPATE IN MODERATE RECREATIONAL ACTIVITIES LIKE GOLF, BOWLING, DANCING, DOUBLES TENNIS OR THROWING A BASEBALL OR FOOTBALL: YES
CAN YOU WALK A BLOCK OR TWO ON LEVEL GROUND: YES
CAN YOU DO LIGHT WORK AROUND THE HOUSE LIKE DUSTING OR WASHING DISHES: YES
CAN YOU TAKE CARE OF YOURSELF (EAT, DRESS, BATHE, OR USE TOILET): YES
CAN YOU DO MODERATE WORK AROUND THE HOUSE LIKE VACUUMING, SWEEPING FLOORS OR CARRYING GROCERIES: YES
CAN YOU DO HEAVY WORK AROUND THE HOUSE LIKE SCRUBBING FLOORS OR LIFTING AND MOVING HEAVY FURNITURE: YES

## 2025-07-14 DIAGNOSIS — J43.2 CENTRILOBULAR EMPHYSEMA (MULTI): ICD-10-CM

## 2025-07-14 RX ORDER — FLUTICASONE FUROATE, UMECLIDINIUM BROMIDE AND VILANTEROL TRIFENATATE 100; 62.5; 25 UG/1; UG/1; UG/1
1 POWDER RESPIRATORY (INHALATION) DAILY
Qty: 60 EACH | Refills: 0 | Status: SHIPPED | OUTPATIENT
Start: 2025-07-14

## 2025-07-15 DIAGNOSIS — K21.9 GASTROESOPHAGEAL REFLUX DISEASE, UNSPECIFIED WHETHER ESOPHAGITIS PRESENT: ICD-10-CM

## 2025-07-15 DIAGNOSIS — R60.0 PERIPHERAL EDEMA: ICD-10-CM

## 2025-07-15 DIAGNOSIS — K59.00 CONSTIPATION, UNSPECIFIED CONSTIPATION TYPE: ICD-10-CM

## 2025-07-15 DIAGNOSIS — G62.9 NEUROPATHY: ICD-10-CM

## 2025-07-15 DIAGNOSIS — M54.12 CERVICAL RADICULOPATHY, CHRONIC: ICD-10-CM

## 2025-07-16 RX ORDER — FUROSEMIDE 20 MG/1
20 TABLET ORAL DAILY
Qty: 90 TABLET | Refills: 1 | Status: SHIPPED | OUTPATIENT
Start: 2025-07-16

## 2025-07-16 RX ORDER — SUCRALFATE 1 G/1
1 TABLET ORAL
Qty: 56 TABLET | Refills: 1 | Status: SHIPPED | OUTPATIENT
Start: 2025-07-16

## 2025-07-16 RX ORDER — DOCUSATE SODIUM 100 MG/1
100 CAPSULE, LIQUID FILLED ORAL 2 TIMES DAILY
Qty: 180 CAPSULE | Refills: 1 | Status: SHIPPED | OUTPATIENT
Start: 2025-07-16

## 2025-07-16 RX ORDER — PREGABALIN 200 MG/1
200 CAPSULE ORAL 3 TIMES DAILY
Qty: 90 CAPSULE | Refills: 1 | Status: SHIPPED | OUTPATIENT
Start: 2025-07-16

## 2025-07-17 ENCOUNTER — HOSPITAL ENCOUNTER (OUTPATIENT)
Dept: GASTROENTEROLOGY | Facility: HOSPITAL | Age: 55
Discharge: HOME | End: 2025-07-17
Payer: MEDICARE

## 2025-07-17 ENCOUNTER — ANESTHESIA (OUTPATIENT)
Dept: GASTROENTEROLOGY | Facility: HOSPITAL | Age: 55
End: 2025-07-17
Payer: MEDICARE

## 2025-07-17 VITALS
DIASTOLIC BLOOD PRESSURE: 60 MMHG | SYSTOLIC BLOOD PRESSURE: 101 MMHG | RESPIRATION RATE: 17 BRPM | TEMPERATURE: 98.1 F | OXYGEN SATURATION: 95 % | WEIGHT: 211 LBS | HEIGHT: 70 IN | BODY MASS INDEX: 30.21 KG/M2 | HEART RATE: 87 BPM

## 2025-07-17 DIAGNOSIS — Z12.11 COLON CANCER SCREENING: ICD-10-CM

## 2025-07-17 LAB — GLUCOSE BLD MANUAL STRIP-MCNC: 99 MG/DL (ref 74–99)

## 2025-07-17 PROCEDURE — 82947 ASSAY GLUCOSE BLOOD QUANT: CPT

## 2025-07-17 PROCEDURE — 2500000004 HC RX 250 GENERAL PHARMACY W/ HCPCS (ALT 636 FOR OP/ED): Performed by: NURSE ANESTHETIST, CERTIFIED REGISTERED

## 2025-07-17 PROCEDURE — 3700000001 HC GENERAL ANESTHESIA TIME - INITIAL BASE CHARGE

## 2025-07-17 PROCEDURE — G0105 COLORECTAL SCRN; HI RISK IND: HCPCS | Performed by: SURGERY

## 2025-07-17 PROCEDURE — 7100000009 HC PHASE TWO TIME - INITIAL BASE CHARGE

## 2025-07-17 PROCEDURE — 3700000002 HC GENERAL ANESTHESIA TIME - EACH INCREMENTAL 1 MINUTE

## 2025-07-17 PROCEDURE — 7100000010 HC PHASE TWO TIME - EACH INCREMENTAL 1 MINUTE

## 2025-07-17 PROCEDURE — 45378 DIAGNOSTIC COLONOSCOPY: CPT | Performed by: SURGERY

## 2025-07-17 RX ORDER — PROPOFOL 10 MG/ML
INJECTION, EMULSION INTRAVENOUS AS NEEDED
Status: DISCONTINUED | OUTPATIENT
Start: 2025-07-17 | End: 2025-07-17

## 2025-07-17 RX ORDER — MIDAZOLAM HYDROCHLORIDE 1 MG/ML
INJECTION, SOLUTION INTRAMUSCULAR; INTRAVENOUS AS NEEDED
Status: DISCONTINUED | OUTPATIENT
Start: 2025-07-17 | End: 2025-07-17

## 2025-07-17 RX ORDER — POLYETHYLENE GLYCOL 3350 17 G/17G
17 POWDER, FOR SOLUTION ORAL DAILY
Qty: 510 G | Refills: 0 | OUTPATIENT
Start: 2025-07-17 | End: 2025-08-16

## 2025-07-17 RX ADMIN — PROPOFOL 50 MG: 10 INJECTION, EMULSION INTRAVENOUS at 07:59

## 2025-07-17 RX ADMIN — PROPOFOL 50 MG: 10 INJECTION, EMULSION INTRAVENOUS at 07:57

## 2025-07-17 RX ADMIN — PROPOFOL 50 MG: 10 INJECTION, EMULSION INTRAVENOUS at 07:55

## 2025-07-17 RX ADMIN — PROPOFOL 50 MG: 10 INJECTION, EMULSION INTRAVENOUS at 08:07

## 2025-07-17 RX ADMIN — MIDAZOLAM 2 MG: 1 INJECTION INTRAMUSCULAR; INTRAVENOUS at 07:55

## 2025-07-17 RX ADMIN — PROPOFOL 50 MG: 10 INJECTION, EMULSION INTRAVENOUS at 08:09

## 2025-07-17 RX ADMIN — PROPOFOL 50 MG: 10 INJECTION, EMULSION INTRAVENOUS at 07:56

## 2025-07-17 RX ADMIN — PROPOFOL 50 MG: 10 INJECTION, EMULSION INTRAVENOUS at 08:03

## 2025-07-17 RX ADMIN — PROPOFOL 50 MG: 10 INJECTION, EMULSION INTRAVENOUS at 08:01

## 2025-07-17 RX ADMIN — PROPOFOL 50 MG: 10 INJECTION, EMULSION INTRAVENOUS at 08:05

## 2025-07-17 RX ADMIN — PROPOFOL 50 MG: 10 INJECTION, EMULSION INTRAVENOUS at 08:11

## 2025-07-17 ASSESSMENT — PAIN SCALES - GENERAL
PAINLEVEL_OUTOF10: 0 - NO PAIN
PAIN_LEVEL: 0

## 2025-07-17 ASSESSMENT — ENCOUNTER SYMPTOMS
BLOOD IN STOOL: 0
NEUROLOGICAL NEGATIVE: 1
CHILLS: 0
CARDIOVASCULAR NEGATIVE: 1
CONSTIPATION: 1
NAUSEA: 0
DIARRHEA: 0
ABDOMINAL DISTENTION: 0
VOMITING: 0
DIAPHORESIS: 0
PSYCHIATRIC NEGATIVE: 1
WHEEZING: 0
FEVER: 0
PALPITATIONS: 0
WOUND: 0
ABDOMINAL PAIN: 0
FATIGUE: 0
MUSCULOSKELETAL NEGATIVE: 1
SHORTNESS OF BREATH: 0

## 2025-07-17 ASSESSMENT — PAIN - FUNCTIONAL ASSESSMENT
PAIN_FUNCTIONAL_ASSESSMENT: 0-10

## 2025-07-17 NOTE — DISCHARGE INSTRUCTIONS
Sent Miralax to pharmacy for constipation to take daily. If the Miralax gives you diarrhea can switch to taking it every other day.        Patient Instructions after a Colonoscopy      The anesthetics, sedatives or narcotics which were given to you today will be acting in your body for the next 24 hours, so you might feel a little sleepy or groggy.  This feeling should slowly wear off. Carefully read and follow the instructions.     You received sedation today:  - Do not drive or operate any machinery or power tools of any kind.   - No alcoholic beverages today, not even beer or wine.  - Do not make any important decisions or sign any legal documents.  - No over the counter medications that contain alcohol or that may cause drowsiness.  - Do not make any important decisions or sign any legal documents.  - Make sure you have someone with you for first 24 hours.    While it is common to experience mild to moderate abdominal distention, gas, or belching after your procedure, if any of these symptoms occur following discharge from the GI Lab or within one week of having your procedure, call the [facility] to be advised whether a visit to your nearest Urgent Care or Emergency Department is indicated.  Take this paper with you if you go.     - If you develop an allergic reaction to the medications that were given during your procedure such as difficulty breathing, rash, hives, severe nausea, vomiting or lightheadedness.  - If you experience chest pain, shortness of breath, severe abdominal pain, fevers and chills.  -If you develop signs and symptoms of bleeding such as blood in your spit, if your stools turn black, tarry, or bloody  - If you have not urinated within 8 hours following your procedure.  - If your IV site becomes painful, red, inflamed, or looks infected.    If you received a biopsy/polypectomy/sphincterotomy the following instructions apply below:    __ Do not use Aspirin containing products,  non-steroidal medications or anti-coagulants for one week following your procedure. (Examples of these types of medications are: Advil, Arthrotec, Aleve, Coumadin, Ecotrin, Heparin, Ibuprofen, Indocin, Motrin, Naprosyn, Nuprin, Plavix, Vioxx, and Voltarin, or their generic forms.  This list is not all-inclusive.  Check with your physician or pharmacist before resuming medications.)   __ Eat a soft diet today.  Avoid foods that are poorly digested for the next 24 hours.  These foods would include: nuts, beans, lettuce, red meats, and fried foods. Start with liquids and advance your diet as tolerated, gradually work up to eating solids.   __ Do not have a Barium Study or Enema for one week.    Your physician recommends the additional following instructions:    -You have a contact number available for emergencies. The signs and symptoms of potential delayed complications were discussed with you. You may return to normal activities tomorrow.  -Resume your previous diet.  -Continue your present medications.   -We are waiting for your pathology results.  -Your physician has recommended a repeat colonoscopy (date to be determined after pending pathology results are reviewed) for surveillance based on pathology results.  -The findings and recommendations have been discussed with you.  -The findings and recommendations were discussed with your family.  - Please see Medication Reconciliation Form for new medication/medications prescribed.       If you experience any problems or have any questions following discharge from the GI Lab, please call:        Nurse Signature                                                                        Date___________________                                                                            Patient/Responsible Party Signature                                        Date___________________
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
## 2025-07-17 NOTE — ANESTHESIA POSTPROCEDURE EVALUATION
Patient: Augie Tucker III    Procedure Summary       Date: 07/17/25 Room / Location: [facility]    Anesthesia Start: 0752 Anesthesia Stop: 0818    Procedure: COLONOSCOPY Diagnosis: Colon cancer screening    Scheduled Providers: Ignacio De La Garza MD Responsible Provider: NICOLE Falcon    Anesthesia Type: MAC ASA Status: 3            Anesthesia Type: MAC    Vitals Value Taken Time   /60 07/17/25 08:47   Temp 36.7 °C (98.1 °F) 07/17/25 08:17   Pulse 87 07/17/25 08:47   Resp 17 07/17/25 08:47   SpO2 95 % 07/17/25 08:47       Anesthesia Post Evaluation    Patient location during evaluation: bedside  Patient participation: complete - patient participated  Level of consciousness: awake and alert  Pain score: 0  Pain management: adequate  Airway patency: patent  Cardiovascular status: acceptable  Respiratory status: acceptable  Hydration status: acceptable  Postoperative Nausea and Vomiting: none        There were no known notable events for this encounter.

## 2025-07-22 DIAGNOSIS — E66.812 CLASS 2 OBESITY WITH ALVEOLAR HYPOVENTILATION WITHOUT SERIOUS COMORBIDITY WITH BODY MASS INDEX (BMI) OF 36.0 TO 36.9 IN ADULT: ICD-10-CM

## 2025-07-22 DIAGNOSIS — E66.2 CLASS 2 OBESITY WITH ALVEOLAR HYPOVENTILATION WITHOUT SERIOUS COMORBIDITY WITH BODY MASS INDEX (BMI) OF 36.0 TO 36.9 IN ADULT: ICD-10-CM

## 2025-07-22 DIAGNOSIS — E11.9 TYPE 2 DIABETES MELLITUS WITHOUT COMPLICATION, WITHOUT LONG-TERM CURRENT USE OF INSULIN: ICD-10-CM

## 2025-07-22 RX ORDER — SEMAGLUTIDE 2.68 MG/ML
INJECTION, SOLUTION SUBCUTANEOUS
Qty: 3 ML | Refills: 1 | Status: SHIPPED | OUTPATIENT
Start: 2025-07-22

## 2025-08-05 DIAGNOSIS — J43.2 CENTRILOBULAR EMPHYSEMA (MULTI): ICD-10-CM

## 2025-08-05 RX ORDER — MONTELUKAST SODIUM 10 MG/1
10 TABLET ORAL NIGHTLY
Qty: 90 TABLET | Refills: 11 | Status: SHIPPED | OUTPATIENT
Start: 2025-08-05

## 2025-08-11 ENCOUNTER — APPOINTMENT (OUTPATIENT)
Dept: PRIMARY CARE | Facility: CLINIC | Age: 55
End: 2025-08-11
Payer: MEDICARE

## 2025-08-11 DIAGNOSIS — M54.50 CHRONIC MIDLINE LOW BACK PAIN WITHOUT SCIATICA: Primary | ICD-10-CM

## 2025-08-11 DIAGNOSIS — Z79.899 MEDICATION MANAGEMENT: ICD-10-CM

## 2025-08-11 DIAGNOSIS — G89.29 CHRONIC MIDLINE LOW BACK PAIN WITHOUT SCIATICA: Primary | ICD-10-CM

## 2025-08-11 DIAGNOSIS — J43.2 CENTRILOBULAR EMPHYSEMA (MULTI): ICD-10-CM

## 2025-08-11 PROCEDURE — 99214 OFFICE O/P EST MOD 30 MIN: CPT | Performed by: REGISTERED NURSE

## 2025-08-11 PROCEDURE — G2211 COMPLEX E/M VISIT ADD ON: HCPCS | Performed by: REGISTERED NURSE

## 2025-08-11 RX ORDER — FLUTICASONE FUROATE, UMECLIDINIUM BROMIDE AND VILANTEROL TRIFENATATE 100; 62.5; 25 UG/1; UG/1; UG/1
1 POWDER RESPIRATORY (INHALATION) DAILY
Qty: 60 EACH | Refills: 0 | Status: SHIPPED | OUTPATIENT
Start: 2025-08-11

## 2025-08-14 DIAGNOSIS — N52.9 ERECTILE DYSFUNCTION, UNSPECIFIED ERECTILE DYSFUNCTION TYPE: ICD-10-CM

## 2025-08-15 RX ORDER — SILDENAFIL 100 MG/1
100 TABLET, FILM COATED ORAL DAILY PRN
Qty: 10 TABLET | Refills: 0 | Status: SHIPPED | OUTPATIENT
Start: 2025-08-15

## 2025-08-26 DIAGNOSIS — N52.9 ERECTILE DYSFUNCTION, UNSPECIFIED ERECTILE DYSFUNCTION TYPE: ICD-10-CM

## 2025-08-26 RX ORDER — SILDENAFIL 100 MG/1
100 TABLET, FILM COATED ORAL DAILY PRN
Qty: 10 TABLET | Refills: 0 | Status: SHIPPED | OUTPATIENT
Start: 2025-08-26

## 2025-09-02 DIAGNOSIS — K21.9 GASTROESOPHAGEAL REFLUX DISEASE, UNSPECIFIED WHETHER ESOPHAGITIS PRESENT: ICD-10-CM

## 2025-09-02 RX ORDER — SUCRALFATE 1 G/1
1 TABLET ORAL
Qty: 56 TABLET | Refills: 1 | Status: SHIPPED | OUTPATIENT
Start: 2025-09-02

## 2025-09-09 ENCOUNTER — APPOINTMENT (OUTPATIENT)
Dept: ORTHOPEDIC SURGERY | Facility: CLINIC | Age: 55
End: 2025-09-09
Payer: MEDICARE

## 2025-11-17 ENCOUNTER — APPOINTMENT (OUTPATIENT)
Dept: PRIMARY CARE | Facility: CLINIC | Age: 55
End: 2025-11-17
Payer: MEDICARE

## (undated) DEVICE — PAD, GROUNDING, ELECTROSURGICAL, W/9 FT CABLE, POLYHESIVE II, ADULT, LF

## (undated) DEVICE — DRILL BIT, 1.2MM, CROSS-CUT FISSURE

## (undated) DEVICE — MANIFOLD, 4 PORT NEPTUNE STANDARD

## (undated) DEVICE — CATHETER, URETHRAL, PEZZER, 3 CM HEAD, 36 FR, LATEX

## (undated) DEVICE — SUTURE, VICRYL, 4-0, 18 IN, UNDYED BR PS-2

## (undated) DEVICE — BOWL, BASIN, 32 OZ, STERILE

## (undated) DEVICE — REST, HEAD, BAGEL, 9 IN

## (undated) DEVICE — GOWN, ASTOUND, XL

## (undated) DEVICE — Device

## (undated) DEVICE — DRILL BIT, 1.6MM, CROSS-CUT FISSURE

## (undated) DEVICE — WOUND SYSTEM, DEBRIDEMENT & CLEANING, O.R DUOPAK

## (undated) DEVICE — TIP, SUCTION, FRAZIER, W/CONTROL VENT, 10 FR

## (undated) DEVICE — TUBE, SALEM SUMP, 16 FR X 48IN, ENFIT

## (undated) DEVICE — GLOVE, SURGICAL, PROTEXIS PI ORTHO, 7.5, PF, LF

## (undated) DEVICE — SPONGE, HEMOSTATIC, GELATIN, SURGIFOAM, 8 X 12.5 CM X 10 MM

## (undated) DEVICE — COVER, CART, 45 X 27 X 48 IN, CLEAR